# Patient Record
Sex: MALE | Race: WHITE | NOT HISPANIC OR LATINO | Employment: OTHER | URBAN - METROPOLITAN AREA
[De-identification: names, ages, dates, MRNs, and addresses within clinical notes are randomized per-mention and may not be internally consistent; named-entity substitution may affect disease eponyms.]

---

## 2017-01-10 ENCOUNTER — ALLSCRIPTS OFFICE VISIT (OUTPATIENT)
Dept: OTHER | Facility: OTHER | Age: 82
End: 2017-01-10

## 2017-01-17 ENCOUNTER — APPOINTMENT (INPATIENT)
Dept: CT IMAGING | Facility: HOSPITAL | Age: 82
DRG: 871 | End: 2017-01-17
Payer: MEDICARE

## 2017-01-17 ENCOUNTER — APPOINTMENT (EMERGENCY)
Dept: RADIOLOGY | Facility: HOSPITAL | Age: 82
DRG: 871 | End: 2017-01-17
Payer: MEDICARE

## 2017-01-17 ENCOUNTER — APPOINTMENT (INPATIENT)
Dept: RADIOLOGY | Facility: HOSPITAL | Age: 82
DRG: 871 | End: 2017-01-17
Payer: MEDICARE

## 2017-01-17 ENCOUNTER — APPOINTMENT (EMERGENCY)
Dept: CT IMAGING | Facility: HOSPITAL | Age: 82
DRG: 871 | End: 2017-01-17
Payer: MEDICARE

## 2017-01-17 ENCOUNTER — HOSPITAL ENCOUNTER (INPATIENT)
Facility: HOSPITAL | Age: 82
LOS: 17 days | Discharge: NURSING, REHAB OR LONG-TERM CARE | DRG: 871 | End: 2017-02-03
Attending: EMERGENCY MEDICINE | Admitting: INTERNAL MEDICINE
Payer: MEDICARE

## 2017-01-17 DIAGNOSIS — R41.89 COGNITIVE DECLINE: ICD-10-CM

## 2017-01-17 DIAGNOSIS — R65.20 SEVERE SEPSIS (HCC): Primary | ICD-10-CM

## 2017-01-17 DIAGNOSIS — I34.0 MITRAL REGURGITATION: ICD-10-CM

## 2017-01-17 DIAGNOSIS — N39.0 URINARY TRACT INFECTION WITHOUT HEMATURIA, SITE UNSPECIFIED: ICD-10-CM

## 2017-01-17 DIAGNOSIS — F03.91 DEMENTIA WITH BEHAVIORAL DISTURBANCE (HCC): ICD-10-CM

## 2017-01-17 DIAGNOSIS — R77.8 ELEVATED TROPONIN: ICD-10-CM

## 2017-01-17 DIAGNOSIS — R33.9 URINARY RETENTION: ICD-10-CM

## 2017-01-17 DIAGNOSIS — A41.9 SEVERE SEPSIS (HCC): Primary | ICD-10-CM

## 2017-01-17 DIAGNOSIS — I48.91 RAPID ATRIAL FIBRILLATION (HCC): ICD-10-CM
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
## 2017-01-17 PROBLEM — R74.01 TRANSAMINITIS: Status: ACTIVE | Noted: 2017-01-17

## 2017-01-17 PROBLEM — N17.9 AKI (ACUTE KIDNEY INJURY) (HCC): Status: ACTIVE | Noted: 2017-01-17

## 2017-01-17 PROBLEM — M62.82 RHABDOMYOLYSIS: Status: ACTIVE | Noted: 2017-01-17

## 2017-01-17 LAB
ALBUMIN SERPL BCP-MCNC: 3.4 G/DL (ref 3.5–5)
ALP SERPL-CCNC: 69 U/L (ref 46–116)
ALT SERPL W P-5'-P-CCNC: 142 U/L (ref 12–78)
AMMONIA PLAS-SCNC: <10 UMOL/L (ref 11–35)
AMPHETAMINES SERPL QL SCN: NEGATIVE
ANION GAP SERPL CALCULATED.3IONS-SCNC: 9 MMOL/L (ref 4–13)
APTT PPP: 28 SECONDS (ref 24–36)
ARTERIAL PATENCY WRIST A: ABNORMAL
AST SERPL W P-5'-P-CCNC: 274 U/L (ref 5–45)
ATRIAL RATE: 90 BPM
BACTERIA UR QL AUTO: ABNORMAL /HPF
BARBITURATES UR QL: NEGATIVE
BASE EXCESS BLDA CALC-SCNC: 1 MMOL/L (ref -2–3)
BASOPHILS # BLD AUTO: 0.01 THOUSANDS/ΜL (ref 0–0.1)
BASOPHILS NFR BLD AUTO: 0 % (ref 0–1)
BENZODIAZ UR QL: NEGATIVE
BILIRUB DIRECT SERPL-MCNC: 0.39 MG/DL (ref 0–0.2)
BILIRUB SERPL-MCNC: 1.3 MG/DL (ref 0.2–1)
BILIRUB UR QL STRIP: NEGATIVE
BUN SERPL-MCNC: 62 MG/DL (ref 5–25)
CA-I BLD-SCNC: 1.11 MMOL/L (ref 1.12–1.32)
CALCIUM SERPL-MCNC: 8.8 MG/DL (ref 8.3–10.1)
CHLORIDE SERPL-SCNC: 101 MMOL/L (ref 100–108)
CK MB SERPL-MCNC: 16.2 NG/ML (ref 0–5)
CK MB SERPL-MCNC: <1 % (ref 0–2.5)
CK SERPL-CCNC: 6738 U/L (ref 39–308)
CLARITY UR: ABNORMAL
CO2 SERPL-SCNC: 31 MMOL/L (ref 21–32)
COCAINE UR QL: NEGATIVE
COLOR UR: YELLOW
CREAT SERPL-MCNC: 1.37 MG/DL (ref 0.6–1.3)
EOSINOPHIL # BLD AUTO: 0.02 THOUSAND/ΜL (ref 0–0.61)
EOSINOPHIL NFR BLD AUTO: 0 % (ref 0–6)
ERYTHROCYTE [DISTWIDTH] IN BLOOD BY AUTOMATED COUNT: 13.3 % (ref 11.6–15.1)
FIO2 GAS DIL.REBREATH: 21 L
FOLATE SERPL-MCNC: 19 NG/ML (ref 3.1–17.5)
GFR SERPL CREATININE-BSD FRML MDRD: 49.7 ML/MIN/1.73SQ M
GLUCOSE SERPL-MCNC: 119 MG/DL (ref 65–140)
GLUCOSE SERPL-MCNC: 123 MG/DL (ref 65–140)
GLUCOSE UR STRIP-MCNC: NEGATIVE MG/DL
HCO3 BLDA-SCNC: 24.9 MMOL/L (ref 22–28)
HCT VFR BLD AUTO: 38.1 % (ref 36.5–49.3)
HCT VFR BLD CALC: 30 % (ref 36.5–49.3)
HGB BLD-MCNC: 13.3 G/DL (ref 12–17)
HGB BLDA-MCNC: 10.2 G/DL (ref 12–17)
HGB UR QL STRIP.AUTO: ABNORMAL
INR PPP: 1.27 (ref 0.86–1.16)
KETONES UR STRIP-MCNC: NEGATIVE MG/DL
LACTATE SERPL-SCNC: 1.9 MMOL/L (ref 0.5–2)
LACTATE SERPL-SCNC: 2.4 MMOL/L (ref 0.5–2)
LEUKOCYTE ESTERASE UR QL STRIP: ABNORMAL
LIPASE SERPL-CCNC: 161 U/L (ref 73–393)
LYMPHOCYTES # BLD AUTO: 0.75 THOUSANDS/ΜL (ref 0.6–4.47)
LYMPHOCYTES NFR BLD AUTO: 5 % (ref 14–44)
MCH RBC QN AUTO: 33.3 PG (ref 26.8–34.3)
MCHC RBC AUTO-ENTMCNC: 34.9 G/DL (ref 31.4–37.4)
MCV RBC AUTO: 95 FL (ref 82–98)
METHADONE UR QL: NEGATIVE
MONOCYTES # BLD AUTO: 1.14 THOUSAND/ΜL (ref 0.17–1.22)
MONOCYTES NFR BLD AUTO: 7 % (ref 4–12)
NEUTROPHILS # BLD AUTO: 13.75 THOUSANDS/ΜL (ref 1.85–7.62)
NEUTS SEG NFR BLD AUTO: 88 % (ref 43–75)
NITRITE UR QL STRIP: NEGATIVE
NON-SQ EPI CELLS URNS QL MICRO: ABNORMAL /HPF
OPIATES UR QL SCN: NEGATIVE
P AXIS: 92 DEGREES
PCO2 BLD: 26 MMOL/L (ref 21–32)
PCO2 BLD: 36.8 MM HG (ref 36–44)
PCP UR QL: NEGATIVE
PH BLD: 7.44 [PH] (ref 7.35–7.45)
PH UR STRIP.AUTO: 6 [PH] (ref 4.5–8)
PLATELET # BLD AUTO: 200 THOUSANDS/UL (ref 149–390)
PLATELET # BLD AUTO: 227 THOUSANDS/UL (ref 149–390)
PMV BLD AUTO: 9.7 FL (ref 8.9–12.7)
PMV BLD AUTO: 9.8 FL (ref 8.9–12.7)
PO2 BLD: 80 MM HG (ref 75–129)
POTASSIUM BLD-SCNC: 3.6 MMOL/L (ref 3.5–5.3)
POTASSIUM SERPL-SCNC: 3.9 MMOL/L (ref 3.5–5.3)
PR INTERVAL: 136 MS
PROT SERPL-MCNC: 7.4 G/DL (ref 6.4–8.2)
PROT UR STRIP-MCNC: ABNORMAL MG/DL
PROTHROMBIN TIME: 15.6 SECONDS (ref 12–14.3)
QRS AXIS: 72 DEGREES
QRSD INTERVAL: 92 MS
QT INTERVAL: 418 MS
QTC INTERVAL: 511 MS
RBC # BLD AUTO: 4 MILLION/UL (ref 3.88–5.62)
RBC #/AREA URNS AUTO: ABNORMAL /HPF
SAMPLE SITE: ABNORMAL
SAO2 % BLD FROM PO2: 96 % (ref 95–98)
SODIUM BLD-SCNC: 141 MMOL/L (ref 136–145)
SODIUM SERPL-SCNC: 141 MMOL/L (ref 136–145)
SP GR UR STRIP.AUTO: 1.02 (ref 1–1.03)
SPECIMEN SOURCE: ABNORMAL
T WAVE AXIS: 58 DEGREES
THC UR QL: NEGATIVE
TROPONIN I SERPL-MCNC: 1.25 NG/ML
TROPONIN I SERPL-MCNC: 1.56 NG/ML
TROPONIN I SERPL-MCNC: 1.64 NG/ML
TSH SERPL DL<=0.05 MIU/L-ACNC: 1.17 UIU/ML (ref 0.36–3.74)
UROBILINOGEN UR QL STRIP.AUTO: 0.2 E.U./DL
VENTRICULAR RATE: 90 BPM
VIT B12 SERPL-MCNC: 569 PG/ML (ref 100–900)
WBC # BLD AUTO: 15.67 THOUSAND/UL (ref 4.31–10.16)
WBC #/AREA URNS AUTO: ABNORMAL /HPF

## 2017-01-17 PROCEDURE — 84443 ASSAY THYROID STIM HORMONE: CPT | Performed by: PHYSICIAN ASSISTANT

## 2017-01-17 PROCEDURE — 84484 ASSAY OF TROPONIN QUANT: CPT | Performed by: EMERGENCY MEDICINE

## 2017-01-17 PROCEDURE — 87186 SC STD MICRODIL/AGAR DIL: CPT | Performed by: EMERGENCY MEDICINE

## 2017-01-17 PROCEDURE — 93005 ELECTROCARDIOGRAM TRACING: CPT

## 2017-01-17 PROCEDURE — 84484 ASSAY OF TROPONIN QUANT: CPT | Performed by: PHYSICIAN ASSISTANT

## 2017-01-17 PROCEDURE — 84132 ASSAY OF SERUM POTASSIUM: CPT

## 2017-01-17 PROCEDURE — 83605 ASSAY OF LACTIC ACID: CPT | Performed by: EMERGENCY MEDICINE

## 2017-01-17 PROCEDURE — 83690 ASSAY OF LIPASE: CPT | Performed by: EMERGENCY MEDICINE

## 2017-01-17 PROCEDURE — 85610 PROTHROMBIN TIME: CPT | Performed by: EMERGENCY MEDICINE

## 2017-01-17 PROCEDURE — 80076 HEPATIC FUNCTION PANEL: CPT | Performed by: EMERGENCY MEDICINE

## 2017-01-17 PROCEDURE — 70450 CT HEAD/BRAIN W/O DYE: CPT

## 2017-01-17 PROCEDURE — 74176 CT ABD & PELVIS W/O CONTRAST: CPT

## 2017-01-17 PROCEDURE — 85049 AUTOMATED PLATELET COUNT: CPT | Performed by: PHYSICIAN ASSISTANT

## 2017-01-17 PROCEDURE — 82140 ASSAY OF AMMONIA: CPT | Performed by: EMERGENCY MEDICINE

## 2017-01-17 PROCEDURE — 80048 BASIC METABOLIC PNL TOTAL CA: CPT | Performed by: EMERGENCY MEDICINE

## 2017-01-17 PROCEDURE — 82550 ASSAY OF CK (CPK): CPT | Performed by: PHYSICIAN ASSISTANT

## 2017-01-17 PROCEDURE — 72128 CT CHEST SPINE W/O DYE: CPT

## 2017-01-17 PROCEDURE — 85730 THROMBOPLASTIN TIME PARTIAL: CPT | Performed by: EMERGENCY MEDICINE

## 2017-01-17 PROCEDURE — 36600 WITHDRAWAL OF ARTERIAL BLOOD: CPT

## 2017-01-17 PROCEDURE — 96361 HYDRATE IV INFUSION ADD-ON: CPT

## 2017-01-17 PROCEDURE — 82947 ASSAY GLUCOSE BLOOD QUANT: CPT

## 2017-01-17 PROCEDURE — 82746 ASSAY OF FOLIC ACID SERUM: CPT | Performed by: PHYSICIAN ASSISTANT

## 2017-01-17 PROCEDURE — 73590 X-RAY EXAM OF LOWER LEG: CPT

## 2017-01-17 PROCEDURE — 80307 DRUG TEST PRSMV CHEM ANLYZR: CPT | Performed by: EMERGENCY MEDICINE

## 2017-01-17 PROCEDURE — 87040 BLOOD CULTURE FOR BACTERIA: CPT | Performed by: EMERGENCY MEDICINE

## 2017-01-17 PROCEDURE — 87077 CULTURE AEROBIC IDENTIFY: CPT | Performed by: EMERGENCY MEDICINE

## 2017-01-17 PROCEDURE — 82330 ASSAY OF CALCIUM: CPT

## 2017-01-17 PROCEDURE — 84295 ASSAY OF SERUM SODIUM: CPT

## 2017-01-17 PROCEDURE — 85014 HEMATOCRIT: CPT

## 2017-01-17 PROCEDURE — 85025 COMPLETE CBC W/AUTO DIFF WBC: CPT | Performed by: EMERGENCY MEDICINE

## 2017-01-17 PROCEDURE — 36415 COLL VENOUS BLD VENIPUNCTURE: CPT | Performed by: EMERGENCY MEDICINE

## 2017-01-17 PROCEDURE — 82553 CREATINE MB FRACTION: CPT | Performed by: PHYSICIAN ASSISTANT

## 2017-01-17 PROCEDURE — 84484 ASSAY OF TROPONIN QUANT: CPT | Performed by: INTERNAL MEDICINE

## 2017-01-17 PROCEDURE — 71020 HB CHEST X-RAY 2VW FRONTAL&LATL: CPT

## 2017-01-17 PROCEDURE — 83605 ASSAY OF LACTIC ACID: CPT | Performed by: PHYSICIAN ASSISTANT

## 2017-01-17 PROCEDURE — 81001 URINALYSIS AUTO W/SCOPE: CPT | Performed by: EMERGENCY MEDICINE

## 2017-01-17 PROCEDURE — 96365 THER/PROPH/DIAG IV INF INIT: CPT

## 2017-01-17 PROCEDURE — 87086 URINE CULTURE/COLONY COUNT: CPT | Performed by: EMERGENCY MEDICINE

## 2017-01-17 PROCEDURE — 93005 ELECTROCARDIOGRAM TRACING: CPT | Performed by: EMERGENCY MEDICINE

## 2017-01-17 PROCEDURE — 82607 VITAMIN B-12: CPT | Performed by: PHYSICIAN ASSISTANT

## 2017-01-17 PROCEDURE — 82803 BLOOD GASES ANY COMBINATION: CPT

## 2017-01-17 PROCEDURE — 99285 EMERGENCY DEPT VISIT HI MDM: CPT

## 2017-01-17 RX ORDER — SODIUM CHLORIDE 9 MG/ML
100 INJECTION, SOLUTION INTRAVENOUS CONTINUOUS
Status: DISCONTINUED | OUTPATIENT
Start: 2017-01-17 | End: 2017-01-17

## 2017-01-17 RX ORDER — ISOSORBIDE DINITRATE 10 MG/1
30 TABLET ORAL 4 TIMES DAILY
Status: DISCONTINUED | OUTPATIENT
Start: 2017-01-17 | End: 2017-02-03 | Stop reason: HOSPADM

## 2017-01-17 RX ORDER — LOSARTAN POTASSIUM AND HYDROCHLOROTHIAZIDE 12.5; 1 MG/1; MG/1
1 TABLET ORAL DAILY
COMMUNITY
End: 2017-02-03 | Stop reason: HOSPADM

## 2017-01-17 RX ORDER — METOPROLOL SUCCINATE 50 MG/1
50 TABLET, EXTENDED RELEASE ORAL DAILY
Status: DISCONTINUED | OUTPATIENT
Start: 2017-01-17 | End: 2017-01-23

## 2017-01-17 RX ORDER — SODIUM CHLORIDE 9 MG/ML
125 INJECTION, SOLUTION INTRAVENOUS CONTINUOUS
Status: DISCONTINUED | OUTPATIENT
Start: 2017-01-17 | End: 2017-01-19

## 2017-01-17 RX ORDER — LANOLIN ALCOHOL/MO/W.PET/CERES
3 CREAM (GRAM) TOPICAL ONCE
Status: DISCONTINUED | OUTPATIENT
Start: 2017-01-17 | End: 2017-02-03 | Stop reason: HOSPADM

## 2017-01-17 RX ORDER — CLOPIDOGREL BISULFATE 75 MG/1
75 TABLET ORAL EVERY OTHER DAY
Status: DISCONTINUED | OUTPATIENT
Start: 2017-01-17 | End: 2017-02-03 | Stop reason: HOSPADM

## 2017-01-17 RX ORDER — LANOLIN ALCOHOL/MO/W.PET/CERES
3 CREAM (GRAM) TOPICAL
Status: DISCONTINUED | OUTPATIENT
Start: 2017-01-17 | End: 2017-01-23

## 2017-01-17 RX ORDER — ACETAMINOPHEN 325 MG/1
650 TABLET ORAL EVERY 6 HOURS PRN
COMMUNITY

## 2017-01-17 RX ORDER — ESCITALOPRAM OXALATE 10 MG/1
10 TABLET ORAL DAILY
Status: DISCONTINUED | OUTPATIENT
Start: 2017-01-18 | End: 2017-02-03 | Stop reason: HOSPADM

## 2017-01-17 RX ORDER — NITROFURANTOIN MACROCRYSTALS 100 MG/1
100 CAPSULE ORAL 4 TIMES DAILY
COMMUNITY
End: 2017-02-03 | Stop reason: HOSPADM

## 2017-01-17 RX ORDER — TRAMADOL HYDROCHLORIDE 50 MG/1
50 TABLET ORAL
COMMUNITY
End: 2017-02-03 | Stop reason: HOSPADM

## 2017-01-17 RX ORDER — ONDANSETRON 2 MG/ML
4 INJECTION INTRAMUSCULAR; INTRAVENOUS EVERY 4 HOURS PRN
Status: DISCONTINUED | OUTPATIENT
Start: 2017-01-17 | End: 2017-02-03 | Stop reason: HOSPADM

## 2017-01-17 RX ORDER — PANTOPRAZOLE SODIUM 20 MG/1
20 TABLET, DELAYED RELEASE ORAL DAILY PRN
Status: DISCONTINUED | OUTPATIENT
Start: 2017-01-17 | End: 2017-02-03 | Stop reason: HOSPADM

## 2017-01-17 RX ORDER — TIZANIDINE HYDROCHLORIDE 4 MG/1
4 CAPSULE, GELATIN COATED ORAL 3 TIMES DAILY PRN
COMMUNITY
End: 2017-02-03 | Stop reason: HOSPADM

## 2017-01-17 RX ORDER — ROSUVASTATIN CALCIUM 10 MG/1
10 TABLET, COATED ORAL DAILY
COMMUNITY
End: 2018-05-23 | Stop reason: HOSPADM

## 2017-01-17 RX ORDER — NITROGLYCERIN 0.4 MG/1
0.4 TABLET SUBLINGUAL
Status: DISCONTINUED | OUTPATIENT
Start: 2017-01-17 | End: 2017-01-20

## 2017-01-17 RX ORDER — CLONAZEPAM 0.5 MG/1
0.5 TABLET ORAL
COMMUNITY
End: 2017-02-03 | Stop reason: HOSPADM

## 2017-01-17 RX ADMIN — SODIUM CHLORIDE 100 ML/HR: 0.9 INJECTION, SOLUTION INTRAVENOUS at 19:49

## 2017-01-17 RX ADMIN — SODIUM CHLORIDE 100 ML/HR: 0.9 INJECTION, SOLUTION INTRAVENOUS at 19:47

## 2017-01-17 RX ADMIN — SODIUM CHLORIDE 1000 ML: 0.9 INJECTION, SOLUTION INTRAVENOUS at 11:47

## 2017-01-17 RX ADMIN — CLOPIDOGREL 75 MG: 75 TABLET, FILM COATED ORAL at 19:48

## 2017-01-17 RX ADMIN — CEFTRIAXONE 1000 MG: 1 INJECTION, POWDER, FOR SOLUTION INTRAMUSCULAR; INTRAVENOUS at 12:58

## 2017-01-17 RX ADMIN — ISOSORBIDE DINITRATE 30 MG: 10 TABLET ORAL at 21:03

## 2017-01-17 RX ADMIN — MELATONIN TAB 3 MG 3 MG: 3 TAB at 21:03

## 2017-01-17 RX ADMIN — ISOSORBIDE DINITRATE 30 MG: 10 TABLET ORAL at 19:48

## 2017-01-17 RX ADMIN — METOPROLOL SUCCINATE 50 MG: 50 TABLET, FILM COATED, EXTENDED RELEASE ORAL at 15:46

## 2017-01-17 RX ADMIN — SODIUM CHLORIDE 1000 ML: 0.9 INJECTION, SOLUTION INTRAVENOUS at 13:13

## 2017-01-18 ENCOUNTER — APPOINTMENT (INPATIENT)
Dept: NON INVASIVE DIAGNOSTICS | Facility: HOSPITAL | Age: 82
DRG: 871 | End: 2017-01-18
Payer: MEDICARE

## 2017-01-18 ENCOUNTER — GENERIC CONVERSION - ENCOUNTER (OUTPATIENT)
Dept: OTHER | Facility: OTHER | Age: 82
End: 2017-01-18

## 2017-01-18 LAB
ALBUMIN SERPL BCP-MCNC: 2.8 G/DL (ref 3.5–5)
ALP SERPL-CCNC: 51 U/L (ref 46–116)
ALT SERPL W P-5'-P-CCNC: 119 U/L (ref 12–78)
ANION GAP SERPL CALCULATED.3IONS-SCNC: 8 MMOL/L (ref 4–13)
AST SERPL W P-5'-P-CCNC: 187 U/L (ref 5–45)
BASOPHILS # BLD AUTO: 0 THOUSANDS/ΜL (ref 0–0.1)
BASOPHILS NFR BLD AUTO: 0 % (ref 0–1)
BILIRUB DIRECT SERPL-MCNC: 0.25 MG/DL (ref 0–0.2)
BILIRUB SERPL-MCNC: 0.9 MG/DL (ref 0.2–1)
BUN SERPL-MCNC: 42 MG/DL (ref 5–25)
CALCIUM SERPL-MCNC: 7.6 MG/DL (ref 8.3–10.1)
CHLORIDE SERPL-SCNC: 108 MMOL/L (ref 100–108)
CK MB SERPL-MCNC: 7.7 NG/ML (ref 0–5)
CK MB SERPL-MCNC: <1 % (ref 0–2.5)
CK SERPL-CCNC: 5260 U/L (ref 39–308)
CO2 SERPL-SCNC: 26 MMOL/L (ref 21–32)
CREAT SERPL-MCNC: 1.05 MG/DL (ref 0.6–1.3)
EOSINOPHIL # BLD AUTO: 0.04 THOUSAND/ΜL (ref 0–0.61)
EOSINOPHIL NFR BLD AUTO: 0 % (ref 0–6)
ERYTHROCYTE [DISTWIDTH] IN BLOOD BY AUTOMATED COUNT: 13.2 % (ref 11.6–15.1)
GFR SERPL CREATININE-BSD FRML MDRD: >60 ML/MIN/1.73SQ M
GLUCOSE SERPL-MCNC: 108 MG/DL (ref 65–140)
HCT VFR BLD AUTO: 31.3 % (ref 36.5–49.3)
HGB BLD-MCNC: 10.7 G/DL (ref 12–17)
LYMPHOCYTES # BLD AUTO: 0.7 THOUSANDS/ΜL (ref 0.6–4.47)
LYMPHOCYTES NFR BLD AUTO: 6 % (ref 14–44)
MCH RBC QN AUTO: 33 PG (ref 26.8–34.3)
MCHC RBC AUTO-ENTMCNC: 34.2 G/DL (ref 31.4–37.4)
MCV RBC AUTO: 97 FL (ref 82–98)
MONOCYTES # BLD AUTO: 0.88 THOUSAND/ΜL (ref 0.17–1.22)
MONOCYTES NFR BLD AUTO: 8 % (ref 4–12)
NEUTROPHILS # BLD AUTO: 10.02 THOUSANDS/ΜL (ref 1.85–7.62)
NEUTS SEG NFR BLD AUTO: 86 % (ref 43–75)
PLATELET # BLD AUTO: 200 THOUSANDS/UL (ref 149–390)
PMV BLD AUTO: 10.1 FL (ref 8.9–12.7)
POTASSIUM SERPL-SCNC: 3.6 MMOL/L (ref 3.5–5.3)
PROT SERPL-MCNC: 6 G/DL (ref 6.4–8.2)
RBC # BLD AUTO: 3.24 MILLION/UL (ref 3.88–5.62)
SODIUM SERPL-SCNC: 142 MMOL/L (ref 136–145)
TROPONIN I SERPL-MCNC: 1.02 NG/ML
WBC # BLD AUTO: 11.64 THOUSAND/UL (ref 4.31–10.16)

## 2017-01-18 PROCEDURE — 97167 OT EVAL HIGH COMPLEX 60 MIN: CPT

## 2017-01-18 PROCEDURE — 82550 ASSAY OF CK (CPK): CPT | Performed by: PHYSICIAN ASSISTANT

## 2017-01-18 PROCEDURE — G8987 SELF CARE CURRENT STATUS: HCPCS

## 2017-01-18 PROCEDURE — 80048 BASIC METABOLIC PNL TOTAL CA: CPT | Performed by: PHYSICIAN ASSISTANT

## 2017-01-18 PROCEDURE — 93306 TTE W/DOPPLER COMPLETE: CPT

## 2017-01-18 PROCEDURE — 85025 COMPLETE CBC W/AUTO DIFF WBC: CPT | Performed by: PHYSICIAN ASSISTANT

## 2017-01-18 PROCEDURE — 84484 ASSAY OF TROPONIN QUANT: CPT | Performed by: INTERNAL MEDICINE

## 2017-01-18 PROCEDURE — 82553 CREATINE MB FRACTION: CPT | Performed by: PHYSICIAN ASSISTANT

## 2017-01-18 PROCEDURE — G8988 SELF CARE GOAL STATUS: HCPCS

## 2017-01-18 PROCEDURE — 80076 HEPATIC FUNCTION PANEL: CPT | Performed by: PHYSICIAN ASSISTANT

## 2017-01-18 RX ORDER — TAMSULOSIN HYDROCHLORIDE 0.4 MG/1
0.4 CAPSULE ORAL
Status: DISCONTINUED | OUTPATIENT
Start: 2017-01-18 | End: 2017-02-03 | Stop reason: HOSPADM

## 2017-01-18 RX ORDER — QUETIAPINE FUMARATE 25 MG/1
12.5 TABLET, FILM COATED ORAL 2 TIMES DAILY
Status: DISCONTINUED | OUTPATIENT
Start: 2017-01-18 | End: 2017-01-30

## 2017-01-18 RX ORDER — LIDOCAINE 50 MG/G
1 PATCH TOPICAL DAILY
Status: DISCONTINUED | OUTPATIENT
Start: 2017-01-18 | End: 2017-02-03 | Stop reason: HOSPADM

## 2017-01-18 RX ORDER — ACETAMINOPHEN 325 MG/1
650 TABLET ORAL EVERY 6 HOURS PRN
Status: DISCONTINUED | OUTPATIENT
Start: 2017-01-18 | End: 2017-02-03 | Stop reason: HOSPADM

## 2017-01-18 RX ADMIN — ISOSORBIDE DINITRATE 30 MG: 10 TABLET ORAL at 11:32

## 2017-01-18 RX ADMIN — SODIUM CHLORIDE 125 ML/HR: 0.9 INJECTION, SOLUTION INTRAVENOUS at 11:29

## 2017-01-18 RX ADMIN — ISOSORBIDE DINITRATE 30 MG: 10 TABLET ORAL at 18:30

## 2017-01-18 RX ADMIN — SODIUM CHLORIDE 125 ML/HR: 0.9 INJECTION, SOLUTION INTRAVENOUS at 03:51

## 2017-01-18 RX ADMIN — ISOSORBIDE DINITRATE 30 MG: 10 TABLET ORAL at 21:30

## 2017-01-18 RX ADMIN — MELATONIN TAB 3 MG 3 MG: 3 TAB at 21:31

## 2017-01-18 RX ADMIN — QUETIAPINE FUMARATE 12.5 MG: 25 TABLET, FILM COATED ORAL at 15:54

## 2017-01-18 RX ADMIN — ESCITALOPRAM OXALATE 10 MG: 10 TABLET, FILM COATED ORAL at 08:12

## 2017-01-18 RX ADMIN — METOPROLOL SUCCINATE 50 MG: 50 TABLET, FILM COATED, EXTENDED RELEASE ORAL at 08:11

## 2017-01-18 RX ADMIN — LIDOCAINE 1 PATCH: 50 PATCH CUTANEOUS at 10:35

## 2017-01-18 RX ADMIN — TAMSULOSIN HYDROCHLORIDE 0.4 MG: 0.4 CAPSULE ORAL at 18:29

## 2017-01-18 RX ADMIN — CEFTRIAXONE 1000 MG: 1 INJECTION, POWDER, FOR SOLUTION INTRAMUSCULAR; INTRAVENOUS at 12:40

## 2017-01-19 ENCOUNTER — GENERIC CONVERSION - ENCOUNTER (OUTPATIENT)
Dept: OTHER | Facility: OTHER | Age: 82
End: 2017-01-19

## 2017-01-19 ENCOUNTER — APPOINTMENT (INPATIENT)
Dept: RADIOLOGY | Facility: HOSPITAL | Age: 82
DRG: 871 | End: 2017-01-19
Payer: MEDICARE

## 2017-01-19 PROBLEM — F03.91 DEMENTIA WITH BEHAVIORAL DISTURBANCE (HCC): Status: ACTIVE | Noted: 2017-01-19

## 2017-01-19 PROBLEM — N17.9 AKI (ACUTE KIDNEY INJURY) (HCC): Status: RESOLVED | Noted: 2017-01-17 | Resolved: 2017-01-19

## 2017-01-19 PROBLEM — M62.82 RHABDOMYOLYSIS: Status: RESOLVED | Noted: 2017-01-17 | Resolved: 2017-01-19

## 2017-01-19 LAB
ALBUMIN SERPL BCP-MCNC: 2.8 G/DL (ref 3.5–5)
ALP SERPL-CCNC: 51 U/L (ref 46–116)
ALT SERPL W P-5'-P-CCNC: 109 U/L (ref 12–78)
ANION GAP SERPL CALCULATED.3IONS-SCNC: 9 MMOL/L (ref 4–13)
AST SERPL W P-5'-P-CCNC: 142 U/L (ref 5–45)
BACTERIA UR CULT: NORMAL
BASOPHILS # BLD AUTO: 0.02 THOUSANDS/ΜL (ref 0–0.1)
BASOPHILS NFR BLD AUTO: 0 % (ref 0–1)
BILIRUB SERPL-MCNC: 0.7 MG/DL (ref 0.2–1)
BUN SERPL-MCNC: 31 MG/DL (ref 5–25)
CALCIUM SERPL-MCNC: 7.7 MG/DL (ref 8.3–10.1)
CHLORIDE SERPL-SCNC: 110 MMOL/L (ref 100–108)
CK MB SERPL-MCNC: 3.7 NG/ML (ref 0–5)
CK MB SERPL-MCNC: <1 % (ref 0–2.5)
CK SERPL-CCNC: 3151 U/L (ref 39–308)
CO2 SERPL-SCNC: 24 MMOL/L (ref 21–32)
CREAT SERPL-MCNC: 0.98 MG/DL (ref 0.6–1.3)
EOSINOPHIL # BLD AUTO: 0.19 THOUSAND/ΜL (ref 0–0.61)
EOSINOPHIL NFR BLD AUTO: 2 % (ref 0–6)
ERYTHROCYTE [DISTWIDTH] IN BLOOD BY AUTOMATED COUNT: 13.4 % (ref 11.6–15.1)
GFR SERPL CREATININE-BSD FRML MDRD: >60 ML/MIN/1.73SQ M
GLUCOSE SERPL-MCNC: 104 MG/DL (ref 65–140)
HCT VFR BLD AUTO: 30.3 % (ref 36.5–49.3)
HGB BLD-MCNC: 10.1 G/DL (ref 12–17)
LYMPHOCYTES # BLD AUTO: 0.96 THOUSANDS/ΜL (ref 0.6–4.47)
LYMPHOCYTES NFR BLD AUTO: 11 % (ref 14–44)
MCH RBC QN AUTO: 32.8 PG (ref 26.8–34.3)
MCHC RBC AUTO-ENTMCNC: 33.3 G/DL (ref 31.4–37.4)
MCV RBC AUTO: 98 FL (ref 82–98)
MONOCYTES # BLD AUTO: 0.65 THOUSAND/ΜL (ref 0.17–1.22)
MONOCYTES NFR BLD AUTO: 7 % (ref 4–12)
NEUTROPHILS # BLD AUTO: 7.35 THOUSANDS/ΜL (ref 1.85–7.62)
NEUTS SEG NFR BLD AUTO: 80 % (ref 43–75)
PLATELET # BLD AUTO: 191 THOUSANDS/UL (ref 149–390)
PMV BLD AUTO: 10.4 FL (ref 8.9–12.7)
POTASSIUM SERPL-SCNC: 4 MMOL/L (ref 3.5–5.3)
PROT SERPL-MCNC: 6.3 G/DL (ref 6.4–8.2)
RBC # BLD AUTO: 3.08 MILLION/UL (ref 3.88–5.62)
SODIUM SERPL-SCNC: 143 MMOL/L (ref 136–145)
TROPONIN I SERPL-MCNC: 0.23 NG/ML
TROPONIN I SERPL-MCNC: 0.38 NG/ML
TROPONIN I SERPL-MCNC: 0.39 NG/ML
WBC # BLD AUTO: 9.17 THOUSAND/UL (ref 4.31–10.16)

## 2017-01-19 PROCEDURE — 97163 PT EVAL HIGH COMPLEX 45 MIN: CPT

## 2017-01-19 PROCEDURE — G8978 MOBILITY CURRENT STATUS: HCPCS

## 2017-01-19 PROCEDURE — 82550 ASSAY OF CK (CPK): CPT | Performed by: PHYSICIAN ASSISTANT

## 2017-01-19 PROCEDURE — 80053 COMPREHEN METABOLIC PANEL: CPT | Performed by: PHYSICIAN ASSISTANT

## 2017-01-19 PROCEDURE — 84484 ASSAY OF TROPONIN QUANT: CPT | Performed by: PHYSICIAN ASSISTANT

## 2017-01-19 PROCEDURE — 85025 COMPLETE CBC W/AUTO DIFF WBC: CPT | Performed by: PHYSICIAN ASSISTANT

## 2017-01-19 PROCEDURE — G8979 MOBILITY GOAL STATUS: HCPCS

## 2017-01-19 PROCEDURE — 97116 GAIT TRAINING THERAPY: CPT

## 2017-01-19 PROCEDURE — 71010 HB CHEST X-RAY 1 VIEW FRONTAL (PORTABLE): CPT

## 2017-01-19 PROCEDURE — 82553 CREATINE MB FRACTION: CPT | Performed by: PHYSICIAN ASSISTANT

## 2017-01-19 RX ORDER — SODIUM CHLORIDE 9 MG/ML
50 INJECTION, SOLUTION INTRAVENOUS ONCE
Status: DISCONTINUED | OUTPATIENT
Start: 2017-01-19 | End: 2017-02-03 | Stop reason: HOSPADM

## 2017-01-19 RX ADMIN — PANTOPRAZOLE SODIUM 20 MG: 20 TABLET, DELAYED RELEASE ORAL at 12:02

## 2017-01-19 RX ADMIN — MELATONIN TAB 3 MG 3 MG: 3 TAB at 21:25

## 2017-01-19 RX ADMIN — QUETIAPINE FUMARATE 12.5 MG: 25 TABLET, FILM COATED ORAL at 08:31

## 2017-01-19 RX ADMIN — ESCITALOPRAM OXALATE 10 MG: 10 TABLET, FILM COATED ORAL at 08:30

## 2017-01-19 RX ADMIN — TAMSULOSIN HYDROCHLORIDE 0.4 MG: 0.4 CAPSULE ORAL at 15:34

## 2017-01-19 RX ADMIN — LIDOCAINE 1 PATCH: 50 PATCH CUTANEOUS at 08:33

## 2017-01-19 RX ADMIN — ISOSORBIDE DINITRATE 30 MG: 10 TABLET ORAL at 08:31

## 2017-01-19 RX ADMIN — CEFTRIAXONE 1000 MG: 1 INJECTION, POWDER, FOR SOLUTION INTRAMUSCULAR; INTRAVENOUS at 12:02

## 2017-01-19 RX ADMIN — QUETIAPINE FUMARATE 12.5 MG: 25 TABLET, FILM COATED ORAL at 18:33

## 2017-01-19 RX ADMIN — METOPROLOL SUCCINATE 50 MG: 50 TABLET, FILM COATED, EXTENDED RELEASE ORAL at 08:30

## 2017-01-19 RX ADMIN — CLOPIDOGREL 75 MG: 75 TABLET, FILM COATED ORAL at 08:30

## 2017-01-19 RX ADMIN — ISOSORBIDE DINITRATE 30 MG: 10 TABLET ORAL at 18:32

## 2017-01-19 RX ADMIN — ISOSORBIDE DINITRATE 30 MG: 10 TABLET ORAL at 21:24

## 2017-01-19 RX ADMIN — ACETAMINOPHEN 650 MG: 325 TABLET ORAL at 15:34

## 2017-01-19 RX ADMIN — ISOSORBIDE DINITRATE 30 MG: 10 TABLET ORAL at 12:02

## 2017-01-20 LAB
ANION GAP SERPL CALCULATED.3IONS-SCNC: 7 MMOL/L (ref 4–13)
ATRIAL RATE: 96 BPM
BUN SERPL-MCNC: 24 MG/DL (ref 5–25)
CALCIUM SERPL-MCNC: 7.8 MG/DL (ref 8.3–10.1)
CHLORIDE SERPL-SCNC: 110 MMOL/L (ref 100–108)
CK MB SERPL-MCNC: 2.6 NG/ML (ref 0–5)
CK MB SERPL-MCNC: <1 % (ref 0–2.5)
CK SERPL-CCNC: 1459 U/L (ref 39–308)
CO2 SERPL-SCNC: 27 MMOL/L (ref 21–32)
CREAT SERPL-MCNC: 0.97 MG/DL (ref 0.6–1.3)
GFR SERPL CREATININE-BSD FRML MDRD: >60 ML/MIN/1.73SQ M
GLUCOSE SERPL-MCNC: 107 MG/DL (ref 65–140)
POTASSIUM SERPL-SCNC: 3.5 MMOL/L (ref 3.5–5.3)
QRS AXIS: 66 DEGREES
QRSD INTERVAL: 88 MS
QT INTERVAL: 400 MS
QTC INTERVAL: 505 MS
SODIUM SERPL-SCNC: 144 MMOL/L (ref 136–145)
T WAVE AXIS: -48 DEGREES
VENTRICULAR RATE: 96 BPM

## 2017-01-20 PROCEDURE — 82553 CREATINE MB FRACTION: CPT | Performed by: PHYSICIAN ASSISTANT

## 2017-01-20 PROCEDURE — 80048 BASIC METABOLIC PNL TOTAL CA: CPT | Performed by: PHYSICIAN ASSISTANT

## 2017-01-20 PROCEDURE — 82550 ASSAY OF CK (CPK): CPT | Performed by: PHYSICIAN ASSISTANT

## 2017-01-20 RX ORDER — POTASSIUM CHLORIDE 20 MEQ/1
20 TABLET, EXTENDED RELEASE ORAL ONCE
Status: COMPLETED | OUTPATIENT
Start: 2017-01-20 | End: 2017-01-20

## 2017-01-20 RX ORDER — GINSENG 100 MG
1 CAPSULE ORAL 2 TIMES DAILY
Status: DISCONTINUED | OUTPATIENT
Start: 2017-01-20 | End: 2017-01-31

## 2017-01-20 RX ORDER — DOCUSATE SODIUM 100 MG/1
100 CAPSULE, LIQUID FILLED ORAL 2 TIMES DAILY
Status: DISCONTINUED | OUTPATIENT
Start: 2017-01-20 | End: 2017-02-03 | Stop reason: HOSPADM

## 2017-01-20 RX ADMIN — BACITRACIN ZINC 1 SMALL APPLICATION: 500 OINTMENT TOPICAL at 11:08

## 2017-01-20 RX ADMIN — CEFTRIAXONE 1000 MG: 1 INJECTION, POWDER, FOR SOLUTION INTRAMUSCULAR; INTRAVENOUS at 13:21

## 2017-01-20 RX ADMIN — QUETIAPINE FUMARATE 12.5 MG: 25 TABLET, FILM COATED ORAL at 08:18

## 2017-01-20 RX ADMIN — ISOSORBIDE DINITRATE 30 MG: 10 TABLET ORAL at 17:18

## 2017-01-20 RX ADMIN — POTASSIUM CHLORIDE 20 MEQ: 1500 TABLET, EXTENDED RELEASE ORAL at 11:08

## 2017-01-20 RX ADMIN — ENOXAPARIN SODIUM 40 MG: 40 INJECTION SUBCUTANEOUS at 08:18

## 2017-01-20 RX ADMIN — ACETAMINOPHEN 650 MG: 325 TABLET ORAL at 11:12

## 2017-01-20 RX ADMIN — BACITRACIN ZINC 1 SMALL APPLICATION: 500 OINTMENT TOPICAL at 17:18

## 2017-01-20 RX ADMIN — MELATONIN TAB 3 MG 3 MG: 3 TAB at 21:35

## 2017-01-20 RX ADMIN — ESCITALOPRAM OXALATE 10 MG: 10 TABLET, FILM COATED ORAL at 08:19

## 2017-01-20 RX ADMIN — ISOSORBIDE DINITRATE 30 MG: 10 TABLET ORAL at 21:35

## 2017-01-20 RX ADMIN — QUETIAPINE FUMARATE 12.5 MG: 25 TABLET, FILM COATED ORAL at 17:18

## 2017-01-20 RX ADMIN — ISOSORBIDE DINITRATE 30 MG: 10 TABLET ORAL at 08:18

## 2017-01-20 RX ADMIN — ISOSORBIDE DINITRATE 30 MG: 10 TABLET ORAL at 11:10

## 2017-01-20 RX ADMIN — DOCUSATE SODIUM 100 MG: 100 CAPSULE, LIQUID FILLED ORAL at 17:18

## 2017-01-20 RX ADMIN — DOCUSATE SODIUM 100 MG: 100 CAPSULE, LIQUID FILLED ORAL at 11:08

## 2017-01-20 RX ADMIN — CLOPIDOGREL 75 MG: 75 TABLET, FILM COATED ORAL at 08:18

## 2017-01-20 RX ADMIN — LIDOCAINE 1 PATCH: 50 PATCH CUTANEOUS at 08:17

## 2017-01-20 RX ADMIN — TAMSULOSIN HYDROCHLORIDE 0.4 MG: 0.4 CAPSULE ORAL at 17:18

## 2017-01-20 RX ADMIN — METOPROLOL SUCCINATE 50 MG: 50 TABLET, FILM COATED, EXTENDED RELEASE ORAL at 08:19

## 2017-01-21 RX ADMIN — TAMSULOSIN HYDROCHLORIDE 0.4 MG: 0.4 CAPSULE ORAL at 17:16

## 2017-01-21 RX ADMIN — QUETIAPINE FUMARATE 12.5 MG: 25 TABLET, FILM COATED ORAL at 17:16

## 2017-01-21 RX ADMIN — CLOPIDOGREL 75 MG: 75 TABLET, FILM COATED ORAL at 09:21

## 2017-01-21 RX ADMIN — ISOSORBIDE DINITRATE 30 MG: 10 TABLET ORAL at 17:16

## 2017-01-21 RX ADMIN — LIDOCAINE 1 PATCH: 50 PATCH CUTANEOUS at 09:21

## 2017-01-21 RX ADMIN — MELATONIN TAB 3 MG 3 MG: 3 TAB at 21:09

## 2017-01-21 RX ADMIN — DOCUSATE SODIUM 100 MG: 100 CAPSULE, LIQUID FILLED ORAL at 17:16

## 2017-01-21 RX ADMIN — ISOSORBIDE DINITRATE 30 MG: 10 TABLET ORAL at 12:31

## 2017-01-21 RX ADMIN — ESCITALOPRAM OXALATE 10 MG: 10 TABLET, FILM COATED ORAL at 09:20

## 2017-01-21 RX ADMIN — BACITRACIN ZINC 1 SMALL APPLICATION: 500 OINTMENT TOPICAL at 09:20

## 2017-01-21 RX ADMIN — QUETIAPINE FUMARATE 12.5 MG: 25 TABLET, FILM COATED ORAL at 09:21

## 2017-01-21 RX ADMIN — BACITRACIN ZINC 1 SMALL APPLICATION: 500 OINTMENT TOPICAL at 17:31

## 2017-01-21 RX ADMIN — ISOSORBIDE DINITRATE 30 MG: 10 TABLET ORAL at 21:09

## 2017-01-21 RX ADMIN — CEFTRIAXONE 1000 MG: 1 INJECTION, POWDER, FOR SOLUTION INTRAMUSCULAR; INTRAVENOUS at 12:29

## 2017-01-21 RX ADMIN — ISOSORBIDE DINITRATE 30 MG: 10 TABLET ORAL at 09:21

## 2017-01-21 RX ADMIN — DOCUSATE SODIUM 100 MG: 100 CAPSULE, LIQUID FILLED ORAL at 09:21

## 2017-01-21 RX ADMIN — METOPROLOL SUCCINATE 50 MG: 50 TABLET, FILM COATED, EXTENDED RELEASE ORAL at 09:20

## 2017-01-22 PROBLEM — I47.1 PAT (PAROXYSMAL ATRIAL TACHYCARDIA) (HCC): Chronic | Status: ACTIVE | Noted: 2017-01-22
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
## 2017-01-22 PROBLEM — A41.9 SEPSIS (HCC): Status: RESOLVED | Noted: 2017-01-17 | Resolved: 2017-01-22

## 2017-01-22 PROBLEM — I25.10 CORONARY ARTERY DISEASE INVOLVING NATIVE CORONARY ARTERY OF NATIVE HEART: Status: ACTIVE | Noted: 2017-01-22

## 2017-01-22 PROBLEM — R77.8 ELEVATED TROPONIN: Status: RESOLVED | Noted: 2017-01-17 | Resolved: 2017-01-22

## 2017-01-22 LAB
ANION GAP SERPL CALCULATED.3IONS-SCNC: 9 MMOL/L (ref 4–13)
BACTERIA BLD CULT: NORMAL
BACTERIA BLD CULT: NORMAL
BASOPHILS # BLD AUTO: 0.03 THOUSANDS/ΜL (ref 0–0.1)
BASOPHILS NFR BLD AUTO: 0 % (ref 0–1)
BUN SERPL-MCNC: 23 MG/DL (ref 5–25)
CALCIUM SERPL-MCNC: 8.1 MG/DL (ref 8.3–10.1)
CHLORIDE SERPL-SCNC: 106 MMOL/L (ref 100–108)
CO2 SERPL-SCNC: 28 MMOL/L (ref 21–32)
CREAT SERPL-MCNC: 1 MG/DL (ref 0.6–1.3)
EOSINOPHIL # BLD AUTO: 0.3 THOUSAND/ΜL (ref 0–0.61)
EOSINOPHIL NFR BLD AUTO: 3 % (ref 0–6)
ERYTHROCYTE [DISTWIDTH] IN BLOOD BY AUTOMATED COUNT: 13.4 % (ref 11.6–15.1)
GFR SERPL CREATININE-BSD FRML MDRD: >60 ML/MIN/1.73SQ M
GLUCOSE SERPL-MCNC: 107 MG/DL (ref 65–140)
HCT VFR BLD AUTO: 31.4 % (ref 36.5–49.3)
HGB BLD-MCNC: 10.7 G/DL (ref 12–17)
LYMPHOCYTES # BLD AUTO: 1.32 THOUSANDS/ΜL (ref 0.6–4.47)
LYMPHOCYTES NFR BLD AUTO: 15 % (ref 14–44)
MCH RBC QN AUTO: 33.1 PG (ref 26.8–34.3)
MCHC RBC AUTO-ENTMCNC: 34.1 G/DL (ref 31.4–37.4)
MCV RBC AUTO: 97 FL (ref 82–98)
MONOCYTES # BLD AUTO: 0.6 THOUSAND/ΜL (ref 0.17–1.22)
MONOCYTES NFR BLD AUTO: 7 % (ref 4–12)
NEUTROPHILS # BLD AUTO: 6.79 THOUSANDS/ΜL (ref 1.85–7.62)
NEUTS SEG NFR BLD AUTO: 75 % (ref 43–75)
PLATELET # BLD AUTO: 254 THOUSANDS/UL (ref 149–390)
PMV BLD AUTO: 9.4 FL (ref 8.9–12.7)
POTASSIUM SERPL-SCNC: 3.7 MMOL/L (ref 3.5–5.3)
RBC # BLD AUTO: 3.23 MILLION/UL (ref 3.88–5.62)
SODIUM SERPL-SCNC: 143 MMOL/L (ref 136–145)
WBC # BLD AUTO: 9.04 THOUSAND/UL (ref 4.31–10.16)

## 2017-01-22 PROCEDURE — 80048 BASIC METABOLIC PNL TOTAL CA: CPT | Performed by: PHYSICIAN ASSISTANT

## 2017-01-22 PROCEDURE — 85025 COMPLETE CBC W/AUTO DIFF WBC: CPT | Performed by: PHYSICIAN ASSISTANT

## 2017-01-22 RX ADMIN — ISOSORBIDE DINITRATE 30 MG: 10 TABLET ORAL at 21:15

## 2017-01-22 RX ADMIN — QUETIAPINE FUMARATE 12.5 MG: 25 TABLET, FILM COATED ORAL at 17:44

## 2017-01-22 RX ADMIN — ISOSORBIDE DINITRATE 30 MG: 10 TABLET ORAL at 17:44

## 2017-01-22 RX ADMIN — ISOSORBIDE DINITRATE 30 MG: 10 TABLET ORAL at 13:52

## 2017-01-22 RX ADMIN — BACITRACIN ZINC 1 SMALL APPLICATION: 500 OINTMENT TOPICAL at 09:08

## 2017-01-22 RX ADMIN — CEFTRIAXONE 1000 MG: 1 INJECTION, POWDER, FOR SOLUTION INTRAMUSCULAR; INTRAVENOUS at 13:54

## 2017-01-22 RX ADMIN — METOPROLOL SUCCINATE 50 MG: 50 TABLET, FILM COATED, EXTENDED RELEASE ORAL at 09:14

## 2017-01-22 RX ADMIN — MELATONIN TAB 3 MG 3 MG: 3 TAB at 21:16

## 2017-01-22 RX ADMIN — ISOSORBIDE DINITRATE 30 MG: 10 TABLET ORAL at 09:11

## 2017-01-22 RX ADMIN — TAMSULOSIN HYDROCHLORIDE 0.4 MG: 0.4 CAPSULE ORAL at 17:44

## 2017-01-22 RX ADMIN — LIDOCAINE 1 PATCH: 50 PATCH CUTANEOUS at 09:10

## 2017-01-22 RX ADMIN — QUETIAPINE FUMARATE 12.5 MG: 25 TABLET, FILM COATED ORAL at 09:11

## 2017-01-22 RX ADMIN — ESCITALOPRAM OXALATE 10 MG: 10 TABLET, FILM COATED ORAL at 09:11

## 2017-01-23 PROCEDURE — 97116 GAIT TRAINING THERAPY: CPT

## 2017-01-23 PROCEDURE — 97110 THERAPEUTIC EXERCISES: CPT

## 2017-01-23 RX ORDER — IBUPROFEN 400 MG/1
400 TABLET ORAL EVERY 8 HOURS PRN
Status: DISCONTINUED | OUTPATIENT
Start: 2017-01-23 | End: 2017-02-03 | Stop reason: HOSPADM

## 2017-01-23 RX ORDER — ALPRAZOLAM 0.25 MG/1
0.25 TABLET ORAL
Status: DISCONTINUED | OUTPATIENT
Start: 2017-01-23 | End: 2017-02-03 | Stop reason: HOSPADM

## 2017-01-23 RX ORDER — LANOLIN ALCOHOL/MO/W.PET/CERES
6 CREAM (GRAM) TOPICAL
Status: DISCONTINUED | OUTPATIENT
Start: 2017-01-23 | End: 2017-02-03 | Stop reason: HOSPADM

## 2017-01-23 RX ADMIN — METOPROLOL SUCCINATE 50 MG: 50 TABLET, FILM COATED, EXTENDED RELEASE ORAL at 09:02

## 2017-01-23 RX ADMIN — QUETIAPINE FUMARATE 12.5 MG: 25 TABLET, FILM COATED ORAL at 17:00

## 2017-01-23 RX ADMIN — QUETIAPINE FUMARATE 12.5 MG: 25 TABLET, FILM COATED ORAL at 09:02

## 2017-01-23 RX ADMIN — DOCUSATE SODIUM 100 MG: 100 CAPSULE, LIQUID FILLED ORAL at 17:00

## 2017-01-23 RX ADMIN — BACITRACIN ZINC 1 SMALL APPLICATION: 500 OINTMENT TOPICAL at 09:02

## 2017-01-23 RX ADMIN — LIDOCAINE 1 PATCH: 50 PATCH CUTANEOUS at 09:02

## 2017-01-23 RX ADMIN — CLOPIDOGREL 75 MG: 75 TABLET, FILM COATED ORAL at 09:02

## 2017-01-23 RX ADMIN — ESCITALOPRAM OXALATE 10 MG: 10 TABLET, FILM COATED ORAL at 09:02

## 2017-01-23 RX ADMIN — ALPRAZOLAM 0.25 MG: 0.25 TABLET ORAL at 01:24

## 2017-01-23 RX ADMIN — ISOSORBIDE DINITRATE 30 MG: 10 TABLET ORAL at 12:12

## 2017-01-23 RX ADMIN — CEFTRIAXONE 1000 MG: 1 INJECTION, POWDER, FOR SOLUTION INTRAMUSCULAR; INTRAVENOUS at 12:12

## 2017-01-23 RX ADMIN — ISOSORBIDE DINITRATE 30 MG: 10 TABLET ORAL at 09:02

## 2017-01-23 RX ADMIN — ISOSORBIDE DINITRATE 30 MG: 10 TABLET ORAL at 21:07

## 2017-01-23 RX ADMIN — ISOSORBIDE DINITRATE 30 MG: 10 TABLET ORAL at 17:00

## 2017-01-23 RX ADMIN — ALPRAZOLAM 0.25 MG: 0.25 TABLET ORAL at 21:08

## 2017-01-23 RX ADMIN — TAMSULOSIN HYDROCHLORIDE 0.4 MG: 0.4 CAPSULE ORAL at 16:58

## 2017-01-23 RX ADMIN — MELATONIN TAB 3 MG 6 MG: 3 TAB at 21:08

## 2017-01-23 RX ADMIN — BACITRACIN ZINC 1 SMALL APPLICATION: 500 OINTMENT TOPICAL at 17:01

## 2017-01-24 PROBLEM — I10 HYPERTENSION: Status: ACTIVE | Noted: 2017-01-24

## 2017-01-24 LAB
ALBUMIN SERPL BCP-MCNC: 2.9 G/DL (ref 3.5–5)
ALP SERPL-CCNC: 56 U/L (ref 46–116)
ALT SERPL W P-5'-P-CCNC: 80 U/L (ref 12–78)
AST SERPL W P-5'-P-CCNC: 40 U/L (ref 5–45)
BILIRUB DIRECT SERPL-MCNC: 0.14 MG/DL (ref 0–0.2)
BILIRUB SERPL-MCNC: 0.5 MG/DL (ref 0.2–1)
CK SERPL-CCNC: 155 U/L (ref 39–308)
PROT SERPL-MCNC: 6.6 G/DL (ref 6.4–8.2)

## 2017-01-24 PROCEDURE — 80076 HEPATIC FUNCTION PANEL: CPT | Performed by: PHYSICIAN ASSISTANT

## 2017-01-24 PROCEDURE — 82550 ASSAY OF CK (CPK): CPT | Performed by: PHYSICIAN ASSISTANT

## 2017-01-24 RX ORDER — PRAVASTATIN SODIUM 80 MG/1
80 TABLET ORAL
Status: DISCONTINUED | OUTPATIENT
Start: 2017-01-24 | End: 2017-02-03 | Stop reason: HOSPADM

## 2017-01-24 RX ORDER — CALCIUM CARBONATE 200(500)MG
500 TABLET,CHEWABLE ORAL DAILY PRN
Status: DISCONTINUED | OUTPATIENT
Start: 2017-01-24 | End: 2017-02-03 | Stop reason: HOSPADM

## 2017-01-24 RX ADMIN — ISOSORBIDE DINITRATE 30 MG: 10 TABLET ORAL at 21:15

## 2017-01-24 RX ADMIN — DOCUSATE SODIUM 100 MG: 100 CAPSULE, LIQUID FILLED ORAL at 18:10

## 2017-01-24 RX ADMIN — PANTOPRAZOLE SODIUM 20 MG: 20 TABLET, DELAYED RELEASE ORAL at 15:47

## 2017-01-24 RX ADMIN — CEFTRIAXONE 1000 MG: 1 INJECTION, POWDER, FOR SOLUTION INTRAMUSCULAR; INTRAVENOUS at 12:35

## 2017-01-24 RX ADMIN — LIDOCAINE 1 PATCH: 50 PATCH CUTANEOUS at 09:53

## 2017-01-24 RX ADMIN — MELATONIN TAB 3 MG 6 MG: 3 TAB at 21:15

## 2017-01-24 RX ADMIN — ISOSORBIDE DINITRATE 30 MG: 10 TABLET ORAL at 18:10

## 2017-01-24 RX ADMIN — QUETIAPINE FUMARATE 12.5 MG: 25 TABLET, FILM COATED ORAL at 09:52

## 2017-01-24 RX ADMIN — BACITRACIN ZINC 1 SMALL APPLICATION: 500 OINTMENT TOPICAL at 18:10

## 2017-01-24 RX ADMIN — ISOSORBIDE DINITRATE 30 MG: 10 TABLET ORAL at 12:35

## 2017-01-24 RX ADMIN — ISOSORBIDE DINITRATE 30 MG: 10 TABLET ORAL at 09:51

## 2017-01-24 RX ADMIN — TAMSULOSIN HYDROCHLORIDE 0.4 MG: 0.4 CAPSULE ORAL at 15:47

## 2017-01-24 RX ADMIN — QUETIAPINE FUMARATE 12.5 MG: 25 TABLET, FILM COATED ORAL at 18:10

## 2017-01-24 RX ADMIN — ANTACID TABLETS 500 MG: 500 TABLET, CHEWABLE ORAL at 18:10

## 2017-01-24 RX ADMIN — METOPROLOL SUCCINATE 75 MG: 50 TABLET, FILM COATED, EXTENDED RELEASE ORAL at 09:51

## 2017-01-24 RX ADMIN — ENOXAPARIN SODIUM 40 MG: 40 INJECTION SUBCUTANEOUS at 09:51

## 2017-01-24 RX ADMIN — PRAVASTATIN SODIUM 80 MG: 80 TABLET ORAL at 15:47

## 2017-01-24 RX ADMIN — DOCUSATE SODIUM 100 MG: 100 CAPSULE, LIQUID FILLED ORAL at 09:51

## 2017-01-24 RX ADMIN — BACITRACIN ZINC 1 SMALL APPLICATION: 500 OINTMENT TOPICAL at 09:51

## 2017-01-24 RX ADMIN — ESCITALOPRAM OXALATE 10 MG: 10 TABLET, FILM COATED ORAL at 09:51

## 2017-01-25 PROBLEM — R74.01 TRANSAMINITIS: Status: RESOLVED | Noted: 2017-01-17 | Resolved: 2017-01-25

## 2017-01-25 PROCEDURE — 97116 GAIT TRAINING THERAPY: CPT

## 2017-01-25 PROCEDURE — 97535 SELF CARE MNGMENT TRAINING: CPT

## 2017-01-25 PROCEDURE — 97532 HB COGNITIVE SKILLS DEVELOPMENT: CPT

## 2017-01-25 RX ADMIN — LIDOCAINE 1 PATCH: 50 PATCH CUTANEOUS at 08:47

## 2017-01-25 RX ADMIN — ISOSORBIDE DINITRATE 30 MG: 10 TABLET ORAL at 14:19

## 2017-01-25 RX ADMIN — BACITRACIN ZINC 1 SMALL APPLICATION: 500 OINTMENT TOPICAL at 17:10

## 2017-01-25 RX ADMIN — ISOSORBIDE DINITRATE 30 MG: 10 TABLET ORAL at 17:10

## 2017-01-25 RX ADMIN — DOCUSATE SODIUM 100 MG: 100 CAPSULE, LIQUID FILLED ORAL at 08:47

## 2017-01-25 RX ADMIN — ALPRAZOLAM 0.25 MG: 0.25 TABLET ORAL at 02:03

## 2017-01-25 RX ADMIN — CLOPIDOGREL 75 MG: 75 TABLET, FILM COATED ORAL at 08:46

## 2017-01-25 RX ADMIN — QUETIAPINE FUMARATE 12.5 MG: 25 TABLET, FILM COATED ORAL at 08:46

## 2017-01-25 RX ADMIN — CEFTRIAXONE 1000 MG: 1 INJECTION, POWDER, FOR SOLUTION INTRAMUSCULAR; INTRAVENOUS at 14:30

## 2017-01-25 RX ADMIN — ENOXAPARIN SODIUM 40 MG: 40 INJECTION SUBCUTANEOUS at 08:45

## 2017-01-25 RX ADMIN — ALPRAZOLAM 0.25 MG: 0.25 TABLET ORAL at 23:33

## 2017-01-25 RX ADMIN — BACITRACIN ZINC 1 SMALL APPLICATION: 500 OINTMENT TOPICAL at 08:46

## 2017-01-25 RX ADMIN — PRAVASTATIN SODIUM 80 MG: 80 TABLET ORAL at 17:08

## 2017-01-25 RX ADMIN — ESCITALOPRAM OXALATE 10 MG: 10 TABLET, FILM COATED ORAL at 08:46

## 2017-01-25 RX ADMIN — MELATONIN TAB 3 MG 6 MG: 3 TAB at 21:15

## 2017-01-25 RX ADMIN — QUETIAPINE FUMARATE 12.5 MG: 25 TABLET, FILM COATED ORAL at 17:08

## 2017-01-25 RX ADMIN — ISOSORBIDE DINITRATE 30 MG: 10 TABLET ORAL at 21:15

## 2017-01-25 RX ADMIN — TAMSULOSIN HYDROCHLORIDE 0.4 MG: 0.4 CAPSULE ORAL at 17:09

## 2017-01-25 RX ADMIN — METOPROLOL SUCCINATE 75 MG: 50 TABLET, FILM COATED, EXTENDED RELEASE ORAL at 08:49

## 2017-01-25 RX ADMIN — DOCUSATE SODIUM 100 MG: 100 CAPSULE, LIQUID FILLED ORAL at 17:09

## 2017-01-25 RX ADMIN — ISOSORBIDE DINITRATE 30 MG: 10 TABLET ORAL at 08:49

## 2017-01-26 RX ADMIN — DOCUSATE SODIUM 100 MG: 100 CAPSULE, LIQUID FILLED ORAL at 17:41

## 2017-01-26 RX ADMIN — MELATONIN TAB 3 MG 6 MG: 3 TAB at 21:21

## 2017-01-26 RX ADMIN — METOPROLOL SUCCINATE 75 MG: 50 TABLET, FILM COATED, EXTENDED RELEASE ORAL at 09:43

## 2017-01-26 RX ADMIN — TAMSULOSIN HYDROCHLORIDE 0.4 MG: 0.4 CAPSULE ORAL at 16:19

## 2017-01-26 RX ADMIN — ENOXAPARIN SODIUM 40 MG: 40 INJECTION SUBCUTANEOUS at 09:42

## 2017-01-26 RX ADMIN — ESCITALOPRAM OXALATE 10 MG: 10 TABLET, FILM COATED ORAL at 09:44

## 2017-01-26 RX ADMIN — ISOSORBIDE DINITRATE 30 MG: 10 TABLET ORAL at 17:42

## 2017-01-26 RX ADMIN — PRAVASTATIN SODIUM 80 MG: 80 TABLET ORAL at 16:19

## 2017-01-26 RX ADMIN — ISOSORBIDE DINITRATE 30 MG: 10 TABLET ORAL at 12:23

## 2017-01-26 RX ADMIN — BACITRACIN ZINC 1 SMALL APPLICATION: 500 OINTMENT TOPICAL at 17:43

## 2017-01-26 RX ADMIN — ISOSORBIDE DINITRATE 30 MG: 10 TABLET ORAL at 09:44

## 2017-01-26 RX ADMIN — ISOSORBIDE DINITRATE 30 MG: 10 TABLET ORAL at 21:20

## 2017-01-26 RX ADMIN — DOCUSATE SODIUM 100 MG: 100 CAPSULE, LIQUID FILLED ORAL at 09:43

## 2017-01-26 RX ADMIN — QUETIAPINE FUMARATE 12.5 MG: 25 TABLET, FILM COATED ORAL at 09:43

## 2017-01-26 RX ADMIN — QUETIAPINE FUMARATE 12.5 MG: 25 TABLET, FILM COATED ORAL at 17:41

## 2017-01-26 RX ADMIN — CEFTRIAXONE 1000 MG: 1 INJECTION, POWDER, FOR SOLUTION INTRAMUSCULAR; INTRAVENOUS at 12:23

## 2017-01-27 RX ADMIN — DOCUSATE SODIUM 100 MG: 100 CAPSULE, LIQUID FILLED ORAL at 10:19

## 2017-01-27 RX ADMIN — QUETIAPINE FUMARATE 12.5 MG: 25 TABLET, FILM COATED ORAL at 10:19

## 2017-01-27 RX ADMIN — QUETIAPINE FUMARATE 12.5 MG: 25 TABLET, FILM COATED ORAL at 18:10

## 2017-01-27 RX ADMIN — CLOPIDOGREL 75 MG: 75 TABLET, FILM COATED ORAL at 10:21

## 2017-01-27 RX ADMIN — DOCUSATE SODIUM 100 MG: 100 CAPSULE, LIQUID FILLED ORAL at 18:10

## 2017-01-27 RX ADMIN — ISOSORBIDE DINITRATE 30 MG: 10 TABLET ORAL at 14:07

## 2017-01-27 RX ADMIN — MELATONIN TAB 3 MG 6 MG: 3 TAB at 21:58

## 2017-01-27 RX ADMIN — METOPROLOL SUCCINATE 75 MG: 50 TABLET, FILM COATED, EXTENDED RELEASE ORAL at 10:21

## 2017-01-27 RX ADMIN — ISOSORBIDE DINITRATE 30 MG: 10 TABLET ORAL at 10:19

## 2017-01-27 RX ADMIN — ISOSORBIDE DINITRATE 30 MG: 10 TABLET ORAL at 18:10

## 2017-01-27 RX ADMIN — TAMSULOSIN HYDROCHLORIDE 0.4 MG: 0.4 CAPSULE ORAL at 16:15

## 2017-01-27 RX ADMIN — ISOSORBIDE DINITRATE 30 MG: 10 TABLET ORAL at 22:00

## 2017-01-27 RX ADMIN — ENOXAPARIN SODIUM 40 MG: 40 INJECTION SUBCUTANEOUS at 10:23

## 2017-01-27 RX ADMIN — BACITRACIN ZINC 1 SMALL APPLICATION: 500 OINTMENT TOPICAL at 10:22

## 2017-01-27 RX ADMIN — PRAVASTATIN SODIUM 80 MG: 80 TABLET ORAL at 16:15

## 2017-01-27 RX ADMIN — ESCITALOPRAM OXALATE 10 MG: 10 TABLET, FILM COATED ORAL at 10:22

## 2017-01-27 RX ADMIN — LIDOCAINE 1 PATCH: 50 PATCH CUTANEOUS at 10:22

## 2017-01-28 RX ORDER — HYDRALAZINE HYDROCHLORIDE 20 MG/ML
5 INJECTION INTRAMUSCULAR; INTRAVENOUS EVERY 6 HOURS PRN
Status: DISCONTINUED | OUTPATIENT
Start: 2017-01-28 | End: 2017-01-28

## 2017-01-28 RX ADMIN — BACITRACIN ZINC 1 SMALL APPLICATION: 500 OINTMENT TOPICAL at 08:08

## 2017-01-28 RX ADMIN — ESCITALOPRAM OXALATE 10 MG: 10 TABLET, FILM COATED ORAL at 08:09

## 2017-01-28 RX ADMIN — BACITRACIN ZINC 1 SMALL APPLICATION: 500 OINTMENT TOPICAL at 17:29

## 2017-01-28 RX ADMIN — ISOSORBIDE DINITRATE 30 MG: 10 TABLET ORAL at 14:22

## 2017-01-28 RX ADMIN — ISOSORBIDE DINITRATE 30 MG: 10 TABLET ORAL at 08:16

## 2017-01-28 RX ADMIN — PRAVASTATIN SODIUM 80 MG: 80 TABLET ORAL at 17:29

## 2017-01-28 RX ADMIN — CLOPIDOGREL 75 MG: 75 TABLET, FILM COATED ORAL at 08:08

## 2017-01-28 RX ADMIN — MELATONIN TAB 3 MG 6 MG: 3 TAB at 21:29

## 2017-01-28 RX ADMIN — QUETIAPINE FUMARATE 12.5 MG: 25 TABLET, FILM COATED ORAL at 17:29

## 2017-01-28 RX ADMIN — ISOSORBIDE DINITRATE 30 MG: 10 TABLET ORAL at 17:29

## 2017-01-28 RX ADMIN — METOPROLOL SUCCINATE 75 MG: 50 TABLET, FILM COATED, EXTENDED RELEASE ORAL at 08:08

## 2017-01-28 RX ADMIN — ISOSORBIDE DINITRATE 30 MG: 10 TABLET ORAL at 21:29

## 2017-01-28 RX ADMIN — ALPRAZOLAM 0.25 MG: 0.25 TABLET ORAL at 21:29

## 2017-01-28 RX ADMIN — QUETIAPINE FUMARATE 12.5 MG: 25 TABLET, FILM COATED ORAL at 08:08

## 2017-01-28 RX ADMIN — TAMSULOSIN HYDROCHLORIDE 0.4 MG: 0.4 CAPSULE ORAL at 17:28

## 2017-01-28 RX ADMIN — DOCUSATE SODIUM 100 MG: 100 CAPSULE, LIQUID FILLED ORAL at 17:28

## 2017-01-29 LAB
ANION GAP SERPL CALCULATED.3IONS-SCNC: 8 MMOL/L (ref 4–13)
BASOPHILS # BLD AUTO: 0.05 THOUSANDS/ΜL (ref 0–0.1)
BASOPHILS NFR BLD AUTO: 1 % (ref 0–1)
BUN SERPL-MCNC: 22 MG/DL (ref 5–25)
CALCIUM SERPL-MCNC: 8.2 MG/DL (ref 8.3–10.1)
CHLORIDE SERPL-SCNC: 105 MMOL/L (ref 100–108)
CO2 SERPL-SCNC: 27 MMOL/L (ref 21–32)
CREAT SERPL-MCNC: 1.09 MG/DL (ref 0.6–1.3)
EOSINOPHIL # BLD AUTO: 0.2 THOUSAND/ΜL (ref 0–0.61)
EOSINOPHIL NFR BLD AUTO: 3 % (ref 0–6)
ERYTHROCYTE [DISTWIDTH] IN BLOOD BY AUTOMATED COUNT: 14 % (ref 11.6–15.1)
GFR SERPL CREATININE-BSD FRML MDRD: >60 ML/MIN/1.73SQ M
GLUCOSE SERPL-MCNC: 101 MG/DL (ref 65–140)
HCT VFR BLD AUTO: 32.2 % (ref 36.5–49.3)
HGB BLD-MCNC: 10.7 G/DL (ref 12–17)
LYMPHOCYTES # BLD AUTO: 1.4 THOUSANDS/ΜL (ref 0.6–4.47)
LYMPHOCYTES NFR BLD AUTO: 18 % (ref 14–44)
MCH RBC QN AUTO: 33 PG (ref 26.8–34.3)
MCHC RBC AUTO-ENTMCNC: 33.2 G/DL (ref 31.4–37.4)
MCV RBC AUTO: 99 FL (ref 82–98)
MONOCYTES # BLD AUTO: 0.51 THOUSAND/ΜL (ref 0.17–1.22)
MONOCYTES NFR BLD AUTO: 7 % (ref 4–12)
NEUTROPHILS # BLD AUTO: 5.63 THOUSANDS/ΜL (ref 1.85–7.62)
NEUTS SEG NFR BLD AUTO: 71 % (ref 43–75)
PLATELET # BLD AUTO: 291 THOUSANDS/UL (ref 149–390)
PMV BLD AUTO: 9.8 FL (ref 8.9–12.7)
POTASSIUM SERPL-SCNC: 3.8 MMOL/L (ref 3.5–5.3)
RBC # BLD AUTO: 3.24 MILLION/UL (ref 3.88–5.62)
SODIUM SERPL-SCNC: 140 MMOL/L (ref 136–145)
WBC # BLD AUTO: 7.79 THOUSAND/UL (ref 4.31–10.16)

## 2017-01-29 PROCEDURE — 80048 BASIC METABOLIC PNL TOTAL CA: CPT | Performed by: PHYSICIAN ASSISTANT

## 2017-01-29 PROCEDURE — 85025 COMPLETE CBC W/AUTO DIFF WBC: CPT | Performed by: PHYSICIAN ASSISTANT

## 2017-01-29 RX ORDER — LOSARTAN POTASSIUM 50 MG/1
100 TABLET ORAL DAILY
Status: DISCONTINUED | OUTPATIENT
Start: 2017-01-29 | End: 2017-02-03 | Stop reason: HOSPADM

## 2017-01-29 RX ADMIN — ISOSORBIDE DINITRATE 30 MG: 10 TABLET ORAL at 08:36

## 2017-01-29 RX ADMIN — CLOPIDOGREL 75 MG: 75 TABLET, FILM COATED ORAL at 08:36

## 2017-01-29 RX ADMIN — ISOSORBIDE DINITRATE 30 MG: 10 TABLET ORAL at 21:32

## 2017-01-29 RX ADMIN — METOPROLOL SUCCINATE 75 MG: 50 TABLET, FILM COATED, EXTENDED RELEASE ORAL at 08:36

## 2017-01-29 RX ADMIN — ALPRAZOLAM 0.25 MG: 0.25 TABLET ORAL at 21:32

## 2017-01-29 RX ADMIN — DOCUSATE SODIUM 100 MG: 100 CAPSULE, LIQUID FILLED ORAL at 17:05

## 2017-01-29 RX ADMIN — PRAVASTATIN SODIUM 80 MG: 80 TABLET ORAL at 17:05

## 2017-01-29 RX ADMIN — ISOSORBIDE DINITRATE 30 MG: 10 TABLET ORAL at 11:13

## 2017-01-29 RX ADMIN — QUETIAPINE FUMARATE 12.5 MG: 25 TABLET, FILM COATED ORAL at 17:03

## 2017-01-29 RX ADMIN — DOCUSATE SODIUM 100 MG: 100 CAPSULE, LIQUID FILLED ORAL at 08:36

## 2017-01-29 RX ADMIN — TAMSULOSIN HYDROCHLORIDE 0.4 MG: 0.4 CAPSULE ORAL at 17:03

## 2017-01-29 RX ADMIN — ISOSORBIDE DINITRATE 30 MG: 10 TABLET ORAL at 17:05

## 2017-01-29 RX ADMIN — BACITRACIN ZINC 1 SMALL APPLICATION: 500 OINTMENT TOPICAL at 08:35

## 2017-01-29 RX ADMIN — QUETIAPINE FUMARATE 12.5 MG: 25 TABLET, FILM COATED ORAL at 08:36

## 2017-01-29 RX ADMIN — MELATONIN TAB 3 MG 6 MG: 3 TAB at 21:32

## 2017-01-29 RX ADMIN — ESCITALOPRAM OXALATE 10 MG: 10 TABLET, FILM COATED ORAL at 08:36

## 2017-01-29 RX ADMIN — LOSARTAN POTASSIUM 100 MG: 50 TABLET, FILM COATED ORAL at 14:43

## 2017-01-30 PROBLEM — N39.0 UTI (URINARY TRACT INFECTION): Status: RESOLVED | Noted: 2017-01-17 | Resolved: 2017-01-30

## 2017-01-30 PROCEDURE — 97116 GAIT TRAINING THERAPY: CPT | Performed by: PHYSICAL THERAPIST

## 2017-01-30 RX ORDER — QUETIAPINE FUMARATE 25 MG/1
12.5 TABLET, FILM COATED ORAL EVERY 6 HOURS PRN
Status: DISCONTINUED | OUTPATIENT
Start: 2017-01-30 | End: 2017-01-30

## 2017-01-30 RX ORDER — QUETIAPINE FUMARATE 25 MG/1
12.5 TABLET, FILM COATED ORAL 2 TIMES DAILY PRN
Status: DISCONTINUED | OUTPATIENT
Start: 2017-01-30 | End: 2017-02-03 | Stop reason: HOSPADM

## 2017-01-30 RX ORDER — QUETIAPINE FUMARATE 25 MG/1
25 TABLET, FILM COATED ORAL 2 TIMES DAILY
Status: DISCONTINUED | OUTPATIENT
Start: 2017-01-30 | End: 2017-02-03 | Stop reason: HOSPADM

## 2017-01-30 RX ADMIN — QUETIAPINE 25 MG: 25 TABLET, FILM COATED ORAL at 17:38

## 2017-01-30 RX ADMIN — ISOSORBIDE DINITRATE 30 MG: 10 TABLET ORAL at 12:59

## 2017-01-30 RX ADMIN — MELATONIN TAB 3 MG 6 MG: 3 TAB at 21:59

## 2017-01-30 RX ADMIN — ALPRAZOLAM 0.25 MG: 0.25 TABLET ORAL at 21:59

## 2017-01-30 RX ADMIN — METOPROLOL SUCCINATE 75 MG: 50 TABLET, FILM COATED, EXTENDED RELEASE ORAL at 08:05

## 2017-01-30 RX ADMIN — ESCITALOPRAM OXALATE 10 MG: 10 TABLET, FILM COATED ORAL at 08:04

## 2017-01-30 RX ADMIN — ISOSORBIDE DINITRATE 30 MG: 10 TABLET ORAL at 22:01

## 2017-01-30 RX ADMIN — QUETIAPINE FUMARATE 12.5 MG: 25 TABLET, FILM COATED ORAL at 08:06

## 2017-01-30 RX ADMIN — ISOSORBIDE DINITRATE 30 MG: 10 TABLET ORAL at 17:34

## 2017-01-30 RX ADMIN — BACITRACIN ZINC 1 SMALL APPLICATION: 500 OINTMENT TOPICAL at 08:04

## 2017-01-30 RX ADMIN — TAMSULOSIN HYDROCHLORIDE 0.4 MG: 0.4 CAPSULE ORAL at 17:34

## 2017-01-30 RX ADMIN — ENOXAPARIN SODIUM 40 MG: 40 INJECTION SUBCUTANEOUS at 08:05

## 2017-01-30 RX ADMIN — QUETIAPINE 12.5 MG: 25 TABLET, FILM COATED ORAL at 21:58

## 2017-01-30 RX ADMIN — ISOSORBIDE DINITRATE 30 MG: 10 TABLET ORAL at 08:11

## 2017-01-30 RX ADMIN — BACITRACIN ZINC 1 SMALL APPLICATION: 500 OINTMENT TOPICAL at 17:34

## 2017-01-30 RX ADMIN — LOSARTAN POTASSIUM 100 MG: 50 TABLET, FILM COATED ORAL at 08:05

## 2017-01-30 RX ADMIN — PRAVASTATIN SODIUM 80 MG: 80 TABLET ORAL at 17:34

## 2017-01-31 RX ADMIN — LOSARTAN POTASSIUM 100 MG: 50 TABLET, FILM COATED ORAL at 08:55

## 2017-01-31 RX ADMIN — ESCITALOPRAM OXALATE 10 MG: 10 TABLET, FILM COATED ORAL at 08:56

## 2017-01-31 RX ADMIN — ISOSORBIDE DINITRATE 30 MG: 10 TABLET ORAL at 12:10

## 2017-01-31 RX ADMIN — ISOSORBIDE DINITRATE 30 MG: 10 TABLET ORAL at 08:55

## 2017-01-31 RX ADMIN — QUETIAPINE 25 MG: 25 TABLET, FILM COATED ORAL at 08:55

## 2017-01-31 RX ADMIN — DOCUSATE SODIUM 100 MG: 100 CAPSULE, LIQUID FILLED ORAL at 17:21

## 2017-01-31 RX ADMIN — METOPROLOL SUCCINATE 75 MG: 50 TABLET, FILM COATED, EXTENDED RELEASE ORAL at 08:55

## 2017-01-31 RX ADMIN — ISOSORBIDE DINITRATE 30 MG: 10 TABLET ORAL at 17:21

## 2017-01-31 RX ADMIN — TAMSULOSIN HYDROCHLORIDE 0.4 MG: 0.4 CAPSULE ORAL at 17:22

## 2017-01-31 RX ADMIN — ISOSORBIDE DINITRATE 30 MG: 10 TABLET ORAL at 21:37

## 2017-01-31 RX ADMIN — QUETIAPINE 25 MG: 25 TABLET, FILM COATED ORAL at 17:22

## 2017-01-31 RX ADMIN — PRAVASTATIN SODIUM 80 MG: 80 TABLET ORAL at 17:21

## 2017-01-31 RX ADMIN — CLOPIDOGREL 75 MG: 75 TABLET, FILM COATED ORAL at 08:54

## 2017-01-31 RX ADMIN — MELATONIN TAB 3 MG 6 MG: 3 TAB at 21:37

## 2017-02-01 RX ADMIN — ANTACID TABLETS 500 MG: 500 TABLET, CHEWABLE ORAL at 17:24

## 2017-02-01 RX ADMIN — ESCITALOPRAM OXALATE 10 MG: 10 TABLET, FILM COATED ORAL at 09:50

## 2017-02-01 RX ADMIN — ISOSORBIDE DINITRATE 30 MG: 10 TABLET ORAL at 17:15

## 2017-02-01 RX ADMIN — ISOSORBIDE DINITRATE 30 MG: 10 TABLET ORAL at 23:17

## 2017-02-01 RX ADMIN — ISOSORBIDE DINITRATE 30 MG: 10 TABLET ORAL at 09:51

## 2017-02-01 RX ADMIN — LOSARTAN POTASSIUM 100 MG: 50 TABLET, FILM COATED ORAL at 09:50

## 2017-02-01 RX ADMIN — PRAVASTATIN SODIUM 80 MG: 80 TABLET ORAL at 17:15

## 2017-02-01 RX ADMIN — METOPROLOL SUCCINATE 75 MG: 50 TABLET, FILM COATED, EXTENDED RELEASE ORAL at 09:50

## 2017-02-01 RX ADMIN — QUETIAPINE 25 MG: 25 TABLET, FILM COATED ORAL at 09:49

## 2017-02-01 RX ADMIN — TAMSULOSIN HYDROCHLORIDE 0.4 MG: 0.4 CAPSULE ORAL at 17:20

## 2017-02-01 RX ADMIN — QUETIAPINE 25 MG: 25 TABLET, FILM COATED ORAL at 17:15

## 2017-02-02 RX ADMIN — ESCITALOPRAM OXALATE 10 MG: 10 TABLET, FILM COATED ORAL at 08:19

## 2017-02-02 RX ADMIN — PRAVASTATIN SODIUM 80 MG: 80 TABLET ORAL at 17:02

## 2017-02-02 RX ADMIN — CLOPIDOGREL 75 MG: 75 TABLET, FILM COATED ORAL at 08:19

## 2017-02-02 RX ADMIN — LOSARTAN POTASSIUM 100 MG: 50 TABLET, FILM COATED ORAL at 08:19

## 2017-02-02 RX ADMIN — ISOSORBIDE DINITRATE 30 MG: 10 TABLET ORAL at 12:02

## 2017-02-02 RX ADMIN — ISOSORBIDE DINITRATE 30 MG: 10 TABLET ORAL at 08:19

## 2017-02-02 RX ADMIN — QUETIAPINE 25 MG: 25 TABLET, FILM COATED ORAL at 08:18

## 2017-02-02 RX ADMIN — QUETIAPINE 25 MG: 25 TABLET, FILM COATED ORAL at 17:02

## 2017-02-02 RX ADMIN — ISOSORBIDE DINITRATE 30 MG: 10 TABLET ORAL at 22:48

## 2017-02-02 RX ADMIN — ISOSORBIDE DINITRATE 30 MG: 10 TABLET ORAL at 17:02

## 2017-02-02 RX ADMIN — MELATONIN TAB 3 MG 6 MG: 3 TAB at 22:48

## 2017-02-02 RX ADMIN — TAMSULOSIN HYDROCHLORIDE 0.4 MG: 0.4 CAPSULE ORAL at 17:02

## 2017-02-02 RX ADMIN — METOPROLOL SUCCINATE 75 MG: 50 TABLET, FILM COATED, EXTENDED RELEASE ORAL at 08:18

## 2017-02-03 ENCOUNTER — GENERIC CONVERSION - ENCOUNTER (OUTPATIENT)
Dept: OTHER | Facility: OTHER | Age: 82
End: 2017-02-03

## 2017-02-03 VITALS
WEIGHT: 138.67 LBS | HEART RATE: 60 BPM | TEMPERATURE: 98 F | BODY MASS INDEX: 23.1 KG/M2 | OXYGEN SATURATION: 97 % | HEIGHT: 65 IN | RESPIRATION RATE: 18 BRPM | SYSTOLIC BLOOD PRESSURE: 169 MMHG | DIASTOLIC BLOOD PRESSURE: 79 MMHG

## 2017-02-03 RX ORDER — METOPROLOL SUCCINATE 25 MG/1
75 TABLET, EXTENDED RELEASE ORAL DAILY
Qty: 90 TABLET | Refills: 0 | Status: SHIPPED | OUTPATIENT
Start: 2017-02-03 | End: 2017-05-08

## 2017-02-03 RX ORDER — LIDOCAINE 50 MG/G
1 PATCH TOPICAL DAILY
Qty: 30 PATCH | Refills: 0 | Status: SHIPPED | OUTPATIENT
Start: 2017-02-03 | End: 2017-03-05

## 2017-02-03 RX ORDER — TAMSULOSIN HYDROCHLORIDE 0.4 MG/1
0.4 CAPSULE ORAL
Qty: 30 CAPSULE | Refills: 0 | Status: SHIPPED | OUTPATIENT
Start: 2017-02-03 | End: 2018-06-12

## 2017-02-03 RX ORDER — ALPRAZOLAM 0.25 MG/1
0.25 TABLET ORAL
Qty: 30 TABLET | Refills: 0 | Status: SHIPPED | OUTPATIENT
Start: 2017-02-03 | End: 2017-05-08

## 2017-02-03 RX ORDER — LOSARTAN POTASSIUM 100 MG/1
100 TABLET ORAL DAILY
Qty: 30 TABLET | Refills: 0 | Status: SHIPPED | OUTPATIENT
Start: 2017-02-03 | End: 2017-05-08

## 2017-02-03 RX ORDER — DOCUSATE SODIUM 100 MG/1
100 CAPSULE, LIQUID FILLED ORAL 2 TIMES DAILY
Qty: 60 CAPSULE | Refills: 0 | Status: SHIPPED | OUTPATIENT
Start: 2017-02-03 | End: 2017-05-08

## 2017-02-03 RX ORDER — LANOLIN ALCOHOL/MO/W.PET/CERES
6 CREAM (GRAM) TOPICAL
Qty: 60 TABLET | Refills: 0 | Status: SHIPPED | OUTPATIENT
Start: 2017-02-03 | End: 2017-03-05

## 2017-02-03 RX ORDER — QUETIAPINE FUMARATE 25 MG/1
25 TABLET, FILM COATED ORAL 2 TIMES DAILY
Qty: 60 TABLET | Refills: 0 | Status: SHIPPED | OUTPATIENT
Start: 2017-02-03 | End: 2018-06-12

## 2017-02-03 RX ORDER — QUETIAPINE FUMARATE 25 MG/1
12.5 TABLET, FILM COATED ORAL 2 TIMES DAILY PRN
Qty: 30 TABLET | Refills: 0 | Status: SHIPPED | OUTPATIENT
Start: 2017-02-03 | End: 2017-03-05

## 2017-02-03 RX ADMIN — ISOSORBIDE DINITRATE 30 MG: 10 TABLET ORAL at 07:56

## 2017-02-03 RX ADMIN — QUETIAPINE 25 MG: 25 TABLET, FILM COATED ORAL at 07:56

## 2017-02-03 RX ADMIN — ESCITALOPRAM OXALATE 10 MG: 10 TABLET, FILM COATED ORAL at 07:56

## 2017-02-03 RX ADMIN — METOPROLOL SUCCINATE 75 MG: 50 TABLET, FILM COATED, EXTENDED RELEASE ORAL at 07:55

## 2017-02-03 RX ADMIN — ISOSORBIDE DINITRATE 30 MG: 10 TABLET ORAL at 11:02

## 2017-02-03 RX ADMIN — LOSARTAN POTASSIUM 100 MG: 50 TABLET, FILM COATED ORAL at 07:55

## 2017-04-13 ENCOUNTER — ALLSCRIPTS OFFICE VISIT (OUTPATIENT)
Dept: OTHER | Facility: OTHER | Age: 82
End: 2017-04-13

## 2017-05-08 ENCOUNTER — HOSPITAL ENCOUNTER (EMERGENCY)
Facility: HOSPITAL | Age: 82
Discharge: NURSING, REHAB OR LONG-TERM CARE | End: 2017-05-09
Attending: EMERGENCY MEDICINE
Payer: MEDICARE

## 2017-05-08 ENCOUNTER — GENERIC CONVERSION - ENCOUNTER (OUTPATIENT)
Dept: OTHER | Facility: OTHER | Age: 82
End: 2017-05-08

## 2017-05-08 DIAGNOSIS — N39.0 UTI (URINARY TRACT INFECTION): Primary | ICD-10-CM

## 2017-05-08 DIAGNOSIS — R46.89 AGGRESSIVE BEHAVIOR OF ADULT: ICD-10-CM

## 2017-05-08 LAB
ALBUMIN SERPL BCP-MCNC: 3.3 G/DL (ref 3.5–5)
ALP SERPL-CCNC: 80 U/L (ref 46–116)
ALT SERPL W P-5'-P-CCNC: 35 U/L (ref 12–78)
AMPHETAMINES SERPL QL SCN: NEGATIVE
ANION GAP SERPL CALCULATED.3IONS-SCNC: 7 MMOL/L (ref 4–13)
AST SERPL W P-5'-P-CCNC: 16 U/L (ref 5–45)
BACTERIA UR QL AUTO: ABNORMAL /HPF
BARBITURATES UR QL: NEGATIVE
BASOPHILS # BLD AUTO: 0.05 THOUSANDS/ΜL (ref 0–0.1)
BASOPHILS NFR BLD AUTO: 1 % (ref 0–1)
BENZODIAZ UR QL: NEGATIVE
BILIRUB SERPL-MCNC: 0.35 MG/DL (ref 0.2–1)
BILIRUB UR QL STRIP: NEGATIVE
BUN SERPL-MCNC: 26 MG/DL (ref 5–25)
CALCIUM SERPL-MCNC: 8.5 MG/DL (ref 8.3–10.1)
CHLORIDE SERPL-SCNC: 106 MMOL/L (ref 100–108)
CLARITY UR: CLEAR
CO2 SERPL-SCNC: 30 MMOL/L (ref 21–32)
COCAINE UR QL: NEGATIVE
COLOR UR: YELLOW
COLOR, POC: YELLOW
CREAT SERPL-MCNC: 1.09 MG/DL (ref 0.6–1.3)
EOSINOPHIL # BLD AUTO: 0.35 THOUSAND/ΜL (ref 0–0.61)
EOSINOPHIL NFR BLD AUTO: 4 % (ref 0–6)
ERYTHROCYTE [DISTWIDTH] IN BLOOD BY AUTOMATED COUNT: 13.1 % (ref 11.6–15.1)
GFR SERPL CREATININE-BSD FRML MDRD: >60 ML/MIN/1.73SQ M
GLUCOSE SERPL-MCNC: 91 MG/DL (ref 65–140)
GLUCOSE UR STRIP-MCNC: NEGATIVE MG/DL
HCT VFR BLD AUTO: 33.1 % (ref 36.5–49.3)
HGB BLD-MCNC: 11.5 G/DL (ref 12–17)
HGB UR QL STRIP.AUTO: NEGATIVE
HYALINE CASTS #/AREA URNS LPF: ABNORMAL /LPF
KETONES UR STRIP-MCNC: NEGATIVE MG/DL
LEUKOCYTE ESTERASE UR QL STRIP: ABNORMAL
LYMPHOCYTES # BLD AUTO: 1.25 THOUSANDS/ΜL (ref 0.6–4.47)
LYMPHOCYTES NFR BLD AUTO: 15 % (ref 14–44)
MCH RBC QN AUTO: 34 PG (ref 26.8–34.3)
MCHC RBC AUTO-ENTMCNC: 34.7 G/DL (ref 31.4–37.4)
MCV RBC AUTO: 98 FL (ref 82–98)
METHADONE UR QL: NEGATIVE
MONOCYTES # BLD AUTO: 0.89 THOUSAND/ΜL (ref 0.17–1.22)
MONOCYTES NFR BLD AUTO: 11 % (ref 4–12)
NEUTROPHILS # BLD AUTO: 5.94 THOUSANDS/ΜL (ref 1.85–7.62)
NEUTS SEG NFR BLD AUTO: 69 % (ref 43–75)
NITRITE UR QL STRIP: POSITIVE
NON-SQ EPI CELLS URNS QL MICRO: ABNORMAL /HPF
NRBC BLD AUTO-RTO: 0 /100 WBCS
OPIATES UR QL SCN: NEGATIVE
PCP UR QL: NEGATIVE
PH UR STRIP.AUTO: 5.5 [PH] (ref 4.5–8)
PLATELET # BLD AUTO: 181 THOUSANDS/UL (ref 149–390)
PMV BLD AUTO: 9.2 FL (ref 8.9–12.7)
POTASSIUM SERPL-SCNC: 4 MMOL/L (ref 3.5–5.3)
PROT SERPL-MCNC: 6.8 G/DL (ref 6.4–8.2)
PROT UR STRIP-MCNC: NEGATIVE MG/DL
RBC # BLD AUTO: 3.38 MILLION/UL (ref 3.88–5.62)
RBC #/AREA URNS AUTO: ABNORMAL /HPF
SODIUM SERPL-SCNC: 143 MMOL/L (ref 136–145)
SP GR UR STRIP.AUTO: 1.02 (ref 1–1.03)
T4 FREE SERPL-MCNC: 0.86 NG/DL (ref 0.76–1.46)
THC UR QL: NEGATIVE
TSH SERPL DL<=0.05 MIU/L-ACNC: 4.78 UIU/ML (ref 0.36–3.74)
UROBILINOGEN UR QL STRIP.AUTO: 0.2 E.U./DL
WBC # BLD AUTO: 8.51 THOUSAND/UL (ref 4.31–10.16)
WBC #/AREA URNS AUTO: ABNORMAL /HPF

## 2017-05-08 PROCEDURE — 85025 COMPLETE CBC W/AUTO DIFF WBC: CPT | Performed by: EMERGENCY MEDICINE

## 2017-05-08 PROCEDURE — 81002 URINALYSIS NONAUTO W/O SCOPE: CPT | Performed by: EMERGENCY MEDICINE

## 2017-05-08 PROCEDURE — 81001 URINALYSIS AUTO W/SCOPE: CPT

## 2017-05-08 PROCEDURE — 84443 ASSAY THYROID STIM HORMONE: CPT | Performed by: EMERGENCY MEDICINE

## 2017-05-08 PROCEDURE — 87077 CULTURE AEROBIC IDENTIFY: CPT

## 2017-05-08 PROCEDURE — 36415 COLL VENOUS BLD VENIPUNCTURE: CPT | Performed by: EMERGENCY MEDICINE

## 2017-05-08 PROCEDURE — 80307 DRUG TEST PRSMV CHEM ANLYZR: CPT | Performed by: EMERGENCY MEDICINE

## 2017-05-08 PROCEDURE — 87186 SC STD MICRODIL/AGAR DIL: CPT

## 2017-05-08 PROCEDURE — 80053 COMPREHEN METABOLIC PANEL: CPT | Performed by: EMERGENCY MEDICINE

## 2017-05-08 PROCEDURE — 93005 ELECTROCARDIOGRAM TRACING: CPT | Performed by: EMERGENCY MEDICINE

## 2017-05-08 PROCEDURE — 87086 URINE CULTURE/COLONY COUNT: CPT

## 2017-05-08 PROCEDURE — 84439 ASSAY OF FREE THYROXINE: CPT | Performed by: EMERGENCY MEDICINE

## 2017-05-08 RX ORDER — SULFAMETHOXAZOLE AND TRIMETHOPRIM 800; 160 MG/1; MG/1
1 TABLET ORAL ONCE
Status: COMPLETED | OUTPATIENT
Start: 2017-05-08 | End: 2017-05-09

## 2017-05-08 RX ORDER — HYDRALAZINE HYDROCHLORIDE 25 MG/1
25 TABLET, FILM COATED ORAL DAILY
COMMUNITY

## 2017-05-08 RX ORDER — ATORVASTATIN CALCIUM 20 MG/1
20 TABLET, FILM COATED ORAL DAILY
COMMUNITY

## 2017-05-08 RX ORDER — SULFAMETHOXAZOLE AND TRIMETHOPRIM 800; 160 MG/1; MG/1
1 TABLET ORAL 2 TIMES DAILY
Qty: 14 TABLET | Refills: 0 | Status: SHIPPED | OUTPATIENT
Start: 2017-05-08 | End: 2017-05-15

## 2017-05-09 VITALS
SYSTOLIC BLOOD PRESSURE: 171 MMHG | TEMPERATURE: 98.5 F | OXYGEN SATURATION: 95 % | HEART RATE: 74 BPM | RESPIRATION RATE: 18 BRPM | DIASTOLIC BLOOD PRESSURE: 89 MMHG

## 2017-05-09 LAB
ATRIAL RATE: 70 BPM
P AXIS: 117 DEGREES
PR INTERVAL: 190 MS
QRS AXIS: 71 DEGREES
QRSD INTERVAL: 94 MS
QT INTERVAL: 458 MS
QTC INTERVAL: 494 MS
T WAVE AXIS: 51 DEGREES
VENTRICULAR RATE: 70 BPM

## 2017-05-09 PROCEDURE — 99285 EMERGENCY DEPT VISIT HI MDM: CPT

## 2017-05-09 RX ADMIN — SULFAMETHOXAZOLE AND TRIMETHOPRIM 1 TABLET: 800; 160 TABLET ORAL at 00:01

## 2017-05-11 LAB — BACTERIA UR CULT: NORMAL

## 2017-09-22 ENCOUNTER — ALLSCRIPTS OFFICE VISIT (OUTPATIENT)
Dept: OTHER | Facility: OTHER | Age: 82
End: 2017-09-22

## 2017-10-18 ENCOUNTER — ALLSCRIPTS OFFICE VISIT (OUTPATIENT)
Dept: OTHER | Facility: OTHER | Age: 82
End: 2017-10-18
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
## 2018-01-13 VITALS
BODY MASS INDEX: 26.08 KG/M2 | HEART RATE: 65 BPM | HEIGHT: 65 IN | DIASTOLIC BLOOD PRESSURE: 76 MMHG | SYSTOLIC BLOOD PRESSURE: 118 MMHG | WEIGHT: 156.5 LBS
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
## 2018-01-18 ENCOUNTER — GENERIC CONVERSION - ENCOUNTER (OUTPATIENT)
Dept: OTHER | Facility: OTHER | Age: 83
End: 2018-01-18

## 2018-01-18 ENCOUNTER — ALLSCRIPTS OFFICE VISIT (OUTPATIENT)
Dept: OTHER | Facility: OTHER | Age: 83
End: 2018-01-18

## 2018-01-24 VITALS
SYSTOLIC BLOOD PRESSURE: 114 MMHG | HEART RATE: 70 BPM | DIASTOLIC BLOOD PRESSURE: 70 MMHG | OXYGEN SATURATION: 93 % | HEIGHT: 65 IN

## 2018-03-07 NOTE — PROGRESS NOTES
"  Discussion/Summary  Normal device function     AT  PACs    change of axis noted (can be LAFB with SVT)  has CAD, hence VT also possible     needs to be on beta blocker - unsure if taking it   confirm beta blocker  Results/Data  Cardiac Device In Clinic [id number] 06:31PM Shana Corcoran     Test Name Result Flag Reference   MISCELLANEOUS COMMENT (Report)     DEVICE INTERROGATED IN THE [facility] OFFICE: BATTERY VOLTAGE ADEQUATE (6 5 YRS)  AP 22%  27 2%  ALL LEAD PARAMETERS WITHIN NORMAL LIMITS  2 AHR EPISODES - ATACH FOR 9 6 MIN @ 131 BPM AND MULTIPLE CONDUCTED APBs FOR 16 4 MIN  HX OF SAME  5 CONSECUTIVE VHR EPISODES (13 MIN TOTAL) WITH EGRAMS SHOWING PROBABLE ATACH @ 181 BPM (NARROW COMPLEX BUT WITH AXIS CHANGE)  UNABLE TO PROG FAR FIELD EGRAM STORAGE IN THIS DEVICE  PT TAKES CLOPIDOGREL AND METOPROLOL SUCC  PT IS NOT SURE IF HE HAS BEEN TAKING METOPROLOL  HE WILL CHECK WHEN HE GETS HOME AND CALL FOR REFILL IF NEEDED  EF 50% (ECHO MAY 2015)  AXIS CHANGES CAPTURED ON FROZEN STRIPS  NOTE RHYTHM CHANGING FREQUENTLY BETWEEN AP/VS AND ATACH ON FROZEN STRIPS AT END OF REPORT  ONSET/TERMINATION CAPTURED  DECREASE MADE TO RA AMPLITUDE TO PROMOTE DEVICE LONGEVITY WHILE MAINTAINING AN APPROPRIATE SAFETY MARGIN  PACEMAKER FUNCTIONING APPROPRIATELY  CP   Cardiac Electrophysiology Report      knrxmhkthlxocyastloqgwmaih1ucjs2r65op1e[id number] TAMARA_[id number]_Session Report_01_10_17_1  pdf   DEVICE TYPE Pacemaker       Cardiac Electrophysiology Report [id number] 06:31PM Shana Corcoran     Test Name Result Flag Reference   Cardiac Electrophysiology Report      fmpuhlkvlkstaahzxuuugbnxji1kfdz6b94ov5v[id number]  pdf     Signatures   Electronically signed by : Ethan Holland, ; Chaka 10 2017  3:08PM EST                       (Author)    Electronically signed by : ALEXYS Hummel ; Chaka 15 2017  9:01PM EST                       (Author)    "

## 2018-03-07 NOTE — PROGRESS NOTES
"  Discussion/Summary  Normal device function     Need egms  only markers present  summed egms are narrow and may represent rate related BBB  program with far field sensing on to compare morphology     Results/Data  Cardiac Device Remote [id number] 07:31PM Maegan Smaller     Test Name Result Flag Reference   MISCELLANEOUS COMMENT      CARELINK TRANSMISSION: BATTERY VOLTAGE ADEQUATE (7 YRS)  AP-21%, -27%  4 NEW AHR EPISODES OF A TACH LONGEST 12 MINS  HX OF SAME  PT ON CLOPIDOGREL & METOPROLOL  ALL AVAILABLE LEAD PARAMETERS WITHIN NORMAL LIMITS  NORMAL DEVICE FUNCTION  GV   Cardiac Electrophysiology Report      azhqvbsceamnktsoiivgaxlukc0yfam5s21gt6y37s4t32xl[id number]  pdf   DEVICE TYPE Pacemaker       Cardiac Electrophysiology Report [id number] 07:31PM Maegan Smaller     Test Name Result Flag Reference   Cardiac Electrophysiology Report      ecgihzttovyjgsiqgowwbrynva8uthb5j25tu2q91o5v42lz5oxg59tjy74762ciiv24803n6oxo4t076u32g8aq1  pdf     Signatures   Electronically signed by : Geri Kinsey RN; Dec 28 2016  1:46PM EST                       (Author)    Electronically signed by : ALEXYS Rutherford ; Dec 28 2016  9:46PM EST                       (Author)    "

## 2018-03-07 NOTE — PROGRESS NOTES
"  Discussion/Summary  Normal device function      Results/Data  Cardiac Device Remote [id number] 05:05PM Laly Dickinson     Test Name Result Flag Reference   MISCELLANEOUS COMMENT (Report)     CARELINK TRANSMISSION: BATTERY VOLTAGE ADEQUATE (5 5 YRS)  AP-50%, -10%  ALL AVAILABLE LEAD PARAMETERS WITHIN NORMAL LIMITS  1 VHR EPISODE PREVIOUSLY ADDRESSED  [address] [facility] PT HAS DEMENTIA W/ HX OF MECHANICAL FALL  HX: PAF PER ER NOTE  PT ON CLOPIDOGREL & METOPROLOL  AF BURDEN-4 1%  SCHEDULED APPT W/ ARCHANA NOONAN PER DR Gena Baird  [address]  NORMAL DEVICE FUNCTION  GV   Cardiac Electrophysiology Report      AS[id number] Health Centeregel_Luther_[id number]  pdf   DEVICE TYPE Pacemaker       Cardiac Electrophysiology Report [id number] 05:05PM Laly Dickinson     Test Name Result Flag Reference   Cardiac Electrophysiology Report      [id number]  pdf     Signatures   Electronically signed by : Brigitte Vinson RN; Sep 25 2017  4:36PM EST                       (Author)    Electronically signed by : ALEXYS Black ; Sep 25 2017  5:16PM EST                       (Author)    "

## 2018-03-07 NOTE — PROGRESS NOTES
"  Discussion/Summary  Normal device function      Results/Data  Cardiac Device Remote [id number] 10:38AM Toro Marlow     Test Name Result Flag Reference   MISCELLANEOUS COMMENT (Report)     CARELINK TRANSMISSION: BATTERY VOLTAGE ADEQUATE  (6 5 YRS)  AP 37%  10%  ALL AVAILABLE LEAD PARAMETERS WITHIN NORMAL LIMITS  [address], [address] BEATS @ 320ms  ATACH NOTED  HISTORY OF THE SAME  PATIENT IS ON METOPROLOL SUCC AND CLOPIDOGREL  NORMAL DEVICE FUNCTION  ---OLIVAS   Cardiac Electrophysiology Report      sl[id number] Health Services_Luther_[id number]_[id number]  pdf   DEVICE TYPE Pacemaker       Cardiac Electrophysiology Report 13Apr2017 10:38AM Toro Marlow     Test Name Result Flag Reference   Cardiac Electrophysiology Report      bnetqnnjunhfgkyuxmuecqmptw4yfie9w01ie9t53f1a85wt9mof[id number]  pdf     Signatures   Electronically signed by : Krystin Loyd, ; Apr 19 2017  9:26AM EST                       (Author)    Electronically signed by : Nathaly Martines DO;  Apr 28 2017  7:46PM EST                       (Author)    "

## 2018-03-07 NOTE — PROGRESS NOTES
"  Discussion/Summary  Normal device function     Long episodes of VT    need to set up urgent visit   - discuss anti- arrhythmics   - device upgrade to ICD   - venogram  -repeat device transmission now and bi-weekly     Results/Data  Cardiac Device Remote 06Jun2016 03:15PM Donnia Osgood     Test Name Result Flag Reference   MISCELLANEOUS COMMENT (Report)     CARELINK TRANSMISSION: U4MPGJYJOZ VOLTAGE ADEQUATE  (7 5 YRS)  AP 19%  23%  ALL AVAILABLE LEAD PARAMETERS WITHIN NORMAL LIMITS  MULTIPLE AHR EPISODES DETECTED ALL EGMS ARE AT  HISTORY OF THE SAME PATIENT IS ON METOPROLOL AND CLIOPIDOGREL  NORMAL DEVICE FUNCTION  ---OLIVAS   Cardiac Electrophysiology Report      ftsnknnbzwmnongkbgkbjgbybk0bzqx5v76kg7s10c5w[id number]}  pdf   DEVICE TYPE Pacemaker       Cardiac Electrophysiology Report [id number] 03:15PM Donnia Osgood     Test Name Result Flag Reference   Cardiac Electrophysiology Report      jeaeeiyhfixlbpzbvlihpqkquf6zxir1j37kp2j85v1o35sl9sj[id number]  pdf     Signatures   Electronically signed by : Dain Contreras, ; Jun 8 2016  2:57PM EST                       (Author)    Electronically signed by : ALEXYS Rees ; Jul 24 2016 10:25PM EST                       (Author)    "

## 2018-03-07 NOTE — PROGRESS NOTES
"  Discussion/Summary  Normal device function     AHR  may be SVT vs A flutter  follow closely to decide anticoagulation needs  Results/Data  Cardiac Device Remote [id number] 09:31AM Roma Rodriguez     Test Name Result Flag Reference   MISCELLANEOUS COMMENT (Report)     NONBILLABLE- CARELINK TRANSMISSION TO CHECK EPISODES PER DR NICHOLSON: 13 NEW AHR EPISODES OF AT LONGEST 53 3 MINS  HX OF SAME  PT ON ASA 81MG, CLOPIDOGREL & METOPROLOL  NO NEW VHR EPISODES  WILL RECHECK FOR EPISODES IN 1 MONTH  BATTERY VOLTAGE ADEQUATE (7 YRS)  AP-19%, -26%  ALL AVAILABLE LEAD PARAMETERS WITHIN NORMAL LIMITS  NORMAL DEVICE FUNCTION  GV   Cardiac Electrophysiology Report      pfevdyoclwegxmrowpoqzljlfe6fwom5s04qj0[id number]}  pdf   DEVICE TYPE Pacemaker       Cardiac Electrophysiology Report [id number] 09:31AM Roma Rodriguez     Test Name Result Flag Reference   Cardiac Electrophysiology Report      ucuasamdikaqlmnbfcyhtkgqut4witk1e11wb2t88e6d51[id number]  pdf     Signatures   Electronically signed by : Paulette Pitts RN; Aug  1 2016 12:27PM EST                       (Author)    Electronically signed by : ALEXYS Kenyon ; Sep 18 2016  2:44PM EST                       (Author)    "

## 2018-03-07 NOTE — PROGRESS NOTES
"  Discussion/Summary  Normal device function      Results/Data  Cardiac Device In Clinic [id number] 08:07PM Effie Nelson     Test Name Result Flag Reference   MISCELLANEOUS COMMENT (Report)     CARELINK TRANSMISSION: BATTERY VOLTAGE ADEQUATE  (5 YRS)  AP 44%  11%  ALL LEAD PARAMETERS WITHIN NORMAL LIMITS  MULTIPLE AHR EPISODES DETECTED ( IN AF CURRENTLY)  PATIENT HAS DEMENTIA W/ HX OF FALLS  HISTORY OF PAF PER NOTE  PATIENT ON CLOPIDOGREL & METOPROLOL  HE SAW DR Mukesh Sorensen TODAY  NO PROGRAMMING CHANGES MADE TO DEVICE PARAMETERS  NORMAL DEVICE FUNCTION  ---OLIVAS   Cardiac Electrophysiology Report      [id number] [id number] [id number]_1  pdf   DEVICE TYPE Pacemaker       Cardiac Electrophysiology Report [id number] 08:07PM Effie Nelson     Test Name Result Flag Reference   Cardiac Electrophysiology Report      [id number]  pdf     Signatures   Electronically signed by : Armin Parra, ; Jan 19 2018 11:56AM EST                       (Author)    Electronically signed by : ALEXYS Bess ; Jan 19 2018  1:33PM EST                       (Author)    "

## 2018-03-07 NOTE — PROGRESS NOTES
"  Discussion/Summary  Normal device function     Multiple episodes noted    elevated V rate:  V>A  change in axis noted - NSVT or SVT developing LAHB  with known CAD have to suspect VT    Recommend:  Increase beta blockers - metoprolol succinate to 50 mg BID  Also record egms - necessary to decide if defibrillator needed  all episodes on one day - history for anything different that day      SVT also noted  Need to increase beta blocker as in first instance     Results/Data  Results   Cardiac Device Remote [id number] 07:20PM Cesar Barron     Test Name Result Flag Reference   MISCELLANEOUS COMMENT (Report)     CARELINK TRANSMISSION: BATTERY VOLTAGE ADEQUATE (8 YRS)  AP-15%, -21%  4 CONSECUTIVE VHR EPISODES ON 1/17/16 LONGEST 4 6 MINS @ 188 BPM  EPISODES APPEAR TO BE SVT ON EGM'S BUT CANNOT EXCLUDE NSVT (MORPHOLOGY/AXIS CHANGE ON AVAILABLE EGM'S)  EF-50% (ECHO 5/6/15)  336 AHR EPISODES LONGEST 5 5 HRS (NO EGM FOR THIS EPISODE)  AT/SVT ON AVAILABLE EGM'S  PT ON CLOPIDOGREL & METOPROLOL  TASKED DR Radha Arenas  ALL AVAILABLE LEAD PARAMETERS WITHIN NORMAL LIMITS  NORMAL DEVICE FUNCTION  GV   Cardiac Electrophysiology Report      [id number]}  pdf   DEVICE TYPE Pacemaker       Cardiac Electrophysiology Report [id number] 07:20PM Cesar Barron     Test Name Result Flag Reference   Cardiac Electrophysiology Report      zjvxvvcxlbnqof[id number]  pdf     Signatures   Electronically signed by : Hilda Berumen RN; Mar  4 2016 10:23AM EST                       (Author)    Electronically signed by : ALEXYS Long ; Mar  7 2016  6:40AM EST                       (Author)    "

## 2018-03-07 NOTE — PROGRESS NOTES
"  Discussion/Summary  Normal device function     Brief AT  continue beta blockers  Results/Data  Cardiac Device Remote 08Sep2016 01:41PM Washington Faes     Test Name Result Flag Reference   MISCELLANEOUS COMMENT      CARELINK TRANSMISSION: BATTERY VOLTAGE ADEQUATE (7 YRS)  AP-19%, -27%  1 NEW AHR EPISODE OF A TACH LASTING 2 6 MINS  HX OF SAME  PT ON CLOPIDOGREL & METOPROLOL  ALL AVAILABLE LEAD PARAMETERS WITHIN NORMAL LIMITS  NORMAL DEVICE FUNCTION  GV   Cardiac Electrophysiology Report      sl[id number]_[id number]_[id number]  pdf   DEVICE TYPE Pacemaker       Cardiac Electrophysiology Report 08Sep2016 01:41PM Washington Faes     Test Name Result Flag Reference   Cardiac Electrophysiology Report      vxwipoqvnyuqs[id number] pdf     Signatures   Electronically signed by : Lenka Vegas RN; Sep  9 2016  1:53PM EST                       (Author)    Electronically signed by : ALEXYS Feldman ; Sep 17 2016 10:46PM EST                       (Author)    "

## 2018-04-19 ENCOUNTER — IN-CLINIC DEVICE VISIT (OUTPATIENT)
Dept: CARDIOLOGY CLINIC | Facility: CLINIC | Age: 83
End: 2018-04-19
Payer: MEDICARE

## 2018-04-19 DIAGNOSIS — I48.0 PAROXYSMAL ATRIAL FIBRILLATION (HCC): ICD-10-CM

## 2018-04-19 DIAGNOSIS — I49.5 SICK SINUS SYNDROME (HCC): Primary | ICD-10-CM

## 2018-04-19 DIAGNOSIS — Z95.0 CARDIAC PACEMAKER IN SITU: ICD-10-CM

## 2018-04-19 PROCEDURE — 93296 REM INTERROG EVL PM/IDS: CPT

## 2018-04-19 PROCEDURE — 93294 REM INTERROG EVL PM/LDLS PM: CPT

## 2018-04-20 NOTE — PROGRESS NOTES
Results for orders placed or performed in visit on 04/19/18   Cardiac EP device report    Narrative    MDT DUAL PM  DUAL PM CARELINK TRANSMISSION: BATTERY VOLTAGE ADEQUATE (4 5 YRS)  AP-1%, -12%  ALL AVAILABLE LEAD PARAMETERS WITHIN NORMAL LIMITS  [place]  HX: PAF & ON ASA 81MG, CLOPIDOGREL & METOPROLOL; NO ANTICOAGULATION DUE TO DEMENTIA W/ HX OF MECHANICAL FALL  AF BURDEN-99 7%  NORMAL DEVICE FUNCTION   GV

## 2018-05-10 LAB
ABSOL LYMPHOCYTES (HISTORICAL): 0.6 K/UL (ref 0.5–4)
ALBUMIN SERPL BCP-MCNC: 3.4 G/DL (ref 3–5.2)
ALP SERPL-CCNC: 75 U/L (ref 43–122)
ALT SERPL W P-5'-P-CCNC: 25 U/L (ref 9–52)
ANION GAP SERPL CALCULATED.3IONS-SCNC: 12 MMOL/L (ref 5–14)
AST SERPL W P-5'-P-CCNC: 22 U/L (ref 17–59)
B-NP NT PRO (HISTORICAL): 6940 PG/ML
BANDS (HISTORICAL): 14 % (ref 3–11)
BILIRUB SERPL-MCNC: 0.6 MG/DL
BUN SERPL-MCNC: 75 MG/DL (ref 5–25)
CALCIUM SERPL-MCNC: 8.8 MG/DL (ref 8.4–10.2)
CHLORIDE SERPL-SCNC: 99 MEQ/L (ref 97–108)
CO2 SERPL-SCNC: 28 MMOL/L (ref 22–30)
COMMENT (HISTORICAL): ABNORMAL
CREATINE, SERUM (HISTORICAL): 2.55 MG/DL (ref 0.7–1.5)
DEPRECATED RDW RBC AUTO: 16.6 %
EGFR (HISTORICAL): 24 ML/MIN/1.73 M2
GLUCOSE SERPL-MCNC: 127 MG/DL (ref 70–99)
HCT VFR BLD AUTO: 24.1 % (ref 41–53)
HGB BLD-MCNC: 8.1 G/DL (ref 13.5–17.5)
IRON SERPL-MCNC: 38 UG/DL (ref 49–181)
LYMPHOCYTES NFR BLD AUTO: 4 % (ref 25–45)
MCH RBC QN AUTO: 34.8 PG (ref 26–34)
MCHC RBC AUTO-ENTMCNC: 33.6 % (ref 31–36)
MCV RBC AUTO: 104 FL (ref 80–100)
MONOCYTES # BLD AUTO: 1.5 K/UL (ref 0.2–0.9)
MONOCYTES NFR BLD AUTO: 10 % (ref 1–10)
NEUTROPHILS ABS COUNT (HISTORICAL): 12.9 K/UL (ref 1.8–7.8)
NEUTS SEG NFR BLD AUTO: 72 % (ref 45–65)
PERCENT SATURATION (HISTORICAL): 12 % (ref 11–46)
PLATELET # BLD AUTO: 230 K/MCL (ref 150–450)
POTASSIUM SERPL-SCNC: 5.3 MEQ/L (ref 3.6–5)
RBC # BLD AUTO: 2.32 M/MCL (ref 4.5–5.9)
RBC MORPHOLOGY (HISTORICAL): ABNORMAL
SODIUM SERPL-SCNC: 139 MEQ/L (ref 137–147)
TIBC SERPL-MCNC: 319 UG/DL (ref 261–462)
TOTAL PROTEIN (HISTORICAL): 7.3 G/DL (ref 5.9–8.4)
WBC # BLD AUTO: 15 K/MCL (ref 4.5–11)

## 2018-05-11 ENCOUNTER — HOSPITAL ENCOUNTER (INPATIENT)
Facility: HOSPITAL | Age: 83
LOS: 12 days | Discharge: NURSING, REHAB OR LONG-TERM CARE | DRG: 871 | End: 2018-05-23
Attending: INTERNAL MEDICINE | Admitting: INTERNAL MEDICINE
Payer: MEDICARE

## 2018-05-11 ENCOUNTER — APPOINTMENT (INPATIENT)
Dept: RADIOLOGY | Facility: HOSPITAL | Age: 83
DRG: 871 | End: 2018-05-11
Payer: MEDICARE

## 2018-05-11 ENCOUNTER — TELEPHONE (OUTPATIENT)
Dept: OTHER | Facility: HOSPITAL | Age: 83
End: 2018-05-11

## 2018-05-11 DIAGNOSIS — E03.9 HYPOTHYROIDISM: ICD-10-CM

## 2018-05-11 DIAGNOSIS — J44.1 CHRONIC OBSTRUCTIVE PULMONARY DISEASE WITH ACUTE EXACERBATION (HCC): ICD-10-CM

## 2018-05-11 DIAGNOSIS — N17.9 AKI (ACUTE KIDNEY INJURY) (HCC): ICD-10-CM

## 2018-05-11 DIAGNOSIS — E87.6 HYPOKALEMIA: ICD-10-CM

## 2018-05-11 DIAGNOSIS — K92.2 GASTROINTESTINAL HEMORRHAGE, UNSPECIFIED GASTROINTESTINAL HEMORRHAGE TYPE: ICD-10-CM

## 2018-05-11 DIAGNOSIS — R13.19 OTHER DYSPHAGIA: ICD-10-CM

## 2018-05-11 DIAGNOSIS — R33.9 URINARY RETENTION: Primary | ICD-10-CM

## 2018-05-11 DIAGNOSIS — E87.0 ACUTE HYPERNATREMIA: ICD-10-CM

## 2018-05-11 PROBLEM — A41.9 SEPSIS (HCC): Status: ACTIVE | Noted: 2018-05-11

## 2018-05-11 LAB
ABSOL LYMPHOCYTES (HISTORICAL): 0.4 K/UL (ref 0.5–4)
ALBUMIN SERPL BCP-MCNC: 2.2 G/DL (ref 3.5–5)
ALBUMIN SERPL BCP-MCNC: 2.9 G/DL (ref 3–5.2)
ALP SERPL-CCNC: 69 U/L (ref 46–116)
ALP SERPL-CCNC: 71 U/L (ref 43–122)
ALT SERPL W P-5'-P-CCNC: 19 U/L (ref 12–78)
ALT SERPL W P-5'-P-CCNC: 29 U/L (ref 9–52)
ANION GAP SERPL CALCULATED.3IONS-SCNC: 13 MMOL/L (ref 4–13)
ANION GAP SERPL CALCULATED.3IONS-SCNC: 9 MMOL/L (ref 5–14)
AST SERPL W P-5'-P-CCNC: 30 U/L (ref 17–59)
AST SERPL W P-5'-P-CCNC: 34 U/L (ref 5–45)
BACTERIA UR QL AUTO: ABNORMAL /HPF
BANDS (HISTORICAL): 31 % (ref 3–11)
BASOPHILS # BLD AUTO: 0.02 THOUSANDS/ΜL (ref 0–0.1)
BASOPHILS NFR BLD AUTO: 0 % (ref 0–1)
BILIRUB SERPL-MCNC: 0.49 MG/DL (ref 0.2–1)
BILIRUB SERPL-MCNC: 0.5 MG/DL
BILIRUB UR QL STRIP: NEGATIVE
BUN SERPL-MCNC: 75 MG/DL (ref 5–25)
BUN SERPL-MCNC: 76 MG/DL (ref 5–25)
CALCIUM SERPL-MCNC: 8.1 MG/DL (ref 8.3–10.1)
CALCIUM SERPL-MCNC: 8.6 MG/DL (ref 8.4–10.2)
CALLED AND READ BACK BY. (HISTORICAL): NORMAL
CHLORIDE SERPL-SCNC: 101 MEQ/L (ref 97–108)
CHLORIDE SERPL-SCNC: 105 MMOL/L (ref 100–108)
CHOLEST SERPL-MCNC: 120 MG/DL
CHOLEST/HDLC SERPL: 3.8 {RATIO}
CLARITY UR: ABNORMAL
CO2 SERPL-SCNC: 25 MMOL/L (ref 21–32)
CO2 SERPL-SCNC: 27 MMOL/L (ref 22–30)
COLOR UR: YELLOW
COMMENT (HISTORICAL): ABNORMAL
CREAT SERPL-MCNC: 2.82 MG/DL (ref 0.6–1.3)
CREATINE, SERUM (HISTORICAL): 3.02 MG/DL (ref 0.7–1.5)
DEPRECATED RDW RBC AUTO: 16.6 %
EGFR (HISTORICAL): 20 ML/MIN/1.73 M2
EOSINOPHIL # BLD AUTO: 0.01 THOUSAND/ΜL (ref 0–0.61)
EOSINOPHIL NFR BLD AUTO: 0 % (ref 0–6)
ERYTHROCYTE [DISTWIDTH] IN BLOOD BY AUTOMATED COUNT: 16 % (ref 11.6–15.1)
EST. AVERAGE GLUCOSE BLD GHB EST-MCNC: 123 MG/DL
FERRITIN SERPL-MCNC: 178 NG/ML (ref 18–464)
FOLATE SERPL-MCNC: 11.2 NG/ML
GFR SERPL CREATININE-BSD FRML MDRD: 20 ML/MIN/1.73SQ M
GLUCOSE SERPL-MCNC: 106 MG/DL (ref 70–99)
GLUCOSE SERPL-MCNC: 113 MG/DL (ref 65–140)
GLUCOSE SERPL-MCNC: 156 MG/DL (ref 70–99)
GLUCOSE UR STRIP-MCNC: NEGATIVE MG/DL
HBA1C MFR BLD HPLC: 5.9 %
HCT VFR BLD AUTO: 25.9 % (ref 41–53)
HCT VFR BLD AUTO: 26.6 % (ref 36.5–49.3)
HDLC SERPL-MCNC: 32 MG/DL
HGB BLD-MCNC: 8.4 G/DL (ref 13.5–17.5)
HGB BLD-MCNC: 8.7 G/DL (ref 12–17)
HGB UR QL STRIP.AUTO: ABNORMAL
KETONES UR STRIP-MCNC: NEGATIVE MG/DL
LACTATE SERPL-SCNC: 1.3 MMOL/L (ref 0.5–2)
LACTATE SERPL-SCNC: 1.4 MMOL/L (ref 0.7–2.1)
LACTATE SERPL-SCNC: 1.5 MMOL/L (ref 0.7–2.1)
LACTATE SERPL-SCNC: 2.5 MMOL/L (ref 0.5–2)
LDL/HDL RATIO (HISTORICAL): 2.4
LDLC SERPL CALC-MCNC: 76 MG/DL
LEUKOCYTE ESTERASE UR QL STRIP: ABNORMAL
LYMPHOCYTES # BLD AUTO: 1.09 THOUSANDS/ΜL (ref 0.6–4.47)
LYMPHOCYTES NFR BLD AUTO: 2 % (ref 25–45)
LYMPHOCYTES NFR BLD AUTO: 5 % (ref 14–44)
MCH RBC QN AUTO: 34.2 PG (ref 26–34)
MCH RBC QN AUTO: 35.4 PG (ref 26.8–34.3)
MCHC RBC AUTO-ENTMCNC: 32.6 % (ref 31–36)
MCHC RBC AUTO-ENTMCNC: 32.7 G/DL (ref 31.4–37.4)
MCV RBC AUTO: 105 FL (ref 80–100)
MCV RBC AUTO: 108 FL (ref 82–98)
MONOCYTES # BLD AUTO: 1.77 THOUSAND/ΜL (ref 0.17–1.22)
MONOCYTES # BLD AUTO: 2.1 K/UL (ref 0.2–0.9)
MONOCYTES NFR BLD AUTO: 10 % (ref 1–10)
MONOCYTES NFR BLD AUTO: 7 % (ref 4–12)
MYELOCYTES (HISTORICAL): 1 %
NEUTROPHILS # BLD AUTO: 21.49 THOUSANDS/ΜL (ref 1.85–7.62)
NEUTROPHILS ABS COUNT (HISTORICAL): 18.8 K/UL (ref 1.8–7.8)
NEUTS SEG NFR BLD AUTO: 56 % (ref 45–65)
NEUTS SEG NFR BLD AUTO: 88 % (ref 43–75)
NITRITE UR QL STRIP: POSITIVE
NON-SQ EPI CELLS URNS QL MICRO: ABNORMAL /HPF
NRBC BLD AUTO-RTO: 0 /100 WBCS
PH UR STRIP.AUTO: 6 [PH] (ref 4.5–8)
PLATELET # BLD AUTO: 241 THOUSANDS/UL (ref 149–390)
PLATELET # BLD AUTO: 258 K/MCL (ref 150–450)
PMV BLD AUTO: 9.3 FL (ref 8.9–12.7)
POTASSIUM SERPL-SCNC: 4.6 MEQ/L (ref 3.6–5)
POTASSIUM SERPL-SCNC: 4.7 MMOL/L (ref 3.5–5.3)
PROT SERPL-MCNC: 7.3 G/DL (ref 6.4–8.2)
PROT UR STRIP-MCNC: ABNORMAL MG/DL
RBC # BLD AUTO: 2.46 MILLION/UL (ref 3.88–5.62)
RBC # BLD AUTO: 2.47 M/MCL (ref 4.5–5.9)
RBC #/AREA URNS AUTO: ABNORMAL /HPF
RBC MORPHOLOGY (HISTORICAL): ABNORMAL
SODIUM SERPL-SCNC: 137 MEQ/L (ref 137–147)
SODIUM SERPL-SCNC: 143 MMOL/L (ref 136–145)
SP GR UR STRIP.AUTO: 1.01 (ref 1–1.03)
TOTAL PROTEIN (HISTORICAL): 6.4 G/DL (ref 5.9–8.4)
TRIGL SERPL-MCNC: 62 MG/DL
TROPONIN I SERPL-MCNC: 0.07 NG/ML (ref 0–0.03)
TSH SERPL DL<=0.05 MIU/L-ACNC: 4.65 UIU/ML (ref 0.47–4.68)
UROBILINOGEN UR QL STRIP.AUTO: 1 E.U./DL
VIT B12 SERPL-MCNC: 832 PG/ML (ref 239–931)
VLDLC SERPL CALC-MCNC: 12 MG/DL (ref 0–40)
WBC # BLD AUTO: 21.3 K/MCL (ref 4.5–11)
WBC # BLD AUTO: 24.38 THOUSAND/UL (ref 4.31–10.16)
WBC #/AREA URNS AUTO: ABNORMAL /HPF

## 2018-05-11 PROCEDURE — 71045 X-RAY EXAM CHEST 1 VIEW: CPT

## 2018-05-11 PROCEDURE — 36556 INSERT NON-TUNNEL CV CATH: CPT | Performed by: NURSE PRACTITIONER

## 2018-05-11 PROCEDURE — 85025 COMPLETE CBC W/AUTO DIFF WBC: CPT | Performed by: NURSE PRACTITIONER

## 2018-05-11 PROCEDURE — 02HV33Z INSERTION OF INFUSION DEVICE INTO SUPERIOR VENA CAVA, PERCUTANEOUS APPROACH: ICD-10-PCS | Performed by: INTERNAL MEDICINE

## 2018-05-11 PROCEDURE — 92610 EVALUATE SWALLOWING FUNCTION: CPT

## 2018-05-11 PROCEDURE — 83605 ASSAY OF LACTIC ACID: CPT | Performed by: NURSE PRACTITIONER

## 2018-05-11 PROCEDURE — 87040 BLOOD CULTURE FOR BACTERIA: CPT | Performed by: NURSE PRACTITIONER

## 2018-05-11 PROCEDURE — 81001 URINALYSIS AUTO W/SCOPE: CPT | Performed by: NURSE PRACTITIONER

## 2018-05-11 PROCEDURE — 99291 CRITICAL CARE FIRST HOUR: CPT | Performed by: INTERNAL MEDICINE

## 2018-05-11 PROCEDURE — 80053 COMPREHEN METABOLIC PANEL: CPT | Performed by: NURSE PRACTITIONER

## 2018-05-11 PROCEDURE — 99222 1ST HOSP IP/OBS MODERATE 55: CPT | Performed by: PHYSICIAN ASSISTANT

## 2018-05-11 PROCEDURE — 87086 URINE CULTURE/COLONY COUNT: CPT | Performed by: NURSE PRACTITIONER

## 2018-05-11 PROCEDURE — 0T9B70Z DRAINAGE OF BLADDER WITH DRAINAGE DEVICE, VIA NATURAL OR ARTIFICIAL OPENING: ICD-10-PCS | Performed by: UROLOGY

## 2018-05-11 RX ORDER — CLOPIDOGREL BISULFATE 75 MG/1
75 TABLET ORAL EVERY OTHER DAY
Status: DISCONTINUED | OUTPATIENT
Start: 2018-05-11 | End: 2018-05-13

## 2018-05-11 RX ORDER — ACETAMINOPHEN 325 MG/1
650 TABLET ORAL EVERY 6 HOURS PRN
Status: DISCONTINUED | OUTPATIENT
Start: 2018-05-11 | End: 2018-05-23 | Stop reason: HOSPADM

## 2018-05-11 RX ORDER — LEVOTHYROXINE SODIUM 0.05 MG/1
50 TABLET ORAL
Status: DISCONTINUED | OUTPATIENT
Start: 2018-05-11 | End: 2018-05-12

## 2018-05-11 RX ORDER — DOCUSATE SODIUM 100 MG/1
100 CAPSULE, LIQUID FILLED ORAL 2 TIMES DAILY
Status: DISCONTINUED | OUTPATIENT
Start: 2018-05-11 | End: 2018-05-23 | Stop reason: HOSPADM

## 2018-05-11 RX ORDER — SODIUM CHLORIDE 9 MG/ML
75 INJECTION, SOLUTION INTRAVENOUS CONTINUOUS
Status: DISCONTINUED | OUTPATIENT
Start: 2018-05-11 | End: 2018-05-13

## 2018-05-11 RX ORDER — SODIUM CHLORIDE 9 MG/ML
1800 INJECTION, SOLUTION INTRAVENOUS ONCE
Status: COMPLETED | OUTPATIENT
Start: 2018-05-11 | End: 2018-05-11

## 2018-05-11 RX ORDER — ATORVASTATIN CALCIUM 20 MG/1
20 TABLET, FILM COATED ORAL
Status: DISCONTINUED | OUTPATIENT
Start: 2018-05-11 | End: 2018-05-23 | Stop reason: HOSPADM

## 2018-05-11 RX ORDER — LIDOCAINE 50 MG/G
1 PATCH TOPICAL DAILY
Status: DISCONTINUED | OUTPATIENT
Start: 2018-05-11 | End: 2018-05-23 | Stop reason: HOSPADM

## 2018-05-11 RX ORDER — QUETIAPINE FUMARATE 25 MG/1
25 TABLET, FILM COATED ORAL 2 TIMES DAILY
Status: DISCONTINUED | OUTPATIENT
Start: 2018-05-11 | End: 2018-05-23 | Stop reason: HOSPADM

## 2018-05-11 RX ORDER — MIDODRINE HYDROCHLORIDE 5 MG/1
10 TABLET ORAL ONCE
Status: COMPLETED | OUTPATIENT
Start: 2018-05-11 | End: 2018-05-11

## 2018-05-11 RX ORDER — LEVALBUTEROL 1.25 MG/.5ML
1.25 SOLUTION, CONCENTRATE RESPIRATORY (INHALATION) EVERY 8 HOURS PRN
Status: DISCONTINUED | OUTPATIENT
Start: 2018-05-11 | End: 2018-05-18

## 2018-05-11 RX ORDER — HEPARIN SODIUM 5000 [USP'U]/ML
5000 INJECTION, SOLUTION INTRAVENOUS; SUBCUTANEOUS EVERY 8 HOURS SCHEDULED
Status: DISCONTINUED | OUTPATIENT
Start: 2018-05-11 | End: 2018-05-12

## 2018-05-11 RX ORDER — ESCITALOPRAM OXALATE 10 MG/1
10 TABLET ORAL DAILY
Status: DISCONTINUED | OUTPATIENT
Start: 2018-05-11 | End: 2018-05-23 | Stop reason: HOSPADM

## 2018-05-11 RX ORDER — PANTOPRAZOLE SODIUM 40 MG/1
40 TABLET, DELAYED RELEASE ORAL DAILY PRN
Status: DISCONTINUED | OUTPATIENT
Start: 2018-05-11 | End: 2018-05-23 | Stop reason: HOSPADM

## 2018-05-11 RX ADMIN — LIDOCAINE 1 PATCH: 50 PATCH CUTANEOUS at 13:53

## 2018-05-11 RX ADMIN — HEPARIN SODIUM 5000 UNITS: 5000 INJECTION, SOLUTION INTRAVENOUS; SUBCUTANEOUS at 21:52

## 2018-05-11 RX ADMIN — LEVOTHYROXINE SODIUM 50 MCG: 50 TABLET ORAL at 14:01

## 2018-05-11 RX ADMIN — ESCITALOPRAM OXALATE 10 MG: 10 TABLET ORAL at 13:52

## 2018-05-11 RX ADMIN — ATORVASTATIN CALCIUM 20 MG: 20 TABLET, FILM COATED ORAL at 19:45

## 2018-05-11 RX ADMIN — DOCUSATE SODIUM 100 MG: 100 CAPSULE, LIQUID FILLED ORAL at 13:51

## 2018-05-11 RX ADMIN — QUETIAPINE FUMARATE 25 MG: 25 TABLET ORAL at 13:52

## 2018-05-11 RX ADMIN — QUETIAPINE FUMARATE 25 MG: 25 TABLET ORAL at 19:45

## 2018-05-11 RX ADMIN — SODIUM CHLORIDE 1800 ML/HR: 0.9 INJECTION, SOLUTION INTRAVENOUS at 12:51

## 2018-05-11 RX ADMIN — NOREPINEPHRINE BITARTRATE 8 MCG/MIN: 1 INJECTION INTRAVENOUS at 16:58

## 2018-05-11 RX ADMIN — MIDODRINE HYDROCHLORIDE 10 MG: 5 TABLET ORAL at 14:48

## 2018-05-11 RX ADMIN — HEPARIN SODIUM 5000 UNITS: 5000 INJECTION, SOLUTION INTRAVENOUS; SUBCUTANEOUS at 13:53

## 2018-05-11 RX ADMIN — CLOPIDOGREL 75 MG: 75 TABLET, FILM COATED ORAL at 13:52

## 2018-05-11 RX ADMIN — CEFEPIME HYDROCHLORIDE 1000 MG: 1 INJECTION, SOLUTION INTRAVENOUS at 13:43

## 2018-05-11 RX ADMIN — CEFEPIME HYDROCHLORIDE 1000 MG: 1 INJECTION, SOLUTION INTRAVENOUS at 22:47

## 2018-05-11 RX ADMIN — NOREPINEPHRINE BITARTRATE 10 MCG/MIN: 1 INJECTION INTRAVENOUS at 09:35

## 2018-05-11 RX ADMIN — SODIUM CHLORIDE 75 ML/HR: 0.9 INJECTION, SOLUTION INTRAVENOUS at 20:37

## 2018-05-11 RX ADMIN — DOCUSATE SODIUM 100 MG: 100 CAPSULE, LIQUID FILLED ORAL at 19:45

## 2018-05-11 NOTE — SPEECH THERAPY NOTE
Speech Language/Pathology  Speech-Language Pathology Bedside Swallow Evaluation        Patient Name: Alix Knight    Today's Date: 5/11/2018     Problem List  Patient Active Problem List   Diagnosis    Rapid atrial fibrillation ([facility] )    Mitral regurgitation    Cognitive decline    MARQUISE (acute kidney injury) ([facility] )    Dementia with behavioral disturbance    PAT (paroxysmal atrial tachycardia) ([facility] )    Coronary artery disease involving native coronary artery of native heart    Hypertension    Sepsis ([facility] )    Urinary retention       Past Medical History  Past Medical History:   Diagnosis Date    Cardiac disease     COPD (chronic obstructive pulmonary disease) ([facility] )     Coronary artery disease     Hyperlipidemia     Hypertension        Past Surgical History  No past surgical history on file  Summary    Pt presents with mild oral dysphagia, characterized by mildly prolonged mastication, bolus formation and transfer, with limited consistencies presented due to premorbid conservative diet  Pharyngeal swallow appears WFL  There were no s/s of aspiration  Given hx of chronic dysphagia, pt would benefit from 1-2 ST sessions to ensure diet toleration  Recommendations:   Diet: mechanically altered/level 2 diet and thin liquids   Meds: whole with liquid   Aspiration precautions and compensatory swallowing strategies: upright posture, only feed when fully alert, slow rate of feeding and small bites/sips       Current Medical Status  Copied from ED notes:  Alix Knight is a 80 y o  male who is a resident of [facility]  He has a known history of dementia  Therefore, information is obtained from prior records  Patient was admitted to [facility] after being treated for a [facility] infection [facility] with Macrobid with no improvement  While at [facility] the patient became hypotensive and less responsive  He was started on pressors to maintain blood pressure    He was therefore transferred to [facility] intensive care unit for evaluation and treatment  Upon arrival patient does not answer questions  He is easily annoyed and yells to be left alone, that he just wants to get warm  He does not follow commands  He will require greater than 2 midnight stay    *PA notes suggest pt has chronic dysphagia, and was on a mech soft diet at the [facility]  Order received for swallow eval  Called [facility], pt[facility], to verify diet, however, no one answered the phone  Past medical history:   Please see H&P for details    Special Studies:  WBC-elevated  CXR-NAD    Social/Education/Vocational Hx:  Pt lives in SNF/ECF    Swallow Information   Current Risks for Dysphagia & Aspiration: known history of dysphagia and AMS/dementia  Current Symptoms/Concerns: Pt has been NPO since admission, but RN reports no difficulty taking pills with water  Current Diet: NPO   Baseline Diet: mechanically altered/level 2 diet and thin liquids    Baseline Assessment   Behavior/Cognition: alert and confused, calling out loudly  Speech/Language Status: able to follow commands inconsistently, limited verbal output and voice is rough/hoarse  Patient Positioning: upright in bed and pt kept leaning to the R, despite multiple attempts to reposition     Swallow Mechanism Exam   Unable to complete oral mech exam, due to severe dementia/inability to follow commands  Oral mech appears grossly intact, some missing teeth, pt is on room air; sats remained in mid 90s for eval      Consistencies Assessed and Performance   Consistencies Administered: thin liquids, puree and mechanical soft solids    Oral Stage: Adequate bolus retrieval and draw from straw, mildly prolonged mastication, bolus formation and transfer; no pocketing or significant oral residue  Pharyngeal Stage: Swallows appear prompt, with no s/s of aspiration  Hyolaryngeal elevation judged WFL         Esophageal Concerns: none reported      Results Reviewed with: patient and RN   Dysphagia Goals: Pt will tolerate dysphagia 2 diet and thin liquids without s/s of aspiration across all meals and snacks    Discharge recommendation: likely no follow up needed for swallowing    Speech Therapy Prognosis   Prognosis: fair    Prognosis Considerations: medical status, prior medical history, cognitive status, progressive disease process and therapeutic potential

## 2018-05-11 NOTE — ASSESSMENT & PLAN NOTE
750 cc +of urine retention, likely secondary to BPH  [de-identified] Cam[place]onian Baird catheter placed without event  Plan:  Continue Baird catheter to gravity drainage  Monitor I/O  Await cultures and tailor antibiotics appropriately  Recommend outpatient trial of void, given age, comorbidities, current status of illness, and large volume retention

## 2018-05-11 NOTE — DISCHARGE INSTRUCTIONS
PER UROLOGY  Cath Care instructions---Maintain catheter to straight drainage  May use leg bag and shower  May flush daily prn using Yasmeen syringe and 120 ml NSS  May use more saline ad nara to prevent/treat cath obstruction  Urinary Catheter can remain in place for up to 4 weeks at a time  Change thereafter using same catheter size and type   Catheter placed at [facility] on  May 11, 2018

## 2018-05-11 NOTE — TELEPHONE ENCOUNTER
Mr Epi Roman is an 55-year-old man seen in consultation at [facility] regarding urinary retention  Patient reach out to patient caregiver with hospital follow-up  appointment date and time in 4 weeks

## 2018-05-11 NOTE — PROGRESS NOTES
RN attempted to place a cantrell catheter due to patient's continued urinary retention  16 Fr  Coude attempted as well as a 12 Fr cantrell catheter  RN was unable to pass either catheter, possible stricture in the urethra  Urology has already been consulted  Urology cart made available for when the Dr  Is into see the patient

## 2018-05-11 NOTE — H&P
History & Physical Exam - Critical Care   Burgess Ferguson 80 y o  male MRN: [id number]  Unit/Bed#: ICU 10 Encounter: [id number]      Assessment/Plan:  1  Sepsis likely source is urinary tract infection  · Continue IV cefepime  Baird catheter  Fluid resuscitation  Check lactic acid  Send blood and urine cultures  2  Urinary retention, likely secondary to BPH  · [employer] personnel were unable to place a Baird catheter secondary to obstruction  Consult Urology for Baird placement  Hold Flomax for now secondary to hypotension related to 1   3  Leukocytosis secondary to 1   · Monitor CBC, continue antibiotics  4  Acute kidney injury  · Monitor renal indices, continue IV fluids  5  Sick sinus syndrome status post pacemaker in remote past   6  Coronary artery disease continue Plavix and aspirin hold beta blockade secondary to hypotension  7  Hypothyroid  Continue levothyroxine  8  COPD without exacerbation  Updraft nebulizer treatments  9  Dysphagia  Patient was on a mechanical soft diet will obtain swallow eval   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      Critical Care Time:   Documented critical care time excludes any procedures documented elsewhere  It also excludes any family updates    _____________________________________________________________________      HPI:    Burgess Ferugson is a 80 y o  male who is a resident of [facility]  He has a known history of dementia  Therefore, information is obtained from prior records  Patient was admitted to [facility] after being [facility] for a [facility] with [facility]bid with no improvement  While at [facility] the patient became hypotensive and less responsive  He was started on pressors to maintain blood pressure  He was therefore transferred [facility]  intensive care [facility] for evaluation and treatment  Upon arrival patient does not answer questions    He is easily annoyed and yells to be left alone, that he just wants to get warm  He does not follow commands  He will require greater than 2 midnight stay    Review of Systems:  Unable to obtain    Historical Information   Past Medical History:   Diagnosis Date    Cardiac disease     [facility]     Coronary artery disease     Hyperlipidemia     Hypertension      No past surgical history on file  Social History   History   Alcohol Use No     History   Drug Use No     History   Smoking Status    Unknown If Ever Smoked   Smokeless Tobacco    Not on file       Family History:   No family history on file  Medications:  Pertinent medications were reviewed    Current Facility-Administered Medications:  acetaminophen 650 mg Oral Q6H PRN Pattricia Stall, CRNP   atorvastatin 20 mg Oral Daily With Brewton Kim, CRNP   cefepime 1,000 mg Intravenous Q12H Pattricia Stall, CRNP   clopidogrel 75 mg Oral Every Other Day Pattricia Stall, CRNP   docusate sodium 100 mg Oral BID Pattricia Stall, CRNP   enoxaparin 40 mg Subcutaneous Daily Pattricia Stall, CRNP   escitalopram 10 mg Oral Daily Pattricia Stall, CRNP   lidocaine 1 patch Transdermal Daily Pattricia Stall, CRNP   pantoprazole 40 mg Oral Daily PRN Pattricia Stall, CRNP   QUEtiapine 25 mg Oral BID Pattricia Stall, CRNP         Allergies   Allergen Reactions    Penicillins          Vitals:   BP (!) 87/54   Pulse 76   Temp 97 5 °F (36 4 °C) (Temporal)   Resp 13   Ht 5' 4" (1 626 m)   Wt 60 8 kg (134 lb 0 6 oz)   SpO2 96%   BMI 23 01 kg/m²   Body mass index is 23 01 kg/m²    SpO2: 96 %,   SpO2 Activity: At Rest,   O2 Device: None (Room air)    No intake or output data in the 24 hours ending 05/11/18 1157  Invasive Devices     Peripheral Intravenous Line            Peripheral IV 05/11/18 Left Wrist less than 1 day    Peripheral IV 05/11/18 Right Wrist less than 1 day                Physical Exam:  Gen:  Dementia  HEENT:  Atraumatic normocephalic pupils equal round reactive to light extraocular muscles intact sclerae anicteric oral mucosa is pink and moist  Neck:  Supple no JVD no lymphadenopathy  Chest:  Clear to auscultation respirations even and nonlabored  Cor:  Regular rate and rhythm no murmurs rubs or gallops appreciated  Abd:  Soft nontender with hypoactive bowel sounds  Ext:  No clubbing cyanosis or edema  Neuro:  Oriented x1 moves all extremities  Skin:  Warm dry and intact      Diagnostic Data:  Lab: I have personally reviewed pertinent lab results  ,   CBC:       CMP: No results found for: NA, K, CL, CO2, ANIONGAP, BUN, CREATININE, GLUCOSE, CALCIUM, AST, ALT, ALKPHOS, PROT, ALBUMIN, BILITOT, EGFR,   PT/INR: No results found for: PT, INR,   Magnesium: No results found for: MAG,  Phosphorous: No results found for: PHOS    ABG: No results found for: PHART, RDC2KXE, PO2ART, XHV8NLZ, M4ZMVRZU, BEART, SOURCE,     Microbiology:  Urine and blood cultures pending    Imaging: I have personally reviewed the pertinent imaging studies on the PACS system  Chest x-ray demonstrates no active pulmonary disease  There are sternal wires noted and a permanent pacemaker in place    Cardiac/EKG/telemetry/Echo:     Normal sinus rhythm      VTE Prophylaxis: Sequential compression device (Venodyne)  and Heparin    Code Status: Level 3 - DNAR and DNI    MICHAEL Fry    Portions of the record may have been created with voice recognition software  Occasional wrong word or "sound a like" substitutions may have occurred due to the inherent limitations of voice recognition software  Read the chart carefully and recognize, using context, where substitutions have occurred

## 2018-05-11 NOTE — ASSESSMENT & PLAN NOTE
He flank creatinine appears to be around 1  Creatinine on admission 2 82  Likely post renal secondary to BPH in urinary retention with outlet obstruction  Plan:  Continue decompression with Baird catheter  Trend creatinine

## 2018-05-11 NOTE — PROCEDURES
Central Line Insertion  Date/Time: 5/11/2018 7:02 PM  Performed by: Aishwarya Hodges by: Luis Alfredo Lawrence     Patient location:  Bedside  Other Assisting Provider: No    Consent:     Consent obtained:  Verbal    Consent given by:  Guardian    Risks discussed:  Arterial puncture, incorrect placement, nerve damage, pneumothorax, infection and bleeding    Alternatives discussed:  No treatment  Universal protocol:     Procedure explained and questions answered to patient or proxy's satisfaction: yes      Relevant documents present and verified: yes      Test results available and properly labeled: yes      Imaging studies available: yes      Required blood products, implants, devices, and special equipment available: yes      Site/side marked: yes      Immediately prior to procedure, a time out was called: yes      Patient identity confirmed:  Arm band and hospital-assigned identification number  Pre-procedure details:     Hand hygiene: Hand hygiene performed prior to insertion      Sterile barrier technique: All elements of maximal sterile technique followed      Skin preparation:  ChloraPrep    Skin preparation agent: Skin preparation agent completely dried prior to procedure    Indications:     Central line indications: medications requiring central line and no peripheral vascular access    Anesthesia (see MAR for exact dosages):      Anesthesia method:  Local infiltration    Local anesthetic:  Lidocaine 1% w/o epi  Procedure details:     Location:  Right internal jugular    Vessel type: vein      Approach: percutaneous technique used      Patient position:  Flat    Catheter type:  Triple lumen 16cm    Catheter size:  7 Fr    Landmarks identified: yes      Ultrasound guidance: yes      Sterile ultrasound techniques: Sterile gel and sterile probe covers were used      Number of attempts:  1    Successful placement: yes      Vessel of catheter tip end:  Cavo atrial junction  Post-procedure details: Post-procedure:  Line sutured and dressing applied    Assessment:  Blood return through all ports, no pneumothorax on x-ray and placement verified by x-ray    Patient tolerance of procedure:   Tolerated well, no immediate complications

## 2018-05-11 NOTE — CONSULTS
Consult - Urology   Albino Bolus 1934, 80 y o  male MRN: [id number]    Unit/Bed#: ICU 10 Encounter: [id number]    Urinary retention   Assessment & Plan    750 cc +of urine retention, likely secondary to BPH  [de-identified] German Baird catheter placed without event  Plan:  Continue Baird catheter to gravity drainage  Monitor I/O  Await cultures and tailor antibiotics appropriately  Recommend outpatient trial of void, given age, comorbidities, current status of illness, and large volume retention  MARQUISE (acute kidney injury) ([facility] )   Assessment & Plan    He flank creatinine appears to be around 1  Creatinine on admission 2 82  Likely post renal secondary to BPH in urinary retention with outlet obstruction  Plan:  Continue decompression with Baird catheter  Trend creatinine  Subjective/Objective     Subjective:   Patient is 66-year-old male, currently admitted to the critical care unit for severe sepsis suspected secondary to UTI  He has a history of BPH  He was transferred to the [address] after original care at [facility]   Baird catheterization was attempted at [facility] without success  Patient has a history of advanced dementia  History is obtained largely from the chart  Currently he is receiving IV cefepime for is UTI  He also has acute kidney injury  His baseline creatinine appears to be around 1 with admitting creatinine of 2 82  It appears patient resides at an assisted living facility at which he was getting Macrobid for UTI  He was subsequently admitted to [facility] at which point he had a change in mental status and episode of hypotension  He was started on pressor support for his blood pressure and was subsequently transferred to the intensive care unit here  Per the nursing staff in the ICU here, several attempts were made at [facility] for placement of Baird catheter    Attempt was subsequently made here with a 16 [place] coude catheter  Reportedly there was some stricture felt approximately 2- 3 cm into the urethra  We will have access to prior records and minimal historical information is available on the patient  Upon arrival bladder scan was over 400 cc  Patient has been voiding small amounts incontinence since then  No significant void on his own  Review of Systems   Unable to perform ROS: Dementia       Objective:  Vitals: Blood pressure 120/53, pulse 94, temperature 97 5 °F (36 4 °C), temperature source Temporal, resp  rate (!) 31, height 5' 4" (1 626 m), weight 60 8 kg (134 lb 0 6 oz), SpO2 94 %  ,Body mass index is 23 01 kg/m²  Intake/Output Summary (Last 24 hours) at 05/11/18 1516  Last data filed at 05/11/18 1435   Gross per 24 hour   Intake           230 67 ml   Output                0 ml   Net           230 67 ml       Invasive Devices     Peripheral Intravenous Line            Peripheral IV 05/11/18 Left Wrist less than 1 day    Peripheral IV 05/11/18 Right Wrist less than 1 day                Physical Exam   Constitutional:   On exam patient is a elderly 60-year-old male who appears stated age, he slouched in bed, occasionally shouting in yelling  He is a questions  He is oriented only to self  He appears comfortable  He denies any pain and answers yes/no to questions  He complains of some discomfort in his abdomen and his saying "oh my belly, my belly"  Slightly disheveled  Smells slightly of urine  Cardiovascular: Normal rate, regular rhythm and normal heart sounds  Pulmonary/Chest:   tachypnea noted with good air flow bilaterally  No appreciated wheezes or crackles  Abdominal:   Abdomen soft without significant tenderness  Some suprapubic fullness noted  A little bit of discomfort on palpation of that area  Normoactive bowel sounds appreciated  No palpable hernias or visible surgical scars  Slightly rotund  Not significant tympany  Genitourinary: Penis normal  No penile tenderness  Genitourinary Comments: No external penile lesions are noted  No significant scrotal swelling or edema  No redness  No tenderness on exam    Musculoskeletal: He exhibits no edema  Skin: Skin is warm and dry  Psychiatric: He has a normal mood and affect  His behavior is normal  Judgment and thought content normal    Nursing note and vitals reviewed  Procedure  Patient is placed in the supine position  Areas prepped and draped in the normal sterile fashion  Lubricant introduced into the urethra  [de-identified] Y[place] catheter inserted into the urethra  Approximately 3 cm in resistance with a slight stricture is noted  With steady gentle pressure, stricture is passed  Baird catheter is advanced until clear yellow urine is returned  Approximately 750 cc of cloudy yellow urine without hematuria or clots drains into the Baird catheter bag  Balloon inflated with 10 cc of water  Baird catheter is secured  Patient tolerated the procedure well, despite dementia and some outbursts during placement  History:    Past Medical History:   Diagnosis Date    Cardiac disease     [facility]) [facility]     Coronary artery disease     Hyperlipidemia     Hypertension      No past surgical history on file  No family history on file    Social History     Social History    Marital status:      Spouse name: N/A    Number of children: N/A    Years of education: N/A     Social History Main Topics    Smoking status: Unknown If Ever Smoked    Smokeless tobacco: Not on file    Alcohol use No    Drug use: No    Sexual activity: Not on file     Other Topics Concern    Not on file     Social History Narrative    No narrative on file       Labs:  Recent Labs      05/11/18   1323   WBC  24 38*     Recent Labs      05/11/18   1323   HGB  8 7*       Recent Labs      05/11/18   1323   CREATININE  2 82*     Lori Clarke PA-C  Date: 5/11/2018 Time: 3:16 PM

## 2018-05-11 NOTE — DISCHARGE INSTR - DIET
Puree w/ honey thick liquids s/p VBS 5/21/18  Swallow x 2  Cue to cough periodocally  Keep head at neutral/avoid neck extension  VBS summary:  Summary:  Mild/mod oral dysphagia, mod pharyngeal dysphagia  Prolonged mastication, mildly weak transfer, mild posterior lingual residue  Delayed transfer of solid food posteriorly  Moderate pharyngeal stage  Reduced laryngeal excursion and pharyngeal constriction, Pharyngeal retention (valleculae >pyriform), mild ppm coating, aspiration w/ nectar by cup sip (inconsistent  Coughed only on larger aspiration episode

## 2018-05-12 LAB
ABO GROUP BLD: NORMAL
ANION GAP SERPL CALCULATED.3IONS-SCNC: 10 MMOL/L (ref 4–13)
BACTERIA UR CULT: NORMAL
BASOPHILS # BLD MANUAL: 0 THOUSAND/UL (ref 0–0.1)
BASOPHILS NFR MAR MANUAL: 0 % (ref 0–1)
BLD GP AB SCN SERPL QL: NEGATIVE
BUN SERPL-MCNC: 60 MG/DL (ref 5–25)
CALCIUM SERPL-MCNC: 8.2 MG/DL (ref 8.3–10.1)
CHLORIDE SERPL-SCNC: 109 MMOL/L (ref 100–108)
CO2 SERPL-SCNC: 24 MMOL/L (ref 21–32)
CREAT SERPL-MCNC: 1.88 MG/DL (ref 0.6–1.3)
EOSINOPHIL # BLD MANUAL: 0 THOUSAND/UL (ref 0–0.4)
EOSINOPHIL NFR BLD MANUAL: 0 % (ref 0–6)
ERYTHROCYTE [DISTWIDTH] IN BLOOD BY AUTOMATED COUNT: 16.1 % (ref 11.6–15.1)
FOLATE SERPL-MCNC: 10.4 NG/ML (ref 3.1–17.5)
GFR SERPL CREATININE-BSD FRML MDRD: 32 ML/MIN/1.73SQ M
GLUCOSE SERPL-MCNC: 122 MG/DL (ref 65–140)
HCT VFR BLD AUTO: 20.1 % (ref 36.5–49.3)
HCT VFR BLD AUTO: 21.1 % (ref 36.5–49.3)
HEMOCCULT STL QL: POSITIVE
HGB BLD-MCNC: 6.5 G/DL (ref 12–17)
HGB BLD-MCNC: 7 G/DL (ref 12–17)
LG PLATELETS BLD QL SMEAR: PRESENT
LYMPHOCYTES # BLD AUTO: 0 % (ref 14–44)
LYMPHOCYTES # BLD AUTO: 0 THOUSAND/UL (ref 0.6–4.47)
MACROCYTES BLD QL AUTO: PRESENT
MCH RBC QN AUTO: 35.2 PG (ref 26.8–34.3)
MCHC RBC AUTO-ENTMCNC: 33.2 G/DL (ref 31.4–37.4)
MCV RBC AUTO: 106 FL (ref 82–98)
MONOCYTES # BLD AUTO: 0.91 THOUSAND/UL (ref 0–1.22)
MONOCYTES NFR BLD: 7 % (ref 4–12)
NEUTROPHILS # BLD MANUAL: 12.07 THOUSAND/UL (ref 1.85–7.62)
NEUTS BAND NFR BLD MANUAL: 13 % (ref 0–8)
NEUTS SEG NFR BLD AUTO: 80 % (ref 43–75)
NRBC BLD AUTO-RTO: 0 /100 WBCS
OVALOCYTES BLD QL SMEAR: PRESENT
PLATELET # BLD AUTO: 173 THOUSANDS/UL (ref 149–390)
PLATELET BLD QL SMEAR: ADEQUATE
PMV BLD AUTO: 9.1 FL (ref 8.9–12.7)
POTASSIUM SERPL-SCNC: 3.6 MMOL/L (ref 3.5–5.3)
RBC # BLD AUTO: 1.99 MILLION/UL (ref 3.88–5.62)
RH BLD: NEGATIVE
SODIUM SERPL-SCNC: 143 MMOL/L (ref 136–145)
SPECIMEN EXPIRATION DATE: NORMAL
TOTAL CELLS COUNTED SPEC: 100
VIT B12 SERPL-MCNC: 709 PG/ML (ref 100–900)
WBC # BLD AUTO: 12.98 THOUSAND/UL (ref 4.31–10.16)

## 2018-05-12 PROCEDURE — 82607 VITAMIN B-12: CPT | Performed by: INTERNAL MEDICINE

## 2018-05-12 PROCEDURE — P9016 RBC LEUKOCYTES REDUCED: HCPCS

## 2018-05-12 PROCEDURE — 86900 BLOOD TYPING SEROLOGIC ABO: CPT | Performed by: NURSE PRACTITIONER

## 2018-05-12 PROCEDURE — 86901 BLOOD TYPING SEROLOGIC RH(D): CPT | Performed by: NURSE PRACTITIONER

## 2018-05-12 PROCEDURE — 83540 ASSAY OF IRON: CPT | Performed by: FAMILY MEDICINE

## 2018-05-12 PROCEDURE — 80048 BASIC METABOLIC PNL TOTAL CA: CPT | Performed by: NURSE PRACTITIONER

## 2018-05-12 PROCEDURE — 85027 COMPLETE CBC AUTOMATED: CPT | Performed by: NURSE PRACTITIONER

## 2018-05-12 PROCEDURE — C9113 INJ PANTOPRAZOLE SODIUM, VIA: HCPCS | Performed by: NURSE PRACTITIONER

## 2018-05-12 PROCEDURE — 82272 OCCULT BLD FECES 1-3 TESTS: CPT | Performed by: NURSE PRACTITIONER

## 2018-05-12 PROCEDURE — 82728 ASSAY OF FERRITIN: CPT | Performed by: FAMILY MEDICINE

## 2018-05-12 PROCEDURE — P9021 RED BLOOD CELLS UNIT: HCPCS

## 2018-05-12 PROCEDURE — 83918 ORGANIC ACIDS TOTAL QUANT: CPT | Performed by: INTERNAL MEDICINE

## 2018-05-12 PROCEDURE — 83550 IRON BINDING TEST: CPT | Performed by: FAMILY MEDICINE

## 2018-05-12 PROCEDURE — 86850 RBC ANTIBODY SCREEN: CPT | Performed by: NURSE PRACTITIONER

## 2018-05-12 PROCEDURE — 99232 SBSQ HOSP IP/OBS MODERATE 35: CPT | Performed by: UROLOGY

## 2018-05-12 PROCEDURE — 99233 SBSQ HOSP IP/OBS HIGH 50: CPT | Performed by: NURSE PRACTITIONER

## 2018-05-12 PROCEDURE — 86920 COMPATIBILITY TEST SPIN: CPT

## 2018-05-12 PROCEDURE — 85018 HEMOGLOBIN: CPT | Performed by: INTERNAL MEDICINE

## 2018-05-12 PROCEDURE — 82746 ASSAY OF FOLIC ACID SERUM: CPT | Performed by: INTERNAL MEDICINE

## 2018-05-12 PROCEDURE — 30233N1 TRANSFUSION OF NONAUTOLOGOUS RED BLOOD CELLS INTO PERIPHERAL VEIN, PERCUTANEOUS APPROACH: ICD-10-PCS | Performed by: INTERNAL MEDICINE

## 2018-05-12 PROCEDURE — 85014 HEMATOCRIT: CPT | Performed by: INTERNAL MEDICINE

## 2018-05-12 PROCEDURE — 85007 BL SMEAR W/DIFF WBC COUNT: CPT | Performed by: NURSE PRACTITIONER

## 2018-05-12 RX ORDER — LEVOTHYROXINE SODIUM 0.07 MG/1
75 TABLET ORAL
Status: DISCONTINUED | OUTPATIENT
Start: 2018-05-13 | End: 2018-05-23 | Stop reason: HOSPADM

## 2018-05-12 RX ORDER — PANTOPRAZOLE SODIUM 40 MG/1
40 INJECTION, POWDER, FOR SOLUTION INTRAVENOUS EVERY 12 HOURS SCHEDULED
Status: DISCONTINUED | OUTPATIENT
Start: 2018-05-12 | End: 2018-05-23 | Stop reason: HOSPADM

## 2018-05-12 RX ADMIN — CEFEPIME HYDROCHLORIDE 1000 MG: 1 INJECTION, SOLUTION INTRAVENOUS at 12:26

## 2018-05-12 RX ADMIN — QUETIAPINE FUMARATE 25 MG: 25 TABLET ORAL at 17:59

## 2018-05-12 RX ADMIN — QUETIAPINE FUMARATE 25 MG: 25 TABLET ORAL at 09:40

## 2018-05-12 RX ADMIN — HEPARIN SODIUM 5000 UNITS: 5000 INJECTION, SOLUTION INTRAVENOUS; SUBCUTANEOUS at 05:27

## 2018-05-12 RX ADMIN — ESCITALOPRAM OXALATE 10 MG: 10 TABLET ORAL at 09:40

## 2018-05-12 RX ADMIN — HEPARIN SODIUM 5000 UNITS: 5000 INJECTION, SOLUTION INTRAVENOUS; SUBCUTANEOUS at 14:29

## 2018-05-12 RX ADMIN — LEVOTHYROXINE SODIUM 50 MCG: 50 TABLET ORAL at 05:26

## 2018-05-12 RX ADMIN — ATORVASTATIN CALCIUM 20 MG: 20 TABLET, FILM COATED ORAL at 16:41

## 2018-05-12 RX ADMIN — PANTOPRAZOLE SODIUM 40 MG: 40 INJECTION, POWDER, FOR SOLUTION INTRAVENOUS at 20:29

## 2018-05-12 RX ADMIN — DOCUSATE SODIUM 100 MG: 100 CAPSULE, LIQUID FILLED ORAL at 17:59

## 2018-05-12 RX ADMIN — DOCUSATE SODIUM 100 MG: 100 CAPSULE, LIQUID FILLED ORAL at 09:40

## 2018-05-12 RX ADMIN — LIDOCAINE 1 PATCH: 50 PATCH CUTANEOUS at 09:39

## 2018-05-12 NOTE — MALNUTRITION/BMI
This medical record reflects one or more clinical indicators suggestive of malnutrition and/or morbid obesity  Malnutrition Findings:   Malnutrition type: Chronic illness  Degree of Malnutrition: Malnutrition of mild degree  Malnutrition Characteristics: Fat loss, Muscle loss, Weight loss (Mild depletion of body fat mass, orbitals are somewhat hollow  Slight depression of temple indicating loss of muscle mass     19% wt change x 4 months) Treating with supplements  BMI Findings: Body mass index is 22 82 kg/m²  See Nutrition note dated 05/12/2018 for additional details  Completed nutrition assessment is viewable in the nutrition documentation

## 2018-05-12 NOTE — PROGRESS NOTES
Progress Note - Urology Progress  Huey Whittaker 80 y o  male MRN: [id number]  Unit/Bed#: ICU 10 Encounter: [id number]    Assessment:  Urosepsis-improving  He is now off pressors  Urinary retention-stable with Baird catheter  Plan:  Continue present therapy  Urology will sign off  Would maintain Baird catheter and plan outpatient voiding trial     Subjective/Objective       Subjective:  No complaints  He reports he is feeling better  Objective:    Blood pressure 138/79, pulse (!) 114, temperature 97 5 °F (36 4 °C), temperature source Temporal, resp  rate 21, height 5' 4" (1 626 m), weight 60 3 kg (132 lb 15 oz), SpO2 93 %  ,Body mass index is 22 82 kg/m²  Intake/Output Summary (Last 24 hours) at 05/12/18 1202  Last data filed at 05/12/18 1000   Gross per 24 hour   Intake          3538 19 ml   Output             2690 ml   Net           848 19 ml       Invasive Devices     Central Venous Catheter Line            CVC Central Lines 05/11/18 Triple 16cm less than 1 day          Drain            Urethral Catheter Latex 16 Fr  less than 1 day                Physical Exam: General appearance: alert and oriented, in no acute distress  Head: Normocephalic, without obvious abnormality, atraumatic  Lungs: Normal respiratory effort  Abdomen: soft, non-tender; bowel sounds normal; no masses,  no organomegaly  Male genitalia: normal  Baird in place  Urine is clear  Baird is draining well  Lab, Imaging and other studies:  I have personally reviewed pertinent lab results    , CBC:   Lab Results   Component Value Date    WBC 12 98 (H) 05/12/2018    HGB 7 0 (L) 05/12/2018    HCT 21 1 (L) 05/12/2018     (H) 05/12/2018     05/12/2018    MCH 35 2 (H) 05/12/2018    MCHC 33 2 05/12/2018    RDW 16 1 (H) 05/12/2018    MPV 9 1 05/12/2018    NRBC 0 05/12/2018   , CMP:   Lab Results   Component Value Date     05/12/2018    K 3 6 05/12/2018     (H) 05/12/2018    CO2 24 05/12/2018    ANIONGAP 10 05/12/2018    BUN 60 (H) 05/12/2018    CREATININE 1 88 (H) 05/12/2018    GLUCOSE 122 05/12/2018    CALCIUM 8 2 (L) 05/12/2018    AST 34 05/11/2018    ALT 19 05/11/2018    ALKPHOS 69 05/11/2018    PROT 7 3 05/11/2018    BILITOT 0 49 05/11/2018    EGFR 32 05/12/2018

## 2018-05-12 NOTE — PROGRESS NOTES
Progress Note - Critical Care   Fadumo Olvera 80 y o  male MRN: [id number]  Unit/Bed#: ICU 10 Encounter: [id number]    Assessment/Plan:  1  Severe sepsis with shock secondary to urinary tract infection  · Shock component has resolved  Patient has been weaned off of Levophed  Blood pressure remains stable  Continue close hemodynamic monitoring  · Remains afebrile  Blood and urine cultures are pending  Review of record shows prior urine culture was positive for E coli  Continue cefepime until culture data is available  · Monitor temps and WBC count  2  Acute kidney injury, multifactorial secondary to sepsis and obstructive uropathy  · Creatinine improved 2 82>>1  88  Urine output improved  Continue to trend renal indices and monitor intake and output  3  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  · Baird was placed by Urology  Keep in place for bladder decompression  4  Sick sinus syndrome status post previous pacemaker placement  5  CAD status post CABG  · Continue dual antiplatelet therapy per outpatient regimen  6  COPD without acute exacerbation  · Nebulized bronchodilators p r n   7  Hypothyroid  · Continue Synthroid per home dosing  8  Organic dementia  · Continue Lexapro and Seroquel per home regimen  9  Dysphagia  · Patient evaluated by speech and cleared for mechanical soft level 2 diet with thin liquids  Will advance diet today  · Aspiration precautions  10  Anemia suspect related to critical illness  · Trend Hgb and transfuse for <7  No obvious signs of bleeding  Check stool for occult blood  _____________________________________________________________________    HPI/24hr events:   Afebrile  Blood pressure improved  No acute events overnight      Medications:    Current Facility-Administered Medications:  acetaminophen 650 mg Oral Q6H PRN MICHAEL Hawley    atorvastatin 20 mg Oral Daily With MICHAEL Moore    cefepime 1,000 mg Intravenous Q12H [place] , CRNP Last Rate: Stopped (05/11/18 2302)   clopidogrel 75 mg Oral Every Other Day [place] , CRNP    docusate sodium 100 mg Oral BID [place] , CRNP    escitalopram 10 mg Oral Daily [place] , CRNP    heparin (porcine) 5,000 Units Subcutaneous [facility] [place] , CRNP    ipratropium 0 5 mg Nebulization Q8H PRN Pantera Hardy MD    levalbuterol 1 25 mg Nebulization Q8H PRN [place] , CRNP    levothyroxine 50 mcg Oral Early Morning [place] , CRNP    lidocaine 1 patch Transdermal Daily [place] , CRNP    norepinephrine 1-30 mcg/min Intravenous Titrated [place] , CRNP Last Rate: Stopped (05/12/18 0217)   pantoprazole 40 mg Oral Daily PRN [place] , CRNP    QUEtiapine 25 mg Oral BID [place] , CRNP    sodium chloride 75 mL/hr Intravenous Continuous Angelo Epp Sparyan, MICHAEL Last Rate: 75 mL/hr (05/11/18 2037)         norepinephrine 1-30 mcg/min Last Rate: Stopped (05/12/18 0217)   sodium chloride 75 mL/hr Last Rate: 75 mL/hr (05/11/18 2037)         Physical exam:  Vitals: Body mass index is 22 82 kg/m²  Blood pressure 108/62, pulse 90, temperature 98 3 °F (36 8 °C), temperature source Temporal, resp   rate (!) 33, height 5' 4" (1 626 m), weight 60 3 kg (132 lb 15 oz), SpO2 91 % ,  Temp  Min: 97 4 °F (36 3 °C)  Max: 98 3 °F (36 8 °C)  IBW: 59 2 kg    SpO2: 91 %  SpO2 Activity: At Rest  O2 Device: None (Room air)      Intake/Output Summary (Last 24 hours) at 05/12/18 0521  Last data filed at 05/12/18 0400   Gross per 24 hour   Intake          3088 19 ml   Output             2240 ml   Net           848 19 ml       Invasive/non-invasive ventilation settings:   Respiratory    Lab Data (Last 4 hours)    None         O2/Vent Data (Last 4 hours)    None              Invasive Devices     Central Venous Catheter Line            CVC Central Lines 05/11/18 Triple 16cm less than 1 day          Peripheral Intravenous Line            Peripheral IV 05/11/18 Left Wrist 1 day    Peripheral IV 05/11/18 Right Wrist 1 day          Drain            Urethral Catheter Latex 16 Fr  less than 1 day                  Physical Exam:  Gen:  Awake, alert, no acute distress  HEENT:  Atraumatic, normocephalic, extraocular movements intact, pupils 3 mm equal and reactive, oropharynx clear  Neck:  Supple, trachea midline, no JVD, no lymphadenopathy  Chest:  Clear to auscultation bilaterally, slightly diminished in bilateral bases  Cor:  Single S1/S2, no murmurs, rubs, gallops, regular rate and rhythm  Abd:  Soft, nontender, nondistended, bowel sounds normoactive  Ext:  No edema, no clubbing or cyanosis  Neuro:  Oriented to person, confused as to place and time, follows commands, moves all extremities, no focal deficits  Skin:  Warm, dry      Diagnostic Data:  Lab: I have personally reviewed pertinent lab results  CBC:     Results from last 7 days  Lab Units 05/12/18  0349 05/11/18  1323   WBC Thousand/uL 12 98* 24 38*   HEMOGLOBIN g/dL 7 0* 8 7*   HEMATOCRIT % 21 1* 26 6*   PLATELETS Thousands/uL 173 241       CMP:     Results from last 7 days  Lab Units 05/12/18  0349 05/11/18  1323   SODIUM mmol/L 143 143   POTASSIUM mmol/L 3 6 4 7   CHLORIDE mmol/L 109* 105   CO2 mmol/L 24 25   BUN mg/dL 60* 75*   CREATININE mg/dL 1 88* 2 82*   CALCIUM mg/dL 8 2* 8 1*   TOTAL PROTEIN g/dL  --  7 3   BILIRUBIN TOTAL mg/dL  --  0 49   ALK PHOS U/L  --  69   ALT U/L  --  19   AST U/L  --  34   GLUCOSE RANDOM mg/dL 122 113     PT/INR:   No results found for: PT, INR,   Magnesium:     Phosphorous:       Microbiology:  Blood cultures x2 pending  Urine culture pending    Imaging:  No new imaging    Cardiac lab/EKG/telemetry/ECHO:   Sinus rhythm on telemetry    VTE Prophylaxis:  Heparin, SCDs    Code Status: Level 3 - DNAR and DNI    Janith Ferguson Spatzer, CRNP    Portions of the record may have been created with voice recognition software   Occasional wrong word or "sound a like" substitutions may have occurred due to the inherent limitations of voice recognition software  Read the chart carefully and recognize, using context, where substitutions have occurred

## 2018-05-13 PROBLEM — E44.1 MILD PROTEIN-CALORIE MALNUTRITION (HCC): Status: ACTIVE | Noted: 2018-05-13

## 2018-05-13 LAB
ABO GROUP BLD BPU: NORMAL
ABO GROUP BLD BPU: NORMAL
ANION GAP SERPL CALCULATED.3IONS-SCNC: 10 MMOL/L (ref 4–13)
ANISOCYTOSIS BLD QL SMEAR: PRESENT
BASOPHILS # BLD MANUAL: 0 THOUSAND/UL (ref 0–0.1)
BASOPHILS NFR MAR MANUAL: 0 % (ref 0–1)
BPU ID: NORMAL
BPU ID: NORMAL
BUN SERPL-MCNC: 41 MG/DL (ref 5–25)
BURR CELLS BLD QL SMEAR: PRESENT
CALCIUM SERPL-MCNC: 8.4 MG/DL (ref 8.3–10.1)
CHLORIDE SERPL-SCNC: 113 MMOL/L (ref 100–108)
CO2 SERPL-SCNC: 25 MMOL/L (ref 21–32)
CREAT SERPL-MCNC: 1.33 MG/DL (ref 0.6–1.3)
CROSSMATCH: NORMAL
CROSSMATCH: NORMAL
EOSINOPHIL # BLD MANUAL: 0 THOUSAND/UL (ref 0–0.4)
EOSINOPHIL NFR BLD MANUAL: 0 % (ref 0–6)
ERYTHROCYTE [DISTWIDTH] IN BLOOD BY AUTOMATED COUNT: 19.1 % (ref 11.6–15.1)
GFR SERPL CREATININE-BSD FRML MDRD: 49 ML/MIN/1.73SQ M
GLUCOSE SERPL-MCNC: 133 MG/DL (ref 65–140)
HCT VFR BLD AUTO: 29.5 % (ref 36.5–49.3)
HCT VFR BLD AUTO: 29.9 % (ref 36.5–49.3)
HGB BLD-MCNC: 10 G/DL (ref 12–17)
HGB BLD-MCNC: 10.1 G/DL (ref 12–17)
LYMPHOCYTES # BLD AUTO: 0.3 THOUSAND/UL (ref 0.6–4.47)
LYMPHOCYTES # BLD AUTO: 2 % (ref 14–44)
MCH RBC QN AUTO: 33.8 PG (ref 26.8–34.3)
MCHC RBC AUTO-ENTMCNC: 33.8 G/DL (ref 31.4–37.4)
MCV RBC AUTO: 100 FL (ref 82–98)
MONOCYTES # BLD AUTO: 1.03 THOUSAND/UL (ref 0–1.22)
MONOCYTES NFR BLD: 7 % (ref 4–12)
NEUTROPHILS # BLD MANUAL: 13.43 THOUSAND/UL (ref 1.85–7.62)
NEUTS BAND NFR BLD MANUAL: 11 % (ref 0–8)
NEUTS SEG NFR BLD AUTO: 80 % (ref 43–75)
NRBC BLD AUTO-RTO: 0 /100 WBCS
PLATELET # BLD AUTO: 181 THOUSANDS/UL (ref 149–390)
PLATELET BLD QL SMEAR: ADEQUATE
PMV BLD AUTO: 9.8 FL (ref 8.9–12.7)
POTASSIUM SERPL-SCNC: 3.1 MMOL/L (ref 3.5–5.3)
RBC # BLD AUTO: 2.99 MILLION/UL (ref 3.88–5.62)
SODIUM SERPL-SCNC: 148 MMOL/L (ref 136–145)
TOTAL CELLS COUNTED SPEC: 100
UNIT DISPENSE STATUS: NORMAL
UNIT DISPENSE STATUS: NORMAL
UNIT PRODUCT CODE: NORMAL
UNIT PRODUCT CODE: NORMAL
UNIT RH: NORMAL
UNIT RH: NORMAL
WBC # BLD AUTO: 14.76 THOUSAND/UL (ref 4.31–10.16)

## 2018-05-13 PROCEDURE — 94760 N-INVAS EAR/PLS OXIMETRY 1: CPT

## 2018-05-13 PROCEDURE — 85007 BL SMEAR W/DIFF WBC COUNT: CPT | Performed by: INTERNAL MEDICINE

## 2018-05-13 PROCEDURE — 85014 HEMATOCRIT: CPT | Performed by: NURSE PRACTITIONER

## 2018-05-13 PROCEDURE — C9113 INJ PANTOPRAZOLE SODIUM, VIA: HCPCS | Performed by: NURSE PRACTITIONER

## 2018-05-13 PROCEDURE — 94640 AIRWAY INHALATION TREATMENT: CPT

## 2018-05-13 PROCEDURE — 85027 COMPLETE CBC AUTOMATED: CPT | Performed by: INTERNAL MEDICINE

## 2018-05-13 PROCEDURE — 99232 SBSQ HOSP IP/OBS MODERATE 35: CPT | Performed by: INTERNAL MEDICINE

## 2018-05-13 PROCEDURE — 80048 BASIC METABOLIC PNL TOTAL CA: CPT | Performed by: INTERNAL MEDICINE

## 2018-05-13 PROCEDURE — 85018 HEMOGLOBIN: CPT | Performed by: NURSE PRACTITIONER

## 2018-05-13 RX ORDER — TAMSULOSIN HYDROCHLORIDE 0.4 MG/1
0.4 CAPSULE ORAL
Status: DISCONTINUED | OUTPATIENT
Start: 2018-05-13 | End: 2018-05-23 | Stop reason: HOSPADM

## 2018-05-13 RX ORDER — CEFUROXIME AXETIL 250 MG/1
500 TABLET ORAL EVERY 12 HOURS SCHEDULED
Status: DISCONTINUED | OUTPATIENT
Start: 2018-05-13 | End: 2018-05-17

## 2018-05-13 RX ORDER — POTASSIUM CHLORIDE 20MEQ/15ML
40 LIQUID (ML) ORAL ONCE
Status: COMPLETED | OUTPATIENT
Start: 2018-05-13 | End: 2018-05-13

## 2018-05-13 RX ADMIN — LEVALBUTEROL HYDROCHLORIDE 1.25 MG: 1.25 SOLUTION, CONCENTRATE RESPIRATORY (INHALATION) at 07:33

## 2018-05-13 RX ADMIN — QUETIAPINE FUMARATE 25 MG: 25 TABLET ORAL at 17:32

## 2018-05-13 RX ADMIN — DOCUSATE SODIUM 100 MG: 100 CAPSULE, LIQUID FILLED ORAL at 17:32

## 2018-05-13 RX ADMIN — LEVOTHYROXINE SODIUM 75 MCG: 75 TABLET ORAL at 05:15

## 2018-05-13 RX ADMIN — SODIUM CHLORIDE 75 ML/HR: 0.9 INJECTION, SOLUTION INTRAVENOUS at 06:00

## 2018-05-13 RX ADMIN — CEFEPIME HYDROCHLORIDE 1000 MG: 1 INJECTION, SOLUTION INTRAVENOUS at 10:50

## 2018-05-13 RX ADMIN — IPRATROPIUM BROMIDE 0.5 MG: 0.5 SOLUTION RESPIRATORY (INHALATION) at 07:33

## 2018-05-13 RX ADMIN — CEFUROXIME AXETIL 500 MG: 250 TABLET ORAL at 21:43

## 2018-05-13 RX ADMIN — QUETIAPINE FUMARATE 25 MG: 25 TABLET ORAL at 08:34

## 2018-05-13 RX ADMIN — TAMSULOSIN HYDROCHLORIDE 0.4 MG: 0.4 CAPSULE ORAL at 17:32

## 2018-05-13 RX ADMIN — ACETAMINOPHEN 650 MG: 325 TABLET, FILM COATED ORAL at 12:24

## 2018-05-13 RX ADMIN — POTASSIUM CHLORIDE 40 MEQ: 20 SOLUTION ORAL at 08:33

## 2018-05-13 RX ADMIN — ESCITALOPRAM OXALATE 10 MG: 10 TABLET ORAL at 08:34

## 2018-05-13 RX ADMIN — PANTOPRAZOLE SODIUM 40 MG: 40 INJECTION, POWDER, FOR SOLUTION INTRAVENOUS at 08:34

## 2018-05-13 RX ADMIN — LIDOCAINE 1 PATCH: 50 PATCH CUTANEOUS at 11:16

## 2018-05-13 RX ADMIN — LEVALBUTEROL HYDROCHLORIDE 1.25 MG: 1.25 SOLUTION, CONCENTRATE RESPIRATORY (INHALATION) at 23:30

## 2018-05-13 RX ADMIN — CLOPIDOGREL 75 MG: 75 TABLET, FILM COATED ORAL at 08:34

## 2018-05-13 RX ADMIN — DOCUSATE SODIUM 100 MG: 100 CAPSULE, LIQUID FILLED ORAL at 08:34

## 2018-05-13 RX ADMIN — ATORVASTATIN CALCIUM 20 MG: 20 TABLET, FILM COATED ORAL at 17:32

## 2018-05-13 RX ADMIN — CEFEPIME HYDROCHLORIDE 1000 MG: 1 INJECTION, SOLUTION INTRAVENOUS at 00:24

## 2018-05-13 RX ADMIN — PANTOPRAZOLE SODIUM 40 MG: 40 INJECTION, POWDER, FOR SOLUTION INTRAVENOUS at 21:43

## 2018-05-13 RX ADMIN — IPRATROPIUM BROMIDE 0.5 MG: 0.5 SOLUTION RESPIRATORY (INHALATION) at 23:30

## 2018-05-13 NOTE — PROGRESS NOTES
Report called to E5 PEDRO LUIS Nix  Patient transferred in stable condition with no c/o pain  Iv site flushed and saline locked  Belongings sent  Baird patent

## 2018-05-13 NOTE — CONSULTS
Patient MRN: [id number]  Date of Service: 5/13/2018  Referring Physician: MICHAEL Jay  Provider Creating Note: Apolinar Rosas PA-C  PCP: Dev Valdez  Reason for Consult:  Anemia with heme-positive stool  HPI  Mitzi Love is a 80 y o  male who was admitted with Sepsis ([place] [address] )  He has baseline dementia and history is  taken from the chart  and his nurse  He has a history of normocytic anemia with a hemoglobin of 10 7 in January 2017  His hemoglobin at admission 5/11 was 8 7 with an MCV of 108  This dropped to 6 5 on 05/12 and he was given 2 units  His hemoglobin is 10 1 today with an MCV of 100  He had 1 stool last evening that was brown, heme-positive  There has been no vomiting, hematemesis, hematochezia or melena  He has been tolerating a dysphagia diet  Folate and vitamin B12 levels are normal, methylmalonic acid is pending  He reports a history of peptic ulcer disease although no records were able to be found on EPIC  His only current complaint is of lower abdominal pain and on and off  episodes of constipation or diarrhea  Past Medical History:   Diagnosis Date    Cardiac disease     COPD (chronic obstructive pulmonary disease) ([place] [address] )     Coronary artery disease     Hyperlipidemia     Hypertension      No past surgical history on file  Medications  Home Medications:   Prior to Admission medications    Medication Sig Start Date End Date Taking? Authorizing Provider   acetaminophen (TYLENOL) 325 mg tablet Take 650 mg by mouth every 6 (six) hours as needed for mild pain    Historical Provider, MD   atorvastatin (LIPITOR) 20 mg tablet Take 20 mg by mouth daily    Historical Provider, MD   clopidogrel (PLAVIX) 75 mg tablet Take 75 mg by mouth every other day      Historical Provider, MD   docusate sodium (COLACE) 100 mg capsule Take 1 capsule by mouth 2 (two) times a day for 30 days 2/3/17 5/8/17  Fabienne Tom DO   escitalopram (LEXAPRO) 10 mg tablet Take 10 mg by mouth daily  Historical Provider, MD   hydrALAZINE (APRESOLINE) 25 mg tablet Take 25 mg by mouth daily    Historical Provider, MD   lidocaine (LIDODERM) 5 % Place 1 patch on the skin daily for 30 days Remove & Discard patch within 12 hours or as directed by MD 2/3/17 3/5/17  Fabienne Tom DO   losartan (COZAAR) 100 MG tablet Take 1 tablet by mouth daily for 30 days 2/3/17 5/8/17  Fabienne Tom DO   MELATONIN ER PO Take 6 mg by mouth daily at bedtime    Historical Provider, MD   metoprolol succinate (TOPROL-XL) 25 mg 24 hr tablet Take 3 tablets by mouth daily for 30 days 2/3/17 5/8/17  Fabienne Tom DO   nitroglycerin (NITROSTAT) 0 4 mg SL tablet Place 0 4 mg under the tongue every 5 (five) minutes as needed for chest pain      Historical Provider, MD   pantoprazole (PROTONIX) 20 mg tablet Take 40 mg by mouth daily as needed for heartburn      Historical Provider, MD   QUEtiapine (SEROquel) 25 mg tablet Take 1 tablet by mouth 2 (two) times a day for 30 days 2/3/17 5/8/17  Fabienne Castillo DO   rosuvastatin (CRESTOR) 10 MG tablet Take 10 mg by mouth daily    Historical Provider, MD   tamsulosin (FLOMAX) 0 4 mg Take 1 capsule by mouth daily with dinner for 30 days 2/3/17 5/8/17  Fabienne Tom DO       Inhouse Medications    Current Facility-Administered Medications:     acetaminophen (TYLENOL) tablet 650 mg, 650 mg, Oral, Q6H PRN    atorvastatin (LIPITOR) tablet 20 mg, 20 mg, Oral, Daily With Dinner, 20 mg at 05/12/18 1641    cefepime (MAXIPIME) IVPB (premix) 1,000 mg, 1,000 mg, Intravenous, Q12H, Stopped at 05/13/18 0054    clopidogrel (PLAVIX) tablet 75 mg, 75 mg, Oral, Every Other Day, 75 mg at 05/11/18 1352    docusate sodium (COLACE) capsule 100 mg, 100 mg, Oral, BID, 100 mg at 05/12/18 1759    escitalopram (LEXAPRO) tablet 10 mg, 10 mg, Oral, Daily, 10 mg at 05/12/18 0940    ipratropium (ATROVENT) 0 02 % inhalation solution 0 5 mg, 0 5 mg, Nebulization, Q8H PRN, 0 5 mg at 05/13/18 0733    levalbuterol (XOPENEX) inhalation solution 1 25 mg, 1 25 mg, Nebulization, Q8H PRN, 1 25 mg at 05/13/18 0733    levothyroxine tablet 75 mcg, 75 mcg, Oral, Early Morning, 75 mcg at 05/13/18 0515    lidocaine (LIDODERM) 5 % patch 1 patch, 1 patch, Transdermal, Daily, 1 patch at 05/12/18 0939    norepinephrine (LEVOPHED) 4 mg (STANDARD CONCENTRATION) IV in sodium chloride 0 9% 250 mL, 1-30 mcg/min, Intravenous, Titrated, Stopped at 05/12/18 0217    pantoprazole (PROTONIX) EC tablet 40 mg, 40 mg, Oral, Daily PRN    pantoprazole (PROTONIX) injection 40 mg, 40 mg, Intravenous, Q12H NERY, 40 mg at 05/12/18 2029    potassium chloride 10 % oral solution 40 mEq, 40 mEq, Oral, Once    QUEtiapine (SEROquel) tablet 25 mg, 25 mg, Oral, BID, 25 mg at 05/12/18 1759    Allergies  Allergies   Allergen Reactions    Penicillins      Social History   reports that he does not drink alcohol or use drugs  Family History  No family history on file  ROS  ROS: Reports:  Abdominal pain, constipation, diarrhea  Denies nausea  Full review of systems unable to be obtained secondary to underlying dementia    Objective   Vitals  /85   Pulse 102   Temp 97 5 °F (36 4 °C) (Temporal)   Resp (!) 23   Ht 5' 4" (1 626 m)   Wt 60 3 kg (132 lb 15 oz)   SpO2 95%   BMI 22 82 kg/m²   General: Alert, no apparent distress  Eyes:  No scleral icterus  ENT:  Mucous membranes moist  Card:  Regular rhythm  Lungs:  Decreased bilaterally  Abdomen:  Soft  Nontender    Bowel sounds are present  Skin:  No jaundice  Extremities:  No edema  Neuro: Alert     Laboratory Studies  Lab Results   Component Value Date    WBC 14 76 (H) 05/13/2018    HGB 10 1 (L) 05/13/2018    HCT 29 9 (L) 05/13/2018     05/13/2018     (H) 05/13/2018     Lab Results   Component Value Date    HGB 10 1 (L) 05/13/2018    HGB 10 0 (L) 05/13/2018    HGB 6 5 (LL) 05/12/2018       Lab Results   Component Value Date    CREATININE 1 33 (H) 05/13/2018    BUN 41 (H) 05/13/2018     (H) 05/13/2018    K 3 1 (L) 05/13/2018     (H) 05/13/2018    CO2 25 05/13/2018    GLUCOSE 133 05/13/2018    CALCIUM 8 4 05/13/2018    PROT 7 3 05/11/2018    ALKPHOS 69 05/11/2018    BILITOT 0 49 05/11/2018    AST 34 05/11/2018    ALT 19 05/11/2018     Lab Results   Component Value Date    PROTIME 15 6 (H) 01/17/2017    INR 1 27 (H) 01/17/2017         Assessment and Plan:  1  Anemia with heme-positive stool  No obvious GI bleed  Continue pantoprazole 40 mg q 12   2   Sepsis secondary to urinary tract infection  Currently on cefepime    3   Dementia    Will discuss the need for endoscopic evaluation with Dr Tiffanie Chowdhury  Principal Problem:    Sepsis [facility])  Active Problems:    MARQUISE (acute kidney injury) ([facility] [address] )    Urinary retention    Mild protein-calorie malnutrition ([facility] [address] )      Kaylynn Rosas PA-C

## 2018-05-13 NOTE — PROGRESS NOTES
Progress Note - Critical Care   Chi Johnson 80 y o  male MRN: [id number]  Unit/Bed#: ICU 10 Encounter: [id number]    Assessment/Plan:  1  Severe sepsis secondary to urinary tract infection  · Improving  Pt afebrile and hemodynamics remain stable  · Urine and blood cultures are negative so far  Today id day 3 of cefepime  Consider deescalation to ceftriaxone with clinical improvement  2  Acute kidney injury, multifactorial secondary to sepsis and obstructive uropathy  · Improving  Creatinine better today  Continue to trend renal indices and monitoring intake and output  3  Urinary retention secondary to BPH status post Cantrell  · Maintain cantrell and plan for outpatient voiding trial per urology recommendations  4  ABLA secondary to suspected upper GI bleed  · Hgb down to 6 5 yesterday with heme positive stool without obvious gross bleeding  Pt received 2 units PRBCs, Hgb improved 6 5>>10 1  · Start protonix bid  Consult GI   5  Sick sinus syndrome status post previous pacemaker placement  6  CAD status post CABG  · Continue Plavix  7  COPD without acute exacerbation  · Nebulized bronchodilators PRN  8  Hypothyroid  · Continue synthroid  9  Organic dementia  · Continue lexapro and seroquel  10  Dysphagia  · Mechanical soft level diet with thin liquids per speech  Aspiration precautions  Stop IV fluids once taking adequate PO   11  Mild hypokalemia  · Will replete  _____________________________________________________________________    HPI/24hr events:   Afebrile  No acute events overnight      Medications:    Current Facility-Administered Medications:  acetaminophen 650 mg Oral Q6H PRN Bevelyn Schooling, CRNP    atorvastatin 20 mg Oral Daily With Shahla [place]le[place], CRNP    cefepime 1,000 mg Intravenous Q12H Bevelyn Schooling, CRNP Last Rate: Stopped (05/13/18 0054)   clopidogrel 75 mg Oral Every Other Day Bevelyn Schooling, CRNP    docusate sodium 100 mg Oral BID Bevelyn Schooling, CRNP    escitalopram 10 mg Oral Daily Ardia Fan, CRNP    ipratropium 0 5 mg Nebulization Q8H PRN Mikey Phillips MD    levalbuterol 1 25 mg Nebulization Q8H PRN Ardia Fan, CRNP    levothyroxine 75 mcg Oral Early Morning Mikey Phillips MD    lidocaine 1 patch Transdermal Daily Ardia Fan, CRNP    norepinephrine 1-30 mcg/min Intravenous Titrated Ardia Fan, CRNP Last Rate: Stopped (05/12/18 0217)   pantoprazole 40 mg Oral Daily PRN Ardia Fan, CRNP    pantoprazole 40 mg Intravenous Q12H [address] [place] Spatzer, CRNP    potassium chloride 40 mEq Oral Once [place] Sp[place], CRNP    QUEtiapine 25 mg Oral BID Ardia Fan, CRNP    sodium chloride 75 mL/hr Intravenous Continuous [place] Spat[place], CRNP Last Rate: Stopped (05/12/18 2236)         norepinephrine 1-30 mcg/min Last Rate: Stopped (05/12/18 0217)   sodium chloride 75 mL/hr Last Rate: Stopped (05/12/18 2236)         Physical exam:  Vitals: Body mass index is 22 82 kg/m²  Blood pressure 141/86, pulse 100, temperature 97 5 °F (36 4 °C), temperature source Temporal, resp  rate (!) 25, height 5' 4" (1 626 m), weight 60 3 kg (132 lb 15 oz), SpO2 95 %  ,  Temp  Min: 97 °F (36 1 °C)  Max: 99 7 °F (37 6 °C)  IBW: 59 2 kg    SpO2: 95 %  SpO2 Activity: At Rest  O2 Device: Nasal cannula      Intake/Output Summary (Last 24 hours) at 05/13/18 0645  Last data filed at 05/13/18 1707   Gross per 24 hour   Intake           1797 5 ml   Output             1765 ml   Net             32 5 ml       Invasive/non-invasive ventilation settings:   Respiratory    Lab Data (Last 4 hours)    None         O2/Vent Data (Last 4 hours)    None              Invasive Devices     Central Venous Catheter Line            CVC Central Lines 05/11/18 Triple 16cm 1 day          Drain            Urethral Catheter Latex 16 Fr  1 day                  Physical Exam:  Gen:  Awake, alert, occasional verbal outbursts, no acute distress  HEENT:  Atraumatic, normocephalic, extraocular movements intact, pupils 4 mm equal and reactive, oropharynx clear  Neck:  Supple, trachea midline, no JVD, no lymphadenopathy  Chest:  Clear to auscultation  Cor:  Single T0/X2, 4/6 systolic murmur, no rubs or gallops, regular rate and rhythm  Abd:  Soft, nontender, nondistended, bowel sounds normoactive  Ext:  No edema, no clubbing or cyanosis  Neuro:  Oriented to self, intermittently oriented to place and time, moves all extremities, no focal deficits  Skin:  Warm, dry      Diagnostic Data:  Lab: I have personally reviewed pertinent lab results  CBC:     Results from last 7 days  Lab Units 05/13/18  0521 05/13/18  0112 05/12/18  1225 05/12/18  0349 05/11/18  1323   WBC Thousand/uL 14 76*  --   --  12 98* 24 38*   HEMOGLOBIN g/dL 10 1* 10 0* 6 5* 7 0* 8 7*   HEMATOCRIT % 29 9* 29 5* 20 1* 21 1* 26 6*   PLATELETS Thousands/uL 181  --   --  173 241       CMP:     Results from last 7 days  Lab Units 05/13/18  0521 05/12/18  0349 05/11/18  1323   SODIUM mmol/L 148* 143 143   POTASSIUM mmol/L 3 1* 3 6 4 7   CHLORIDE mmol/L 113* 109* 105   CO2 mmol/L 25 24 25   BUN mg/dL 41* 60* 75*   CREATININE mg/dL 1 33* 1 88* 2 82*   CALCIUM mg/dL 8 4 8 2* 8 1*   TOTAL PROTEIN g/dL  --   --  7 3   BILIRUBIN TOTAL mg/dL  --   --  0 49   ALK PHOS U/L  --   --  69   ALT U/L  --   --  19   AST U/L  --   --  34   GLUCOSE RANDOM mg/dL 133 122 113     PT/INR:   No results found for: PT, INR,   Magnesium:     Phosphorous:       Microbiology:    Results from last 7 days  Lab Units 05/11/18  1511 05/11/18  1137 05/11/18  1132   BLOOD CULTURE   --  No Growth at 24 hrs  No Growth at 24 hrs  URINE CULTURE  No Growth <1000 cfu/mL  --   --        Imaging:  No new imaging    Cardiac lab/EKG/telemetry/ECHO:   Sinus rhythm on telemetry    VTE Prophylaxis:  SCDs    Code Status: Level 3 - DNAR and DNI    Pixie Spruce Spatzer, CRNP    Portions of the record may have been created with voice recognition software   Occasional wrong word or "sound a like" substitutions may have occurred due to the inherent limitations of voice recognition software  Read the chart carefully and recognize, using context, where substitutions have occurred

## 2018-05-13 NOTE — PROGRESS NOTES
Pt received from ICU via PCA transport  Pt assessed, I agree with the previous documented assessment documented by PEDRO LUIS Goff Marmaduke 3/13 @ 0800  Pt is now oriented to person and place, but still disoriented to time and situation  Bed is in lowest and locked position, bed alarm on, call bell within reach

## 2018-05-14 PROBLEM — N30.01 ACUTE CYSTITIS WITH HEMATURIA: Status: ACTIVE | Noted: 2018-05-14

## 2018-05-14 PROBLEM — E87.6 HYPOKALEMIA: Status: ACTIVE | Noted: 2018-05-14

## 2018-05-14 PROBLEM — R13.19 OTHER DYSPHAGIA: Status: ACTIVE | Noted: 2018-05-14

## 2018-05-14 LAB
FERRITIN SERPL-MCNC: 469 NG/ML (ref 8–388)
IRON SATN MFR SERPL: 19 %
IRON SERPL-MCNC: 41 UG/DL (ref 65–175)
TIBC SERPL-MCNC: 212 UG/DL (ref 250–450)
VIT B12 SERPL-MCNC: 785 PG/ML (ref 100–900)

## 2018-05-14 PROCEDURE — C9113 INJ PANTOPRAZOLE SODIUM, VIA: HCPCS | Performed by: NURSE PRACTITIONER

## 2018-05-14 PROCEDURE — 94640 AIRWAY INHALATION TREATMENT: CPT

## 2018-05-14 PROCEDURE — 92526 ORAL FUNCTION THERAPY: CPT

## 2018-05-14 PROCEDURE — 99232 SBSQ HOSP IP/OBS MODERATE 35: CPT | Performed by: FAMILY MEDICINE

## 2018-05-14 PROCEDURE — 99232 SBSQ HOSP IP/OBS MODERATE 35: CPT | Performed by: INTERNAL MEDICINE

## 2018-05-14 PROCEDURE — 94760 N-INVAS EAR/PLS OXIMETRY 1: CPT

## 2018-05-14 PROCEDURE — 82607 VITAMIN B-12: CPT | Performed by: FAMILY MEDICINE

## 2018-05-14 RX ADMIN — DOCUSATE SODIUM 100 MG: 100 CAPSULE, LIQUID FILLED ORAL at 08:30

## 2018-05-14 RX ADMIN — PANTOPRAZOLE SODIUM 40 MG: 40 INJECTION, POWDER, FOR SOLUTION INTRAVENOUS at 08:30

## 2018-05-14 RX ADMIN — PANTOPRAZOLE SODIUM 40 MG: 40 INJECTION, POWDER, FOR SOLUTION INTRAVENOUS at 21:43

## 2018-05-14 RX ADMIN — LEVOTHYROXINE SODIUM 75 MCG: 75 TABLET ORAL at 05:18

## 2018-05-14 RX ADMIN — ESCITALOPRAM OXALATE 10 MG: 10 TABLET ORAL at 08:30

## 2018-05-14 RX ADMIN — QUETIAPINE FUMARATE 25 MG: 25 TABLET ORAL at 08:30

## 2018-05-14 RX ADMIN — DOCUSATE SODIUM 100 MG: 100 CAPSULE, LIQUID FILLED ORAL at 17:07

## 2018-05-14 RX ADMIN — IPRATROPIUM BROMIDE 0.5 MG: 0.5 SOLUTION RESPIRATORY (INHALATION) at 13:40

## 2018-05-14 RX ADMIN — LIDOCAINE 1 PATCH: 50 PATCH CUTANEOUS at 08:41

## 2018-05-14 RX ADMIN — ATORVASTATIN CALCIUM 20 MG: 20 TABLET, FILM COATED ORAL at 17:07

## 2018-05-14 RX ADMIN — CEFUROXIME AXETIL 500 MG: 250 TABLET ORAL at 21:44

## 2018-05-14 RX ADMIN — LEVALBUTEROL HYDROCHLORIDE 1.25 MG: 1.25 SOLUTION, CONCENTRATE RESPIRATORY (INHALATION) at 13:40

## 2018-05-14 RX ADMIN — QUETIAPINE FUMARATE 25 MG: 25 TABLET ORAL at 17:07

## 2018-05-14 RX ADMIN — CEFUROXIME AXETIL 500 MG: 250 TABLET ORAL at 08:30

## 2018-05-14 RX ADMIN — TAMSULOSIN HYDROCHLORIDE 0.4 MG: 0.4 CAPSULE ORAL at 17:07

## 2018-05-14 NOTE — ASSESSMENT & PLAN NOTE
Likely secondary to [address] [facility]  Urology input appreciated  To f/u with Urology outpatient for voiding trial

## 2018-05-14 NOTE — CASE MANAGEMENT
Initial Clinical Review    Admission: Date/Time/Statement: 5/11/18 @ 0936     Orders Placed This Encounter   Procedures    Inpatient Admission     Standing Status:   Standing     Number of Occurrences:   1     Order Specific Question:   Admitting Physician     Answer:   Reny Sibley [[id number]]     Order Specific Question:   Level of Care     Answer:   Critical Care [15]     Order Specific Question:   Estimated length of stay     Answer:   More than 2 Midnights     Order Specific Question:   Certification     Answer:   I certify that inpatient services are medically necessary for this patient for a duration of greater than two midnights  See H&P and MD Progress Notes for additional information about the patient's course of treatment  ED: Date/Time/Mode of Arrival:   ED Arrival Information     Patient not seen in ED                       Chief Complaint: No chief complaint on file  History of Illness:    Yonny Polanco is a 80 y o  male who is a resident of [facility]  He has a known history of dementia  Therefore, information is obtained from prior records      Patient was admitted to [facility] after being treated for a urinary tract infection at above and beyond with Macrobid with no improvement  While at [facility] the patient became hypotensive and less responsive  He was started on pressors to maintain blood pressure  He was therefore transferred to [place] [address] intensive care unit for evaluation and treatment      Upon arrival patient does not answer questions  He is easily annoyed and yells to be left alone, that he just wants to get warm  He does not follow commands    He will require greater than 2 midnight stay       ED Vital Signs:   ED Triage Vitals   Temperature Pulse Respirations Blood Pressure SpO2   05/11/18 0900 05/11/18 0940 05/11/18 0940 05/11/18 0940 05/11/18 0940   97 8 °F (36 6 °C) 86 (!) 24 136/68 96 %      Temp Source Heart Rate Source Patient Position - Orthostatic VS BP Location FiO2 (%)   05/11/18 0900 05/12/18 2232 05/13/18 1446 05/12/18 2232 --   Temporal Monitor Lying Left arm       Pain Score       05/11/18 1045       No Pain        Wt Readings from Last 1 Encounters:   05/12/18 60 3 kg (132 lb 15 oz)       Vital Signs (abnormal):    above    Abnormal Labs/Diagnostic Test Results:    CXR:  NAD  WBC    24 38  H/H    8 7/26 6         Repeat    7/21 1       6 5/20 1  RBC    2 46  Abs  neutro    21 49  BUN/Creat    75/2 82  Albumin   2 2  Lactic  Acid    2 5  U/A   Mod  Blood     1+ protein     +  Nitrite      lg  leukocyte    ED Treatment:   Medication Administration - No Administrations Displayed (No Start Event Found)     None          Past Medical/Surgical History: Active Ambulatory Problems     Diagnosis Date Noted    Rapid atrial fibrillation ([facility] ) 01/17/2017    Mitral regurgitation 01/17/2017    Cognitive decline 01/17/2017    MARQUISE (acute kidney injury) ([facility] ) 01/17/2017    Dementia with behavioral disturbance 01/19/2017    [facility] 01/22/2017    Coronary artery disease involving native coronary artery of native heart 01/22/2017    Hypertension 01/24/2017     Resolved Ambulatory Problems     Diagnosis Date Noted    Fall 01/17/2017    UTI (urinary tract infection) 01/17/2017    Transaminitis 01/17/2017    Elevated troponin 01/17/2017    Sepsis ([facility] ) 01/17/2017    Rhabdomyolysis 01/17/2017     Past Medical History:   Diagnosis Date    Cardiac disease     COPD (chronic obstructive pulmonary disease) ([facility] )     Coronary artery disease     Hyperlipidemia     Hypertension        Admitting Diagnosis: Septic shock ([facility] ) [A41 9, R65 21]    Age/Sex: 80 y o  male    1  Assessment/Plan:    Sepsis likely source is urinary tract infection  · Continue IV cefepime  Baird catheter  Fluid resuscitation  Check lactic acid  Send blood and urine cultures  2  Urinary retention, likely secondary to BPH    · [facility] personnel were unable to place a Baird catheter secondary to obstruction  Consult Urology for Baird placement  Hold Flomax for now secondary to hypotension related to 1   3  Leukocytosis secondary to 1   · Monitor CBC, continue antibiotics  4  Acute kidney injury  · Monitor renal indices, continue IV fluids  5  Sick sinus syndrome status post pacemaker in remote past   6  Coronary artery disease continue Plavix and aspirin hold beta blockade secondary to hypotension  7  Hypothyroid  Continue levothyroxine  8  COPD without exacerbation  Updraft nebulizer treatments  9  Dysphagia  Patient was on a mechanical soft diet will obtain swallow eval   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    Continue psych meds       Admission Orders:    IP   5/11   @   1104  Scheduled Meds:   Current Facility-Administered Medications:  acetaminophen 650 mg Oral Q6H PRN Beth Clause, CRNP    atorvastatin 20 mg Oral Daily With Kobi Rj, CRNP    cefuroxime 500 mg Oral Q12H [address], CRNP    docusate sodium 100 mg Oral BID Beth Clause, CRNP    escitalopram 10 mg Oral Daily Beth Clause, CRNP    ipratropium 0 5 mg Nebulization Q8H PRN Marc Flores MD    levalbuterol 1 25 mg Nebulization Q8H PRN Beth Clause, CRNP    levothyroxine 75 mcg Oral Early Morning Marc Flores MD    lidocaine 1 patch Transdermal Daily Beth Clause, CRNP    norepinephrine 1-30 mcg/min Intravenous Titrated Beth Clause, CRNP Last Rate: Stopped (05/12/18 0217)   pantoprazole 40 mg Oral Daily PRN Beth Clause, CRNP    pantoprazole 40 mg Intravenous Q12H [facility] Nilsa Gales Spatzer, MICHAEL    QUEtiapine 25 mg Oral BID Beth Clause, CRNP    tamsulosin 0 4 mg Oral Daily With Todd Felty, MD      Continuous Infusions:   norepinephrine 1-30 mcg/min Last Rate: Stopped (05/12/18 0217)     PRN Meds:   acetaminophen    ipratropium    levalbuterol    pantoprazole     O2  2L  Cons  Urology  Cons speech  2  U PRBC    5/12  Cons  GI

## 2018-05-14 NOTE — ASSESSMENT & PLAN NOTE
Malnutrition Findings:   Malnutrition type: Chronic illness  Degree of Malnutrition: Malnutrition of mild degree    BMI Findings: Body mass index is 22 82 kg/m²     Nutrition consult

## 2018-05-14 NOTE — SPEECH THERAPY NOTE
Speech Language/Pathology    Speech/Language Pathology Progress Note    Patient Name: Chi Johnson  Today's Date: 5/14/2018     Problem List  Patient Active Problem List   Diagnosis    [facility] ([facility] )    Mitral regurgitation    Cognitive decline    MARQUISE (acute kidney injury) ([facility] )    Dementia with behavioral disturbance    PAT (paroxysmal atrial tachycardia) ([facility] )    Coronary artery disease involving native coronary artery of native heart    Hypertension    Sepsis ([place] [address] )    Urinary retention    Mild protein-calorie malnutrition ([place] [address] )        Past Medical History  Past Medical History:   Diagnosis Date    Cardiac disease     COPD (chronic obstructive pulmonary disease) ([facility] )     Coronary artery disease     Hyperlipidemia     Hypertension         Past Surgical History  No past surgical history on file  Subjective:  "how did I get them?" re his blue eyes  Confused  Mood seems to fluctuate  Opened his  Blinds  Objective:   Noted many lab values are out of the normal range  Seen for f/u dysphagia tx  Reportedly yells/calls out often  Pt new to this SLP  PCA reported pt was pocketing food this am, not chewing well, and coughing on thin liquids  Currently sounds congested  2L nc  Pt seen w/ small amount puree and nectar thick  Slow oral manipulation and transit of the puree  Grimaced w/ swallow but denied any pain when I asked  Took small sips of the nectar thick by straw  Min swallow delay  No cough  Refused any more, wanted to lie back  Assessment:  Sounds congested  Confused  Reduced intake  Needs redirection  reportedly pocketing soft foods, coughing on thin  Plan/Recommendations:  Downgrade to dysphagia 1 puree and nectar thick for now

## 2018-05-14 NOTE — ASSESSMENT & PLAN NOTE
Urine cultures not obtained prior to antibiotics, available urine cultures reveal no growth, suggestive UTI susceptible to Rocephin   Patient to complete antibiotic course with Ceftin  Will monitor CBC/vital signs

## 2018-05-14 NOTE — ASSESSMENT & PLAN NOTE
Due to poor oral intake  Replace with oral potassium  Repeat BMP in the morning  Nutrition consulted

## 2018-05-14 NOTE — PROGRESS NOTES
Progress Note - Jennifer Romeo 1934, 80 y o  male MRN: [id number]    Unit/Bed#: E5 -01 Encounter: [id number]    Primary Care Provider: Vinny Peng MD   Date and time admitted to hospital: 5/11/2018  9:36 AM        * Sepsis [facility])   Assessment & Plan    Patient was initially in septic shock requiring pressors now resolved  Secondary to UTI, no urine cultures available, repeat cultures no growth suggesting Rocephin effective  Continue Ceftin  Monitor CBC/VS          Acute cystitis with hematuria   Assessment & Plan    Urine cultures not obtained prior to antibiotics, available urine cultures reveal no growth, suggestive UTI susceptible to Rocephin   Patient to complete antibiotic course with Ceftin  Will monitor CBC/vital signs        Other dysphagia   Assessment & Plan    Speech therapy evaluated and diet adjusted to pureed/nectar thickened liquids        Hypokalemia   Assessment & Plan    Due to poor oral intake  Replace with oral potassium  Repeat BMP in the morning  Nutrition consulted        Mild protein-calorie malnutrition ([facility] )   Assessment & Plan    Malnutrition Findings:   Malnutrition type: Chronic illness  Degree of Malnutrition: Malnutrition of mild degree    BMI Findings: Body mass index is 22 82 kg/m²  Nutrition consult        Urinary retention   Assessment & Plan    Likely secondary to [address] [facility]  Urology input appreciated  To f/u with Urology outpatient for voiding trial        Dementia with behavioral disturbance   Assessment & Plan    Will discuss with case management regarding discharge planning  Update listed contact        MARQUISE (acute kidney injury) ([facility] )   Assessment & Plan    Continues to improved with Cr near baseline  Monitor daily BMP  Avoid nephrotoxins          VTE Pharmacologic Prophylaxis:   Pharmacologic: None due to anemia  Mechanical VTE Prophylaxis in Place: Yes    Patient Centered Rounds: I have performed bedside rounds with nursing staff today      Discussions with Specialists or Other Care Team Provider: Reviewed chart    Education and Discussions with Family / Patient: will update    Time Spent for Care: 30 minutes  More than 50% of total time spent on counseling and coordination of care as described above  Current Length of Stay: 3 day(s)    Current Patient Status: Inpatient   Certification Statement: The patient will continue to require additional inpatient hospital stay due to need for close monitoring    Discharge Plan: TBD    Code Status: Level 3 - DNAR and DNI      Subjective:   Patient seen and examined  He has no complaints  Night is uneventful  Objective:     Vitals:   Temp (24hrs), Av 5 °F (36 9 °C), Min:97 6 °F (36 4 °C), Max:99 9 °F (37 7 °C)    HR:  [] 111  Resp:  [20-39] 20  BP: (142-161)/(73-92) 161/92  SpO2:  [91 %-96 %] 91 %  Body mass index is 22 82 kg/m²  Input and Output Summary (last 24 hours): Intake/Output Summary (Last 24 hours) at 18 1359  Last data filed at 18 0732   Gross per 24 hour   Intake              240 ml   Output             1730 ml   Net            -1490 ml       Physical Exam:     Physical Exam   Constitutional: No distress  HENT:   Head: Normocephalic and atraumatic  Eyes: Conjunctivae are normal    Neck: No JVD present  Cardiovascular: Normal rate and regular rhythm  No murmur heard  Pulmonary/Chest: Effort normal  No respiratory distress  He has no wheezes  He has no rales  Abdominal: Soft  He exhibits no distension  There is no tenderness  There is no guarding  Musculoskeletal: He exhibits no edema  Neurological: He is alert  He exhibits normal muscle tone  Skin: Skin is warm and dry  Psychiatric: Cognition and memory are impaired         Additional Data:     Labs:      Results from last 7 days  Lab Units 18  0521  18  1323   WBC Thousand/uL 14 76*  < > 24 38*   HEMOGLOBIN g/dL 10 1*  < > 8 7*   HEMATOCRIT % 29 9*  < > 26 6*   PLATELETS Thousands/uL 181  < > 241   NEUTROS PCT %  --   --  88*   LYMPHS PCT %  --   --  5*   LYMPHO PCT % 2*  < >  --    MONOS PCT %  --   --  7   MONO PCT MAN % 7  < >  --    EOS PCT %  --   --  0   EOSINO PCT MANUAL % 0  < >  --    < > = values in this interval not displayed  Results from last 7 days  Lab Units 05/13/18  0521  05/11/18  1323   SODIUM mmol/L 148*  < > 143   POTASSIUM mmol/L 3 1*  < > 4 7   CHLORIDE mmol/L 113*  < > 105   CO2 mmol/L 25  < > 25   BUN mg/dL 41*  < > 75*   CREATININE mg/dL 1 33*  < > 2 82*   CALCIUM mg/dL 8 4  < > 8 1*   TOTAL PROTEIN g/dL  --   --  7 3   BILIRUBIN TOTAL mg/dL  --   --  0 49   ALK PHOS U/L  --   --  69   ALT U/L  --   --  19   AST U/L  --   --  34   GLUCOSE RANDOM mg/dL 133  < > 113   < > = values in this interval not displayed  * I Have Reviewed All Lab Data Listed Above  * Additional Pertinent Lab Tests Reviewed: [facility] Admission Reviewed    Imaging:    Imaging Reports Reviewed Today Include: no new    Recent Cultures (last 7 days):       Results from last 7 days  Lab Units 05/11/18  1511 05/11/18  1137 05/11/18  1132   BLOOD CULTURE   --  No Growth at 48 hrs  No Growth at 48 hrs     URINE CULTURE  No Growth <1000 cfu/mL  --   --        Last 24 Hours Medication List:     Current Facility-Administered Medications:  acetaminophen 650 mg Oral Q6H PRN MICHAEL [place]    atorvastatin 20 mg Oral Daily With [employer], CRNP    cefuroxime 500 mg Oral Q12H [address] [facility] [address], CRNP    docusate sodium 100 mg Oral BID MICHAEL [place]    escitalopram 10 mg Oral Daily MICHAEL [place]    ipratropium 0 5 mg Nebulization Q8H PRN Mikey Phillips MD    levalbuterol 1 25 mg Nebulization Q8H PRN MICHAEL [place]    levothyroxine 75 mcg Oral Early Morning Mikey Phillips MD    lidocaine 1 patch Transdermal Daily MICHAEL [place]    norepinephrine 1-30 mcg/min Intravenous Titrated Amanda Downeyk CRNP Last Rate: Stopped (05/12/18 1747) pantoprazole 40 mg Oral Daily PRN Johnice Layer, CRNP    pantoprazole 40 mg Intravenous Q12H [address] [place] Spatzer, MICHAEL    QUEtiapine 25 mg Oral BID Johnice Layer, CRNP    tamsulosin 0 4 mg Oral Daily With Yelena Hadley MD         Today, Patient Was Seen By: Rosalba Multani MD    ** Please Note: Dictation voice to text software may have been used in the creation of this document   **

## 2018-05-14 NOTE — ASSESSMENT & PLAN NOTE
Patient was initially in septic shock requiring pressors now resolved  Secondary to UTI, no urine cultures available, repeat cultures no growth suggesting Rocephin effective  Continue Ceftin  Monitor CBC/VS

## 2018-05-15 ENCOUNTER — APPOINTMENT (INPATIENT)
Dept: RADIOLOGY | Facility: HOSPITAL | Age: 83
DRG: 871 | End: 2018-05-15
Payer: MEDICARE

## 2018-05-15 PROBLEM — D53.9 MACROCYTIC ANEMIA: Status: ACTIVE | Noted: 2018-05-15

## 2018-05-15 PROBLEM — R00.0 SINUS TACHYCARDIA: Status: ACTIVE | Noted: 2018-05-15

## 2018-05-15 LAB
ALBUMIN SERPL BCP-MCNC: 2.2 G/DL (ref 3.5–5)
ALP SERPL-CCNC: 70 U/L (ref 46–116)
ALT SERPL W P-5'-P-CCNC: 47 U/L (ref 12–78)
ANION GAP SERPL CALCULATED.3IONS-SCNC: 13 MMOL/L (ref 4–13)
AST SERPL W P-5'-P-CCNC: 35 U/L (ref 5–45)
ATRIAL RATE: 113 BPM
BASOPHILS # BLD AUTO: 0.02 THOUSANDS/ΜL (ref 0–0.1)
BASOPHILS NFR BLD AUTO: 0 % (ref 0–1)
BILIRUB SERPL-MCNC: 0.98 MG/DL (ref 0.2–1)
BUN SERPL-MCNC: 32 MG/DL (ref 5–25)
CALCIUM SERPL-MCNC: 8.7 MG/DL (ref 8.3–10.1)
CHLORIDE SERPL-SCNC: 111 MMOL/L (ref 100–108)
CO2 SERPL-SCNC: 24 MMOL/L (ref 21–32)
CREAT SERPL-MCNC: 1.24 MG/DL (ref 0.6–1.3)
EOSINOPHIL # BLD AUTO: 0.01 THOUSAND/ΜL (ref 0–0.61)
EOSINOPHIL NFR BLD AUTO: 0 % (ref 0–6)
ERYTHROCYTE [DISTWIDTH] IN BLOOD BY AUTOMATED COUNT: 18.3 % (ref 11.6–15.1)
GFR SERPL CREATININE-BSD FRML MDRD: 53 ML/MIN/1.73SQ M
GLUCOSE SERPL-MCNC: 129 MG/DL (ref 65–140)
HCT VFR BLD AUTO: 28.9 % (ref 36.5–49.3)
HGB BLD-MCNC: 9.3 G/DL (ref 12–17)
LYMPHOCYTES # BLD AUTO: 0.84 THOUSANDS/ΜL (ref 0.6–4.47)
LYMPHOCYTES NFR BLD AUTO: 7 % (ref 14–44)
MCH RBC QN AUTO: 32.9 PG (ref 26.8–34.3)
MCHC RBC AUTO-ENTMCNC: 32.2 G/DL (ref 31.4–37.4)
MCV RBC AUTO: 102 FL (ref 82–98)
METHYLMALONATE SERPL-SCNC: 404 NMOL/L (ref 0–378)
MONOCYTES # BLD AUTO: 0.51 THOUSAND/ΜL (ref 0.17–1.22)
MONOCYTES NFR BLD AUTO: 4 % (ref 4–12)
NEUTROPHILS # BLD AUTO: 10.76 THOUSANDS/ΜL (ref 1.85–7.62)
NEUTS SEG NFR BLD AUTO: 89 % (ref 43–75)
NRBC BLD AUTO-RTO: 0 /100 WBCS
PLATELET # BLD AUTO: 187 THOUSANDS/UL (ref 149–390)
PMV BLD AUTO: 10.1 FL (ref 8.9–12.7)
POTASSIUM SERPL-SCNC: 3.8 MMOL/L (ref 3.5–5.3)
PROT SERPL-MCNC: 7.6 G/DL (ref 6.4–8.2)
QRS AXIS: 91 DEGREES
QRSD INTERVAL: 88 MS
QT INTERVAL: 296 MS
QTC INTERVAL: 402 MS
RBC # BLD AUTO: 2.83 MILLION/UL (ref 3.88–5.62)
SODIUM SERPL-SCNC: 148 MMOL/L (ref 136–145)
T WAVE AXIS: 75 DEGREES
VENTRICULAR RATE: 111 BPM
WBC # BLD AUTO: 12.14 THOUSAND/UL (ref 4.31–10.16)

## 2018-05-15 PROCEDURE — 80053 COMPREHEN METABOLIC PANEL: CPT | Performed by: FAMILY MEDICINE

## 2018-05-15 PROCEDURE — G8978 MOBILITY CURRENT STATUS: HCPCS

## 2018-05-15 PROCEDURE — 99232 SBSQ HOSP IP/OBS MODERATE 35: CPT | Performed by: INTERNAL MEDICINE

## 2018-05-15 PROCEDURE — 93010 ELECTROCARDIOGRAM REPORT: CPT | Performed by: INTERNAL MEDICINE

## 2018-05-15 PROCEDURE — 97167 OT EVAL HIGH COMPLEX 60 MIN: CPT

## 2018-05-15 PROCEDURE — 93005 ELECTROCARDIOGRAM TRACING: CPT

## 2018-05-15 PROCEDURE — C9113 INJ PANTOPRAZOLE SODIUM, VIA: HCPCS | Performed by: NURSE PRACTITIONER

## 2018-05-15 PROCEDURE — G8979 MOBILITY GOAL STATUS: HCPCS

## 2018-05-15 PROCEDURE — 71045 X-RAY EXAM CHEST 1 VIEW: CPT

## 2018-05-15 PROCEDURE — 99232 SBSQ HOSP IP/OBS MODERATE 35: CPT | Performed by: FAMILY MEDICINE

## 2018-05-15 PROCEDURE — G8987 SELF CARE CURRENT STATUS: HCPCS

## 2018-05-15 PROCEDURE — 85025 COMPLETE CBC W/AUTO DIFF WBC: CPT | Performed by: FAMILY MEDICINE

## 2018-05-15 PROCEDURE — 97163 PT EVAL HIGH COMPLEX 45 MIN: CPT

## 2018-05-15 PROCEDURE — G8988 SELF CARE GOAL STATUS: HCPCS

## 2018-05-15 RX ORDER — SODIUM CHLORIDE 9 MG/ML
75 INJECTION, SOLUTION INTRAVENOUS CONTINUOUS
Status: DISCONTINUED | OUTPATIENT
Start: 2018-05-15 | End: 2018-05-15

## 2018-05-15 RX ADMIN — DOCUSATE SODIUM 100 MG: 100 CAPSULE, LIQUID FILLED ORAL at 17:18

## 2018-05-15 RX ADMIN — LEVOTHYROXINE SODIUM 75 MCG: 75 TABLET ORAL at 05:17

## 2018-05-15 RX ADMIN — CEFUROXIME AXETIL 500 MG: 250 TABLET ORAL at 08:01

## 2018-05-15 RX ADMIN — PANTOPRAZOLE SODIUM 40 MG: 40 INJECTION, POWDER, FOR SOLUTION INTRAVENOUS at 20:46

## 2018-05-15 RX ADMIN — ESCITALOPRAM OXALATE 10 MG: 10 TABLET ORAL at 08:01

## 2018-05-15 RX ADMIN — DOCUSATE SODIUM 100 MG: 100 CAPSULE, LIQUID FILLED ORAL at 08:01

## 2018-05-15 RX ADMIN — ATORVASTATIN CALCIUM 20 MG: 20 TABLET, FILM COATED ORAL at 16:40

## 2018-05-15 RX ADMIN — SODIUM CHLORIDE 75 ML/HR: 0.9 INJECTION, SOLUTION INTRAVENOUS at 12:12

## 2018-05-15 RX ADMIN — LIDOCAINE 1 PATCH: 50 PATCH CUTANEOUS at 08:02

## 2018-05-15 RX ADMIN — TAMSULOSIN HYDROCHLORIDE 0.4 MG: 0.4 CAPSULE ORAL at 16:40

## 2018-05-15 RX ADMIN — PANTOPRAZOLE SODIUM 40 MG: 40 INJECTION, POWDER, FOR SOLUTION INTRAVENOUS at 08:01

## 2018-05-15 RX ADMIN — QUETIAPINE FUMARATE 25 MG: 25 TABLET ORAL at 08:01

## 2018-05-15 RX ADMIN — QUETIAPINE FUMARATE 25 MG: 25 TABLET ORAL at 17:18

## 2018-05-15 RX ADMIN — CEFUROXIME AXETIL 500 MG: 250 TABLET ORAL at 20:46

## 2018-05-15 NOTE — CASE MANAGEMENT
Continued Stay Review    Date:    5/15/2018    Vital Signs: /84 (BP Location: Left arm)   Pulse (!) 109   Temp 99 °F (37 2 °C) (Temporal)   Resp 18   Ht 5' 4" (1 626 m)   Wt 60 3 kg (132 lb 15 oz)   SpO2 90%   BMI 22 82 kg/m²     Medications:   Scheduled Meds:   Current Facility-Administered Medications:  acetaminophen 650 mg Oral Q6H PRN Refugia Jatinder, CRNP    atorvastatin 20 mg Oral Daily With Ramu Delatorre, CRNP    cefuroxime 500 mg Oral Q12H [address] [facility] [address], CRNP    docusate sodium 100 mg Oral BID Refugia Jatinder, CRNP    escitalopram 10 mg Oral Daily Refugia Jatinder, CRNP    ipratropium 0 5 mg Nebulization Q8H PRN Suresh Ireland MD    levalbuterol 1 25 mg Nebulization Q8H PRN Refugia Jatinder, CRNP    levothyroxine 75 mcg Oral Early Morning Suresh Ireland MD    lidocaine 1 patch Transdermal Daily Refugia Jatinder, CRNP    pantoprazole 40 mg Oral Daily PRN Refugia Jatinder, CRNP    pantoprazole 40 mg Intravenous Q12H [address] Bernette Batten Spatzer, CRLEIA    QUEtiapine 25 mg Oral BID Refugia Jatinder, CRNP    sodium chloride 75 mL/hr Intravenous Continuous Kamaljit Tucker MD Last Rate: 75 mL/hr (05/15/18 1212)   tamsulosin 0 4 mg Oral Daily With Maya Lo MD      Continuous Infusions:   sodium chloride 75 mL/hr Last Rate: 75 mL/hr (05/15/18 1212)     PRN Meds:   acetaminophen    ipratropium    levalbuterol    pantoprazole    Abnormal Labs/Diagnostic Results:    WBC   12 14  H/H  9 3/28 9  NA  148  BUN  32    Age/Sex: 80 y o  male     Assessment/Plan:    Sepsis ([facility] )   Assessment & Plan     Patient was initially in septic shock requiring pressors now resolved  Secondary to UTI, no urine cultures available, repeat cultures no growth suggesting Rocephin effective  Continue Ceftin for total of 7 days antibiotics, day 3/4 Ceftin  Monitor CBC/VS             Sinus tachycardia   Assessment & Plan     May be due to acute on chronic anemia vs due to infection  Will obtain EKG  Place on gentle IV fluids          Macrocytic anemia   Assessment & Plan     Along with heme-positive stools, likely multifactorial anemia with workup consisted with possible MDS as well as anemia of chronic disease  Patient's H&H continues to drop warranting consideration for EGD/colonoscopy, GI input appreciated  Monitor CBC daily and transfuse for Hb<7          Acute cystitis with hematuria   Assessment & Plan     Urine cultures not obtained prior to antibiotics, available urine cultures reveal no growth, suggestive UTI susceptible to Rocephin   Patient to complete antibiotic course with Ceftin #3/4, Abx day #3/7  Will monitor CBC/vital signs         Other dysphagia   Assessment & Plan     Speech therapy evaluated and diet adjusted to pureed/nectar thickened liquids  Will obtain portable chest xray to r/o aspiration          Hypokalemia   Assessment & Plan     Resolved with replacement  Monitor BMP             Mild protein-calorie malnutrition (HCC)   Assessment & Plan     Malnutrition Findings:   Malnutrition type: Chronic illness  Degree of Malnutrition: Malnutrition of mild degree        Urinary retention   Assessment & Plan     Likely secondary to BPH  Urology input appreciated  To f/u with Urology outpatient for voiding trial          Dementia with behavioral disturbance   Assessment & Plan     Will discuss with case management regarding discharge planning  Updated contact          MARQUISE (acute kidney injury) ([facility] )   Assessment & Plan     Resolved with Cr near baseline  Monitor daily BMP  Avoid nephrotoxins      The patient will continue to require additional inpatient hospital stay due to need for close monitoring, potential procedures (EGD/colonoscopy)             Discharge Plan:    STR

## 2018-05-15 NOTE — PHYSICAL THERAPY NOTE
PT EVALUATION    80 y o     [id number]    Septic shock (HCC) [A41 9, R65 21]    Past Medical History:   Diagnosis Date    Cardiac disease     COPD (chronic obstructive pulmonary disease) ([facility] [address] )     Coronary artery disease     Hyperlipidemia     Hypertension          No past surgical history on file  05/15/18 1222   Note Type   Note type Eval only   Pain Assessment   Pain Assessment No/denies pain   Home Living   Type of Home Assisted living  ([facility] chart)   Home Layout One level   Home Equipment (unknown, pt unable to report)   Additional Comments Pt extremely confused and poor historian 2* same  Unknown PLOF  Per chart from [facility]   Prior Function   Level of [place] Needs assistance with ADLs and functional mobility   Lives With Facility staff   Receives Help From Personal care attendant   ADL Assistance (Appears at this time would require assistance )   IADLs Needs assistance   Falls in the last 6 months (unknown)   Restrictions/Precautions   Weight Bearing Precautions Per Order No   Other Precautions Cognitive; Chair Alarm; Restraints; Impulsive;Multiple lines;Telemetry;O2;Fall Risk   General   Additional Pertinent History Pt is 81 y/o male admitted with severe sepsis from [facility]  Transferred from [facility] 2* sepsis, difficulty cantrell placement requiring urology consult  Also with heme +stool and GI following  PT consulted  Up wiht assist    Family/Caregiver Present No   Cognition   Overall Cognitive Status Impaired   Arousal/Participation Alert   Orientation Level Oriented to person   Following Commands Follows one step commands with increased time or repetition   Comments Frequent redirection needed to tasks  RUE Assessment   RUE Assessment WFL  (grossly at least 3+/5)   LUE Assessment   LUE Assessment WFL  (grossly at least 3+/5)   RLE Assessment   RLE Assessment WFL  (grossly 3/5  difficult to assess 2* cognition )   LLE Assessment   LLE Assessment WFL  (grossly 3/5  Difficult to assess 2* cognition )   Bed Mobility   Supine to Sit 2  Maximal assistance   Additional items Assist x 2; Increased time required;Verbal cues;LE management   Additional Comments EOb sitting x 10 minutes  Attempts several times to return self to supine without warning  Transfers   Sit to Stand 3  Moderate assistance   Additional items Assist x 2; Increased time required;Verbal cues   Stand to Sit 3  Moderate assistance   Additional items Assist x 2; Increased time required;Verbal cues   Additional Comments Increased time and cues needed for transfers  Ambulation/Elevation   Gait pattern Decreased foot clearance; Improper Weight shift;Retropulsion; Short stride; Shuffling   Gait Assistance 3  Moderate assist   Additional items Assist x 2;Verbal cues; Tactile cues   Assistive Device Rolling walker   Distance 3'x1 from bed to chair  Increased direction needed with RW managment assistance  Balance   Static Sitting Fair   Dynamic Sitting Fair -   Static Standing Poor   Dynamic Standing Poor -   Ambulatory Poor -   Endurance Deficit   Endurance Deficit Yes   Endurance Deficit Description Fatigue, weakness  + -125  /80 OOB in chair  98% on 3L   Activity Tolerance   Activity Tolerance Patient limited by fatigue; Other (Comment); Treatment limited secondary to medical complications (Comment)  (cognition  HR)   Medical Staff Made Aware Nurse Robert Cortez  Nurse Made Aware yes, present  Assessment   Prognosis Fair   Problem List Decreased strength;Decreased endurance; Impaired balance;Decreased mobility; Decreased cognition; Impaired judgement;Decreased safety awareness   Assessment Pt is 79 y/o male admitted with severe sepsis, urinary retention with diffficult cantrell placement at [facility] requiring transfer given need for critical care management and urology consult for cantrell placement   + MARQUISE with obstructive uropathy  GI consulted 2* heme + stool  PT consulted for mobilty and is up wiht assistance  REsides at [facility]  Pt is poor historian and unable to report baseline mobility  Currently presents with impairments in strength, balance, activity tolerance and overall mobiltiy  Requires max A of 2 for bed mobilty  ModA of 2 for transfers and ambulation with RW support  Pt oriented to self and requires frequent redirection as well as verbal and tactile cues to complete mobiltiy tasks  Given current level of function will benefit from ongoing PT in order to optimzie functional outcomes while lessening caregiver assistance  At this time feel may benefit from STR given need for Ax2 for all mobilty  PT will follow and progress in order to optimize outcomes  OOB in chair with alarm in place and nsg at bedside to assist with meal    Barriers to Discharge Comments cognition  Goals   Patient Goals none stated   STG Expiration Date 05/25/18   Short Term Goal #1 10 days:  Bed mobiltiy with Mary  Transfers with Mary  Amb with LRAD > 150'x1 with Mary  Improve overall strength and balance 1/2 grade  Increaes activity tolerance to 45 minutes  Revise goals prn  Treatment Day 0   Plan   Treatment/Interventions Functional transfer training;LE strengthening/ROM; Therapeutic exercise; Endurance training;Patient/family training;Equipment eval/education; Bed mobility;Gait training; Compensatory technique education;Continued evaluation;Spoke to nursing;Spoke to MD;OT   PT Frequency Other (Comment)  (4-5 times per week )   Recommendation   Recommendation Short-term skilled PT   Equipment Recommended Walker   PT - OK to Discharge (yes to rhb when medically stable )   Modified Willacy Scale   Modified Willacy Scale 4   Barthel Index   Feeding 5   Bathing 0   Grooming Score 0   Dressing Score 0   Bladder Score 0   Bowels Score 5   Toilet Use Score 5   Transfers (Bed/Chair) Score 5   Mobility (Level Surface) Score 0   Stairs Score 0   Barthel Index Score 20     History: co - morbidities, fall risk, use of assistive device, assist for adl's, cognition, multiple lines, chair/bed alarm  Exam: impairments in locomotion, musculoskeletal, balance,posture,  cardiac, pulmonary, cognition, barthel 20, modified aura 4  Clinical: unstable/unpredictable ( high fall risk, cognition, chair/bed alarm, Ax2)  Complexity:high    Efra Borden, PT

## 2018-05-15 NOTE — ASSESSMENT & PLAN NOTE
Urine cultures not obtained prior to antibiotics, available urine cultures reveal no growth, suggestive UTI susceptible to Rocephin   Patient to complete antibiotic course with Ceftin #3/4, Abx day #3/7  Will monitor CBC/vital signs

## 2018-05-15 NOTE — ASSESSMENT & PLAN NOTE
Speech therapy evaluated and diet adjusted to pureed/nectar thickened liquids  Will obtain portable chest xray to r/o aspiration

## 2018-05-15 NOTE — PLAN OF CARE
Problem: PHYSICAL THERAPY ADULT  Goal: Performs mobility at highest level of function for planned discharge setting  See evaluation for individualized goals  Treatment/Interventions: Functional transfer training, LE strengthening/ROM, Therapeutic exercise, Endurance training, Patient/family training, Equipment eval/education, Bed mobility, Gait training, Compensatory technique education, Continued evaluation, Spoke to nursing, Spoke to MD, OT  Equipment Recommended: Abi Jain       See flowsheet documentation for full assessment, interventions and recommendations  Prognosis: Fair  Problem List: Decreased strength, Decreased endurance, Impaired balance, Decreased mobility, Decreased cognition, Impaired judgement, Decreased safety awareness  Assessment: Pt is 81 y/o male admitted with severe sepsis, urinary retention with diffficult cantrell placement at [facility] requiring transfer given need for critical care management and urology consult for cantrell placement   + MARQUISE with obstructive uropathy  GI consulted 2* heme + stool  PT consulted for mobilty and is up wiht assistance  REsides at [facility]  Pt is poor historian and unable to report baseline mobility  Currently presents with impairments in strength, balance, activity tolerance and overall mobiltiy  Requires max A of 2 for bed mobilty  ModA of 2 for transfers and ambulation with RW support  Pt oriented to self and requires frequent redirection as well as verbal and tactile cues to complete mobiltiy tasks  Given current level of function will benefit from ongoing PT in order to optimzie functional outcomes while lessening caregiver assistance  At this time feel may benefit from STR given need for Ax2 for all mobilty  PT will follow and progress in order to optimize outcomes  OOB in chair with alarm in place and nsg at bedside to assist with meal      Barriers to Discharge Comments: cognition    Recommendation: Short-term skilled PT     PT - OK to Discharge:  (yes to rhb when medically stable )    See flowsheet documentation for full assessment

## 2018-05-15 NOTE — ASSESSMENT & PLAN NOTE
Patient was initially in septic shock requiring pressors now resolved  Secondary to UTI, no urine cultures available, repeat cultures no growth suggesting Rocephin effective  Continue Ceftin for total of 7 days antibiotics, day 3/4 Ceftin  Monitor CBC/VS

## 2018-05-15 NOTE — ASSESSMENT & PLAN NOTE
Likely secondary to [facility]  Urology input appreciated  To f/u with Urology outpatient for voiding trial

## 2018-05-15 NOTE — PLAN OF CARE
Problem: OCCUPATIONAL THERAPY ADULT  Goal: Performs self-care activities at highest level of function for planned discharge setting  See evaluation for individualized goals  Treatment Interventions: ADL retraining, Functional transfer training, UE strengthening/ROM, Endurance training, Cognitive reorientation, Patient/family training, Equipment evaluation/education, Compensatory technique education, Activityengagement, Energy conservation          See flowsheet documentation for full assessment, interventions and recommendations  Limitation: Decreased ADL status, Decreased UE strength, Decreased Safe judgement during ADL, Decreased cognition, Decreased endurance, Decreased self-care trans, Decreased high-level ADLs  Prognosis: Fair  Assessment: Pt is a 80 y o  male seen for OT evaluation s/p admit to [address] on 5/11/2018 w/ Sepsis ([facility] [address] )  Comorbidities affecting pt's functional performance at time of assessment include: a-fib, CAD, SKI, dysphagia, malnutrition, cantrell placement, heme stool and GI consulted; Dementia w/ behavioral disturbances  Personal factors affecting pt at time of IE include: impaired cognition (poor historian, behaviors and pt w/ frequent verbal outbursts)  Prior to admission, [facility] at [facility] and pt unable to provides history and PLOF   Upon evaluation: Pt requires MAX assist x2  Supine>sit bed mobility (multiple attempts to lean backwards onto bed), MOD assist x2 sit<>stand w/ VCs for positioning and safety, MOD assist x2 functional mobility w/ RW w/ VCs for safety and encouragement, MAX assist ADLs, MAX assist self-feeding (provided pt w/ utensil and he stated "What do I do know") 2* the following deficits impacting occupational performance: decreased strength and endurance, impaired balance, impaired activity tolerance, impaired functional reach, impaired cognition (verbal outbursts, oriented to self own, impaired insight, impaired safety awareness, requires frequent redirection to tasks and VCs t/o activity)  Pt to benefit from continued skilled OT tx while in the hospital to address deficits as defined above and maximize level of functional independence w ADL's and functional mobility  Occupational Performance areas to address include: grooming, bathing/shower, toilet hygiene, dressing, functional mobility and clothing management, formal cognitive assessment, b/l UE exercises  From OT standpoint, recommendation at time of d/c would be short term rehab       OT Discharge Recommendation: Short Term Rehab  OT - OK to Discharge:  (to rehab when medically stable)      Comments: Glenn Partida MS, OTR/L

## 2018-05-15 NOTE — OCCUPATIONAL THERAPY NOTE
[address] Evaluation     Patient Name: Israel Briseno  Today's Date: 5/15/2018  Problem List  Patient Active Problem List   Diagnosis    [facility] fibrillation ([facility] )    Mitral regurgitation    Cognitive decline    MARQUISE (acute kidney injury) ([facility] )    Dementia with behavioral disturbance    PAT (paroxysmal atrial tachycardia) ([facility] )    Coronary artery disease involving native coronary artery of native heart    Hypertension    Sepsis ([place] [address] )    Urinary retention    Mild protein-calorie malnutrition ([place] [address] )    Hypokalemia    Other dysphagia    Acute cystitis with hematuria    Macrocytic anemia    Sinus tachycardia     Past Medical History  Past Medical History:   Diagnosis Date    Cardiac disease     COPD (chronic obstructive pulmonary disease) ([place] [address] )     Coronary artery disease     Hyperlipidemia     Hypertension      Past Surgical History  No past surgical history on file  05/15/18 1221   Note Type   Note type Eval/Treat   Restrictions/Precautions   Weight Bearing Precautions Per Order No   Other Precautions Cognitive; Chair Alarm; Bed Alarm; Fall Risk;Pain;O2;Multiple lines;Telemetry   Pain Assessment   Pain Assessment No/denies pain   Pain Score No Pain   Home Living   Type of Home Assisted [facility]  ([facility])   Home Layout One level   Home Equipment (unknown)   Additional Comments pt is a poor historian and confused; pt not oriented; pt unable to provide PLOF; RN [facility]   Prior Function   Level of [place] Needs assistance with IADLs; Needs assistance with ADLs and functional mobility   Lives With Facility staff   Receives Help From Personal care attendant   ADL Assistance (appears as pt would need assist; assist to self-feed)   IADLs Needs assistance   Falls in the last 6 months (unknown)   Vocational Retired   Comments pt is a poor historian asking "I don't know what to do"   Lifestyle   Autonomy pt is a poor historian unable to provide PLOF; most likely required assist at baseline based on evaluation   Reciprocal Relationships facility staff   Service to Others retired unable to report   Intrinsic Gratification watching baseball   ADL   Where Assessed Chair   Eating Assistance 2  Maximal Assistance   Grooming Assistance 2  Maximal Assistance   UB Bathing Assistance 2  Maximal Assistance   LB Bathing Assistance 2  Maximal Assistance   UB Dressing Assistance 2  Maximal [address] 2  Maximal [address]  2  Maximal Assistance   Bed Mobility   Supine to Sit 2  Maximal assistance   Additional items Assist x 2; Increased time required;LE management;Verbal cues; Bedrails;HOB elevated   Sit to Supine 2  Maximal assistance   Additional Comments VCs for positioning and safety awareness; pt w/ attempting to return self to bed multiple times   Transfers   Sit to Stand 3  Moderate assistance   Additional items Assist x 2; Increased time required;Verbal cues   Stand to Sit 3  Moderate assistance   Additional items Assist x 2; Increased time required;Verbal cues;Armrests   Stand pivot 3  Moderate assistance   Additional items Assist x 2; Increased time required;Verbal cues   Additional Comments VCs for positioning and safety   Functional Mobility   Functional Mobility 3  Moderate assistance   Additional Comments assist x2 w/ increased time and MAX cues for safety   Additional items Rolling walker   Balance   Static Sitting Fair   Dynamic Sitting Fair -   Static Standing Poor   Dynamic Standing Poor -   Ambulatory Poor -   Activity Tolerance   Activity Tolerance Patient limited by fatigue;Treatment limited secondary to medical complications (Comment)  (cognition, tacycardia)   Nurse Made Aware appropriate to see per Elena CLOUD   RULUCILLE Assessment   RUE Assessment WFL  (grossly 3+/5,  3+/5)   LUE Assessment   LUE Assessment WFL  (grossly 3+/5,  3+/5)   Hand Function   Gross Motor Coordination Functional   Fine Motor Coordination Functional   Sensation   Light Touch Not tested  (unable to test 2* cognition)   Vision-Basic Assessment   Current Vision No visual deficits   Vision - Complex Assessment   Ocular Range of Motion [facility]   Perception   Motor Planning Hand over hand to sequence tasks   Perseveration Perseverates during conversation   Cognition   Overall Cognitive Status Impaired   Arousal/Participation Responsive   Attention Difficulty dividing attention   Orientation Level Oriented to person   Memory Decreased recall of precautions;Decreased recall of recent events;Decreased short term memory;Decreased recall of biographical information;Decreased long term memory   Following Commands Follows one step commands with increased time or repetition   Comments impaired insight and safety awareness, pt oriented to self, pt requires frequent redirection to tasks, pt w/ frequent obrusts "I don't know what I'm doing, what should I do"   Assessment   Limitation Decreased ADL status; Decreased UE strength;Decreased Safe judgement during ADL;Decreased cognition;Decreased endurance;Decreased self-care trans;Decreased high-level ADLs   Prognosis Fair   Assessment Pt is a 80 y o  male seen for OT evaluation s/p admit to [facility]  on 5/11/2018 w/ Sepsis ([facility] [address] )  Comorbidities affecting pt's functional performance at time of assessment include: a-fib, CAD, SKI, dysphagia, malnutrition, cantrell placement, heme stool and GI consulted; Dementia w/ behavioral disturbances  Personal factors affecting pt at time of IE include: impaired cognition (poor historian, behaviors and pt w/ frequent verbal outbursts)  Prior to admission, [facility] was at [facility] and pt unable to provides history and PLOF; upon assessment pt most likely required assist for ADLs and functional transfers and mobility   Upon evaluation: Pt requires MAX assist x2  Supine>sit bed mobility (multiple attempts to lean backwards onto bed), MOD assist x2 sit<>stand w/ VCs for positioning and safety, MOD assist x2 functional mobility w/ RW w/ VCs for safety and encouragement, MAX assist ADLs, MAX assist self-feeding (provided pt w/ utensil and he stated "What do I do know") 2* the following deficits impacting occupational performance: decreased strength and endurance, impaired balance, impaired activity tolerance, impaired functional reach, impaired cognition (verbal outbursts, oriented to self own, impaired insight, impaired safety awareness, requires frequent redirection to tasks and VCs t/o activity)  Pt to benefit from continued skilled OT tx while in the hospital to address deficits as defined above and maximize level of functional independence w ADL's and functional mobility  Occupational Performance areas to address include: grooming, bathing/shower, toilet hygiene, dressing, functional mobility and clothing management, formal cognitive assessment, b/l UE exercises  From OT standpoint, recommendation at time of d/c would be short term rehab  Patient Goals none expressed   LTG Time Frame 10-14   Long Term Goal please see below goals   Plan   Treatment Interventions ADL retraining;Functional transfer training;UE strengthening/ROM; Endurance training;Cognitive reorientation;Patient/family training;Equipment evaluation/education; Compensatory technique education; Activityengagement; Energy conservation   Goal Expiration Date 05/29/18   OT Frequency 3-5x/wk   Recommendation   OT Discharge Recommendation Short Term Rehab   OT - OK to Discharge (to rehab when medically stable)   Barthel Index   Feeding 5   Bathing 0   Grooming Score 0   Dressing Score 0   Bladder Score 0   Bowels Score 5   Toilet Use Score 5   Transfers (Bed/Chair) Score 5   Mobility (Level Surface) Score 0   Stairs Score 0   Barthel Index Score 20   Modified Keith Scale   Modified Keith Scale 4     Occupational Therapy Goals to be met in 10-14 days:  1) Pt will improve activity tolerance to G for min 30 min txment sessions  2) Pt will complete UB ADLs/self care w/ min assist and mod assist LB ADLs  3) Pt will complete toileting w/ min assist w/ G hygiene/thoroughness using DME PRN  4) Pt will improve functional transfers on/off all surfaces using DME PRN w/ G balance/safety including toileting w/ mod assist  5) Pt will improve fx'l mobility during I/ADl/leisure tasks using DME PRN w/ g balance/safety w/ mod assist  6) Pt will engage in ongoing cognitive assessment w/ G participation to A w/ safe d/c planning/recommendations  7) Pt will demonstrate G carryover of pt/caregiver education and training as appropriate w/ mod I  w/ G tolerance  8) Pt will engage in depression screen/leisure interest checklist w/ G participation to monitor s/s depression and ID 3 positive coping strategies to A w/ emotional regulation and management  9) Pt will tolerate bed mobility and EOB seated tasks w/ min A for 30 mins to engage in fx'l I/ADL/leisure tasks w/ min A w/ min cues  10) Pt will engage in activity configuration activity w/ G participation and mod I to increase time management skills and improve participation in a structured routine to improve overall quality of life  11) Pt will demonstrate improved b/l UE strength by 1 MMT grade to enhance ADLS and functional transfers   12) Pt will follow 75% simple 1 step commands and be A&Ox 3 t/o OT sessions w/ environmental cues  13) Pt will sit upright for all meals w/ MIN assist for self-feeding      Documentation completed by: Martha Whiteside MS, OTR/L

## 2018-05-15 NOTE — PROGRESS NOTES
Progress Note - Chi Johnson 80 y o  male MRN: [id number]    Unit/Bed#: E5 -01 Encounter: [id number]         Assessment/ Plan:  Anemia  Dysphagia     1  Anemia - Pt was admitted with sepsis, from urinary source & found to have decreased hbg  Hbg decreased again, currently 9 3  Per nurse dark stool  He will likely need EGD & possible colonoscopy for further eval however given his respiratory status he would be high risk  Also will need consent for POA  I will discuss this further with SLIM regarding timing of procedures    -Follow H&H     2  Oropharyngeal dysphagia - he has a long hx of dysphagia  He has been followed by speech  He is currently on pureed diet  He is having worsening respiratory decline & there is a concern for possible aspiration  CXR pending    -Follow-up CXR         Subjective:   Pt seen & examined  No complaints  Objective:     Vitals: Blood pressure 135/84, pulse (!) 117, temperature 99 °F (37 2 °C), temperature source Temporal, resp  rate 18, height 5' 4" (1 626 m), weight 60 3 kg (132 lb 15 oz), SpO2 90 %  ,Body mass index is 22 82 kg/m²  Physical Exam: General appearance: alert, awake, demented  Lungs: wheezes and on 2L's NC  Heart: regular rate and rhythm  Abdomen: soft, non-tender; bowel sounds normal; no masses,  no organomegaly  Skin: Skin color, texture, turgor normal  No rashes or lesions     Invasive Devices     Peripheral Intravenous Line            Peripheral IV 05/13/18 Right Antecubital 2 days          Drain            Urethral Catheter Latex 16 Fr  3 days                Lab, Imaging and other studies: I have personally reviewed pertinent reports       CBC:   Lab Results   Component Value Date    WBC 12 14 (H) 05/15/2018    HGB 9 3 (L) 05/15/2018    HCT 28 9 (L) 05/15/2018     (H) 05/15/2018     05/15/2018    MCH 32 9 05/15/2018    MCHC 32 2 05/15/2018    RDW 18 3 (H) 05/15/2018    MPV 10 1 05/15/2018    NRBC 0 05/15/2018   ,   CMP:   Lab Results Component Value Date     (H) 05/15/2018    K 3 8 05/15/2018     (H) 05/15/2018    CO2 24 05/15/2018    ANIONGAP 13 05/15/2018    BUN 32 (H) 05/15/2018    CREATININE 1 24 05/15/2018    GLUCOSE 129 05/15/2018    CALCIUM 8 7 05/15/2018    AST 35 05/15/2018    ALT 47 05/15/2018    ALKPHOS 70 05/15/2018    PROT 7 6 05/15/2018    BILITOT 0 98 05/15/2018    EGFR 53 05/15/2018   ,

## 2018-05-15 NOTE — PLAN OF CARE
GENITOURINARY - ADULT     Maintains or returns to baseline urinary function Progressing     Absence of urinary retention Progressing        HEMATOLOGIC - ADULT     Maintains hematologic stability Progressing        METABOLIC, FLUID AND ELECTROLYTES - ADULT     Electrolytes maintained within normal limits Progressing     Fluid balance maintained Progressing        MUSCULOSKELETAL - ADULT     Maintain or return mobility to safest level of function Progressing        Nutrition/Hydration-ADULT     Nutrient/Hydration intake appropriate for improving, restoring or maintaining nutritional needs Progressing        Potential for Falls     Patient will remain free of falls Progressing        Prexisting or High Potential for Compromised Skin Integrity     Skin integrity is maintained or improved Progressing        SKIN/TISSUE INTEGRITY - ADULT     Skin integrity remains intact Progressing     Oral mucous membranes remain intact Progressing

## 2018-05-15 NOTE — PROGRESS NOTES
Progress Note - Rahat Leone 1934, 80 y o  male MRN: [id number]    Unit/Bed#: E5 -01 Encounter: [id number]    Primary Care Provider: Jaja White MD   Date and time admitted to hospital: 5/11/2018  9:36 AM        * Sepsis [facility])   Assessment & Plan    Patient was initially in septic shock requiring pressors now resolved  Secondary to UTI, no urine cultures available, repeat cultures no growth suggesting Rocephin effective  Continue Ceftin for total of 7 days antibiotics, day 3/4 Ceftin  Monitor CBC/VS          Sinus tachycardia   Assessment & Plan    May be due to acute on chronic anemia vs due to infection  Will obtain EKG  Place on gentle IV fluids        Macrocytic anemia   Assessment & Plan    Along with heme-positive stools, likely multifactorial anemia with workup consisted with possible MDS as well as anemia of chronic disease  Patient's H&H continues to drop warranting consideration for EGD/colonoscopy, GI input appreciated  Monitor CBC daily and transfuse for Hb<7        Acute cystitis with hematuria   Assessment & Plan    Urine cultures not obtained prior to antibiotics, available urine cultures reveal no growth, suggestive UTI susceptible to Rocephin   Patient to complete antibiotic course with Ceftin #3/4, Abx day #6/7  Will monitor CBC/vital signs        Other dysphagia   Assessment & Plan    Speech therapy evaluated and diet adjusted to pureed/nectar thickened liquids  Will obtain portable chest xray to r/o aspiration        Hypokalemia   Assessment & Plan    Resolved with replacement  Monitor BMP          Mild protein-calorie malnutrition (HCC)   Assessment & Plan    Malnutrition Findings:   Malnutrition type: Chronic illness  Degree of Malnutrition: Malnutrition of mild degree    BMI Findings: Body mass index is 22 82 kg/m²     Nutrition consult        Urinary retention   Assessment & Plan    Likely secondary to [facility]  Urology input appreciated  To f/u with Urology outpatient for voiding trial        Dementia with behavioral disturbance   Assessment & Plan    Will discuss with case management regarding discharge planning  Updated contact        MARQUISE (acute kidney injury) ([facility] [address] )   Assessment & Plan    Resolved with Cr near baseline  Monitor daily BMP  Avoid nephrotoxins          VTE Pharmacologic Prophylaxis:   Pharmacologic: None due to anemia  Mechanical VTE Prophylaxis in Place: Yes    Patient Centered Rounds: I have performed bedside rounds with nursing staff today  Discussions with Specialists or Other Care Team Provider: Speech therapy, GI    Education and Discussions with Family / Patient: Patient's friend    Time Spent for Care: 30 minutes  More than 50% of total time spent on counseling and coordination of care as described above  Current Length of Stay: 4 day(s)    Current Patient Status: Inpatient   Certification Statement: The patient will continue to require additional inpatient hospital stay due to need for close monitoring, potential procedures (EGD/colonoscopy)    Discharge Plan: TBD     Code Status: Level 3 - DNAR and DNI      Subjective:   Patient seen and examined  There were no overnight events  She denies any pain  He is a poor historian and unable to provide details  Per nursing patient has been having coarse lung sounds throughout the day  The patient has not appeared short of breath  Objective:     Vitals:   Temp (24hrs), Av °F (37 2 °C), Min:98 9 °F (37 2 °C), Max:99 1 °F (37 3 °C)    HR:  [117-120] 117  Resp:  [18-20] 18  BP: (135-156)/(79-93) 135/84  SpO2:  [90 %-92 %] 90 %  Body mass index is 22 82 kg/m²  Input and Output Summary (last 24 hours): Intake/Output Summary (Last 24 hours) at 05/15/18 1145  Last data filed at 05/15/18 0453   Gross per 24 hour   Intake                0 ml   Output             1225 ml   Net            -1225 ml       Physical Exam:     Physical Exam   Constitutional: No distress     HENT:   Head: Normocephalic and atraumatic  Eyes: Conjunctivae are normal    Neck: No JVD present  Cardiovascular: Normal rate and regular rhythm  Pulmonary/Chest: Effort normal  No respiratory distress  He has no wheezes  He has rales (coarse throughout)  Abdominal: Soft  He exhibits no distension  There is no tenderness  There is no guarding  Musculoskeletal: He exhibits no edema  Neurological: He is alert  No cranial nerve deficit  Skin: Skin is warm and dry  Psychiatric: His speech is rapid and/or pressured  He is slowed  Cognition and memory are impaired  Additional Data:     Labs:      Results from last 7 days  Lab Units 05/15/18  0514   WBC Thousand/uL 12 14*   HEMOGLOBIN g/dL 9 3*   HEMATOCRIT % 28 9*   PLATELETS Thousands/uL 187   NEUTROS PCT % 89*   LYMPHS PCT % 7*   MONOS PCT % 4   EOS PCT % 0       Results from last 7 days  Lab Units 05/15/18  0514   SODIUM mmol/L 148*   POTASSIUM mmol/L 3 8   CHLORIDE mmol/L 111*   CO2 mmol/L 24   BUN mg/dL 32*   CREATININE mg/dL 1 24   CALCIUM mg/dL 8 7   TOTAL PROTEIN g/dL 7 6   BILIRUBIN TOTAL mg/dL 0 98   ALK PHOS U/L 70   ALT U/L 47   AST U/L 35   GLUCOSE RANDOM mg/dL 129                     * I Have Reviewed All Lab Data Listed Above  * Additional Pertinent Lab Tests Reviewed: Z[address] [facility] Admission Reviewed    Imaging:    Imaging Reports Reviewed Today Include: None today, portable CXR pending    Recent Cultures (last 7 days):       Results from last 7 days  Lab Units 05/11/18  1511 05/11/18  1137 05/11/18  1132   BLOOD CULTURE   --  No Growth at 72 hrs  No Growth at 72 hrs     URINE CULTURE  No Growth <1000 cfu/mL  --   --        Last 24 Hours Medication List:     Current Facility-Administered Medications:  acetaminophen 650 mg Oral Q6H PRN MICHAEL Patiño   atorvastatin 20 mg Oral Daily With MICHAEL Boyer   cefuroxime 500 mg Oral Q12H [address], CRNP   docusate sodium 100 mg Oral BID MICHAEL Patiño   escitalopram 10 mg Oral Daily Gracie Kanika, CRNP   ipratropium 0 5 mg Nebulization Q8H PRN Tu Best MD   levalbuterol 1 25 mg Nebulization Q8H PRN Gracie Deyine, CRNP   levothyroxine 75 mcg Oral Early Morning Tu Best MD   lidocaine 1 patch Transdermal Daily Gracie Kanika, CRNP   pantoprazole 40 mg Oral Daily PRN Gracie Kanika, CRNP   pantoprazole 40 mg Intravenous Q12H [address] Zadie Bedford Spatzer, MICHAEL   QUEtiapine 25 mg Oral BID Gracie Kanika, CRNP   tamsulosin 0 4 mg Oral Daily With Willie Lemus MD        Today, Patient Was Seen By: Nick Silveira MD    ** Please Note: Dictation voice to text software may have been used in the creation of this document   **

## 2018-05-15 NOTE — ASSESSMENT & PLAN NOTE
Along with heme-positive stools, likely multifactorial anemia with workup consisted with possible MDS as well as anemia of chronic disease  Patient's H&H continues to drop warranting consideration for EGD/colonoscopy, GI input appreciated  Monitor CBC daily and transfuse for Hb<7

## 2018-05-15 NOTE — ASSESSMENT & PLAN NOTE
May be due to acute on chronic anemia vs due to infection  Will obtain EKG  Place on gentle IV fluids

## 2018-05-16 PROBLEM — E87.0 ACUTE HYPERNATREMIA: Status: ACTIVE | Noted: 2018-05-16

## 2018-05-16 LAB
ANION GAP SERPL CALCULATED.3IONS-SCNC: 12 MMOL/L (ref 4–13)
BACTERIA BLD CULT: NORMAL
BACTERIA BLD CULT: NORMAL
BASOPHILS # BLD AUTO: 0.02 THOUSANDS/ΜL (ref 0–0.1)
BASOPHILS NFR BLD AUTO: 0 % (ref 0–1)
BUN SERPL-MCNC: 37 MG/DL (ref 5–25)
CALCIUM SERPL-MCNC: 8.7 MG/DL (ref 8.3–10.1)
CHLORIDE SERPL-SCNC: 115 MMOL/L (ref 100–108)
CO2 SERPL-SCNC: 25 MMOL/L (ref 21–32)
CREAT SERPL-MCNC: 1.1 MG/DL (ref 0.6–1.3)
EOSINOPHIL # BLD AUTO: 0.11 THOUSAND/ΜL (ref 0–0.61)
EOSINOPHIL NFR BLD AUTO: 1 % (ref 0–6)
ERYTHROCYTE [DISTWIDTH] IN BLOOD BY AUTOMATED COUNT: 18.2 % (ref 11.6–15.1)
GFR SERPL CREATININE-BSD FRML MDRD: 62 ML/MIN/1.73SQ M
GLUCOSE SERPL-MCNC: 118 MG/DL (ref 65–140)
HCT VFR BLD AUTO: 28.3 % (ref 36.5–49.3)
HGB BLD-MCNC: 9 G/DL (ref 12–17)
IMM GRANULOCYTES # BLD AUTO: 0.06 THOUSAND/UL (ref 0–0.2)
IMM GRANULOCYTES NFR BLD AUTO: 1 % (ref 0–2)
LYMPHOCYTES # BLD AUTO: 0.64 THOUSANDS/ΜL (ref 0.6–4.47)
LYMPHOCYTES NFR BLD AUTO: 7 % (ref 14–44)
MCH RBC QN AUTO: 33 PG (ref 26.8–34.3)
MCHC RBC AUTO-ENTMCNC: 31.8 G/DL (ref 31.4–37.4)
MCV RBC AUTO: 104 FL (ref 82–98)
MONOCYTES # BLD AUTO: 0.35 THOUSAND/ΜL (ref 0.17–1.22)
MONOCYTES NFR BLD AUTO: 4 % (ref 4–12)
NEUTROPHILS # BLD AUTO: 7.83 THOUSANDS/ΜL (ref 1.85–7.62)
NEUTS SEG NFR BLD AUTO: 88 % (ref 43–75)
NRBC BLD AUTO-RTO: 1 /100 WBCS
PLATELET # BLD AUTO: 197 THOUSANDS/UL (ref 149–390)
PMV BLD AUTO: 10.2 FL (ref 8.9–12.7)
POTASSIUM SERPL-SCNC: 3.6 MMOL/L (ref 3.5–5.3)
RBC # BLD AUTO: 2.73 MILLION/UL (ref 3.88–5.62)
SODIUM SERPL-SCNC: 152 MMOL/L (ref 136–145)
WBC # BLD AUTO: 8.95 THOUSAND/UL (ref 4.31–10.16)

## 2018-05-16 PROCEDURE — 80048 BASIC METABOLIC PNL TOTAL CA: CPT | Performed by: FAMILY MEDICINE

## 2018-05-16 PROCEDURE — 97535 SELF CARE MNGMENT TRAINING: CPT

## 2018-05-16 PROCEDURE — 97530 THERAPEUTIC ACTIVITIES: CPT

## 2018-05-16 PROCEDURE — 97112 NEUROMUSCULAR REEDUCATION: CPT

## 2018-05-16 PROCEDURE — 92526 ORAL FUNCTION THERAPY: CPT

## 2018-05-16 PROCEDURE — 85025 COMPLETE CBC W/AUTO DIFF WBC: CPT | Performed by: FAMILY MEDICINE

## 2018-05-16 PROCEDURE — 99232 SBSQ HOSP IP/OBS MODERATE 35: CPT | Performed by: INTERNAL MEDICINE

## 2018-05-16 PROCEDURE — 99232 SBSQ HOSP IP/OBS MODERATE 35: CPT | Performed by: FAMILY MEDICINE

## 2018-05-16 PROCEDURE — C9113 INJ PANTOPRAZOLE SODIUM, VIA: HCPCS | Performed by: NURSE PRACTITIONER

## 2018-05-16 RX ORDER — AMLODIPINE BESYLATE 5 MG/1
5 TABLET ORAL DAILY
Status: DISCONTINUED | OUTPATIENT
Start: 2018-05-16 | End: 2018-05-16

## 2018-05-16 RX ORDER — LOSARTAN POTASSIUM 50 MG/1
50 TABLET ORAL DAILY
Status: DISCONTINUED | OUTPATIENT
Start: 2018-05-16 | End: 2018-05-18

## 2018-05-16 RX ORDER — DEXTROSE MONOHYDRATE 50 MG/ML
100 INJECTION, SOLUTION INTRAVENOUS CONTINUOUS
Status: DISCONTINUED | OUTPATIENT
Start: 2018-05-16 | End: 2018-05-19

## 2018-05-16 RX ADMIN — CEFUROXIME AXETIL 500 MG: 250 TABLET ORAL at 09:17

## 2018-05-16 RX ADMIN — DEXTROSE 50 ML/HR: 5 SOLUTION INTRAVENOUS at 09:17

## 2018-05-16 RX ADMIN — LOSARTAN POTASSIUM 50 MG: 50 TABLET, FILM COATED ORAL at 11:49

## 2018-05-16 RX ADMIN — QUETIAPINE FUMARATE 25 MG: 25 TABLET ORAL at 17:48

## 2018-05-16 RX ADMIN — CEFUROXIME AXETIL 500 MG: 250 TABLET ORAL at 22:10

## 2018-05-16 RX ADMIN — DOCUSATE SODIUM 100 MG: 100 CAPSULE, LIQUID FILLED ORAL at 17:40

## 2018-05-16 RX ADMIN — ATORVASTATIN CALCIUM 20 MG: 20 TABLET, FILM COATED ORAL at 16:30

## 2018-05-16 RX ADMIN — TAMSULOSIN HYDROCHLORIDE 0.4 MG: 0.4 CAPSULE ORAL at 16:30

## 2018-05-16 RX ADMIN — QUETIAPINE FUMARATE 25 MG: 25 TABLET ORAL at 09:17

## 2018-05-16 RX ADMIN — PANTOPRAZOLE SODIUM 40 MG: 40 INJECTION, POWDER, FOR SOLUTION INTRAVENOUS at 22:10

## 2018-05-16 RX ADMIN — METOPROLOL TARTRATE 12.5 MG: 25 TABLET ORAL at 09:17

## 2018-05-16 RX ADMIN — DOCUSATE SODIUM 100 MG: 100 CAPSULE, LIQUID FILLED ORAL at 09:17

## 2018-05-16 RX ADMIN — LEVOTHYROXINE SODIUM 75 MCG: 75 TABLET ORAL at 05:07

## 2018-05-16 RX ADMIN — PANTOPRAZOLE SODIUM 40 MG: 40 INJECTION, POWDER, FOR SOLUTION INTRAVENOUS at 09:17

## 2018-05-16 RX ADMIN — METOPROLOL TARTRATE 12.5 MG: 25 TABLET ORAL at 22:10

## 2018-05-16 RX ADMIN — ESCITALOPRAM OXALATE 10 MG: 10 TABLET ORAL at 09:17

## 2018-05-16 NOTE — SPEECH THERAPY NOTE
Speech Language/Pathology    Speech/Language Pathology Progress Note    Patient Name: Miguel Lechuga  Today's Date: 5/16/2018     Problem List  Patient Active Problem List   Diagnosis    Rapid [facility] fibrillation ([facility] [address] )    Mitral regurgitation    Cognitive decline    MARQUISE (acute kidney injury) ([facility] [address] )    Dementia with behavioral disturbance    [facility]    Coronary artery disease involving native coronary artery of native heart    Essential hypertension    Sepsis ([facility] [address] )    Urinary retention    [facility]-cal[facility] malnutrition [facility]    Hypokalemia    Other dysphagia    Acute cystitis with hematuria    Macrocytic anemia    Sinus tachycardia    Acute hypernatremia        Past Medical History  Past Medical History:   Diagnosis Date    Cardiac disease     [facility]     Coronary artery disease     Hyperlipidemia     Hypertension         Past Surgical History  No past surgical history on file  Subjective:  "Hold my hand!" and "I don't know" in response to many questions  Objective:  Noted documentation of long-standing h/o dysphagia, however pt was on a regular diet at [facility]  Seen in the ICU 5/11 by another SLP and tolerating mechanical soft and thin wnl  Pt seen by me 5/14 and downgraded to puree and nectar  Pt does have a moderate sized hiatal hernia per CT  He sounds very congested and does not cough upon request  Nurse reported same, and that he can not use the spirometers  Oral care completed  Tolerated pudding and nectar thick liquids w/out overt s/s aspiration or changes in his current congestion  cxr 5/15:  FINDINGS:  There are median sternotomy wires indicating prior cardiac surgery  Transvenous pacemaker present as before  Heart shadow is mildly enlarged but unchanged from prior exam   There is increased density identified in the left midlung field    To some extent, this is likely related to a soft tissue fold  However, left perihilar infiltrate is not excluded  Right lung essentially clear with shallow depth of inspiration noted  There is no pneumothorax or significant effusion  IMPRESSION:  Shallow depth of inspiration and soft tissue fold projecting over the left hemithorax, however findings are still suspicious for a left perihilar infiltrate with follow-up recommended as clinically indicated  Assessment:  No overt s/s aspiration  He is unable to produce a volitional cough when cued  He is unable to uses the spirometers  Plan/Recommendations:  Continue puree w/ nectar for now  ST will f/u

## 2018-05-16 NOTE — PLAN OF CARE
Problem: OCCUPATIONAL THERAPY ADULT  Goal: Performs self-care activities at highest level of function for planned discharge setting  See evaluation for individualized goals  Treatment Interventions: ADL retraining, Functional transfer training, UE strengthening/ROM, Endurance training, Cognitive reorientation, Patient/family training, Equipment evaluation/education, Compensatory technique education, Activityengagement, Energy conservation          See flowsheet documentation for full assessment, interventions and recommendations  Outcome: Progressing  Limitation: Decreased ADL status, Decreased UE strength, Decreased Safe judgement during ADL, Decreased cognition, Decreased endurance, Decreased self-care trans, Decreased high-level ADLs  Prognosis: Fair  Assessment: Pt was seen for skilled OT with focus on completion of basic orientation, light grooming activity, functional mobility, and review of current plan of care  See above levels of A required for all functional tasks  Pt with difficulty following simple commands  Staff provided redirection through out tx session to address self care needs due to poor swallow noted  Reported findings to nursing staff  Encouraged Pt to expectorate phlegm while seated upright in bedside chair  Pt unable to follow simple commands and frequently called out stating, "just give me a minute", or, "I am"  Mod A x2 for SPT from bedside chair to EOB  Max A x2 to return to supine positioning  Moderate redirection and reassurance required through out tx session  Pt pleasant and thankful with return to supine positioning  Encouraged bed positioned as chair  Pt reports, "feels OK', regarding upright positioning of bed  Bed alarm activated upon termination of tx session  See above levels of A required for all functional tasks  Pt will benefit from further rehab with focus on achieving optimal performance levels with all functional tasks        OT Discharge Recommendation: Short Term Rehab  OT - OK to Discharge:  (to rehab when medically stable)      Comments: Helio Sherwood, [address]

## 2018-05-16 NOTE — PHYSICAL THERAPY NOTE
PHYSICAL THERAPY NOTE          Patient Name: Tylor Charles  [id number] Date: 5/16/2018 05/16/18 1125   Pain Assessment   Pain Assessment No/denies pain   Restrictions/Precautions   Weight Bearing Precautions Per Order No   Other Precautions Cognitive; Chair Alarm; Bed Alarm; Impulsive;Aspiration; Fall Risk;O2;Telemetry;Multiple lines   General   Chart Reviewed Yes   Response to Previous Treatment Patient with no complaints from previous session  Family/Caregiver Present Yes  (Long time friend Libradoilana Bernard present )   Cognition   Overall Cognitive Status Impaired   Arousal/Participation Responsive   Comments frequent redirection needed 2* cognition  Subjective   Subjective FriendWendy at bedside  Reports at [facility] level with assist for transfers and propulsion  Bed Mobility   Sit to Supine 2  Maximal assistance   Additional items Assist x 2; Increased time required;Verbal cues;LE management   Transfers   Sit to Stand 3  Moderate assistance   Additional items Assist x 2; Increased time required;Verbal cues  (pulls up from RW)   Stand to Sit 3  Moderate assistance   Additional items Assist x 2; Increased time required;Verbal cues   Additional Comments Incrased time and cues to complete all mobilty requests  Ambulation/Elevation   Gait pattern Decreased foot clearance; Forward Flexion; Improper Weight shift; Shuffling; Short stride; Excessively slow; Inconsistent karla   Gait Assistance 3  Moderate assist   Additional items Assist x 2;Verbal cues; Tactile cues   Assistive Device Rolling walker   Distance 3'x1 from chair to bed  Balance   Static Sitting Fair   Dynamic Sitting Fair -   Static Standing Poor   Dynamic Standing Poor -   Ambulatory Poor -   Endurance Deficit   Endurance Deficit Yes   Endurance Deficit Description fatigue, weakness, DEL CASTILLO  O2 sats on 3L 96%  HR 81  Use of ear lobe to obtain     Activity Tolerance   Activity Tolerance Patient limited by fatigue;Treatment limited secondary to medical complications (Comment)  (DEL CASTILLO )   Medical Staff Made Aware NurseEelna   Nurse Made Aware yes   Equipment Use   Comments repositioned  Assessment   Prognosis Guarded   Problem List Decreased strength;Decreased endurance; Impaired balance;Decreased mobility; Decreased cognition; Impaired judgement;Decreased safety awareness   Assessment OOB in chair upon arrival   Continues to require modA of 2 wiht increased time and cues for mobility  Able to take few steps from chair to bed with use of RW  Poor safety awareness as unsafe with stand to sit transitions  DEL CASTILLO noted, sats on 96% on 3L  Cont to feel requires higher level of care, STR may be beneficial in order to optimize outcomes  Barriers to Discharge Comments cognition   Goals   Patient Goals none stated   STG Expiration Date 05/25/18   Short Term Goal #1 Bed mobiltiy with Mary  Transfers with Mary  Amb 10'x1 with Mary and RW support  Improve overall balance and strength 1/2 grade  Increase activity tolerance to 45 minutes  Treatment Day 1   Plan   Treatment/Interventions Functional transfer training;LE strengthening/ROM; Therapeutic exercise; Endurance training;Patient/family training;Equipment eval/education; Bed mobility; Compensatory technique education;Gait training;Continued evaluation;Spoke to nursing;OT   Progress Slow progress, decreased activity tolerance   PT Frequency Other (Comment)  (3-5 times per week )   Recommendation   Recommendation Short-term skilled PT   Equipment Recommended Walker   PT - OK to Discharge Yes  (to rhb when medically stable )   Stu Ocampo, PT

## 2018-05-16 NOTE — NUTRITION
05/16/18 1423   Recommendations/Interventions   Summary Pt is not meeting estimated needs with variable intake at this time with puree and NTL  currently ordered Ensure enlive BID, no RD protocol is granted to adjust supplement order  Please adjust supplement to [place] BID for more appropriate consistency supplement

## 2018-05-16 NOTE — PROGRESS NOTES
Pt noted with coarse/moist bilateral lung sounds  VS Stable, stating 96% on 3L of oxygen  No respiratory distress noted  Respiratory made aware  Notified Rapid Response Olivia Prior with direction to continue to monitor pt

## 2018-05-16 NOTE — ASSESSMENT & PLAN NOTE
Patient was initially in septic shock requiring pressors now resolved  Secondary to UTI, no urine cultures available, repeat cultures no growth suggesting Rocephin effective  Completing coures of Ceftin for total of 7 days antibiotics, day 4/4 Ceftin today  Monitor CBC/VS

## 2018-05-16 NOTE — ASSESSMENT & PLAN NOTE
Urine cultures not obtained prior to antibiotics, available urine cultures reveal no growth, suggestive UTI susceptible to Rocephin   Patient to complete antibiotic course with Ceftin #4/4, Abx day #7/7 today  Will monitor CBC/vital signs

## 2018-05-16 NOTE — ASSESSMENT & PLAN NOTE
Likely secondary to [facility]  Urology input appreciated  To f/u with Urology outpatient for voiding trial

## 2018-05-16 NOTE — OCCUPATIONAL THERAPY NOTE
Occupational Therapy Treatment Note:         05/16/18 1123   Restrictions/Precautions   Weight Bearing Precautions Per Order No   Other Precautions Chair Alarm;Cognitive; Bed Alarm; Fall Risk;Pain;Multiple lines;O2   Pain Assessment   Pain Assessment No/denies pain   Pain Score No Pain   ADL   Where Assessed Chair   Grooming Assistance 4  Minimal Assistance   Grooming Deficit Setup;Verbal cueing;Supervision/safety; Increased time to complete;Wash/dry hands; Wash/dry face   Grooming Comments cues to engage in activity required  LB Dressing Assistance 2  Maximal Assistance   LB Dressing Deficit Setup;Verbal cueing;Supervision/safety; Increased time to complete; Don/doff L sock; Don/doff R sock   LB Dressing Comments Limited activity tolerance  functional reach and focus to tasks  Functional Standing Tolerance   Time 1 min   Activity dynamic stand balance activity  Comments Poor activity tolerance noted  Increased SOB noted with activity  Bed Mobility   Sit to Supine 2  Maximal assistance   Additional items Assist x 2;Bedrails; Increased time required;Verbal cues;LE management   Additional Comments Pt unabel to tolerate EOB positioning  Transfers   Sit to Stand 3  Moderate assistance   Additional items Assist x 2   Stand to Sit 3  Moderate assistance   Additional items Assist x 2   Stand pivot 3  Moderate assistance   Additional items Assist x 2   Additional Comments increased verbal cues required for safety with activity       Functional Mobility   Functional Mobility 3  Moderate assistance   Additional Comments x2   Additional items Rolling walker   Cognition   Overall Cognitive Status Impaired   Arousal/Participation Responsive   Attention Difficulty dividing attention   Orientation Level Oriented to person   Memory Decreased short term memory;Decreased recall of recent events;Decreased recall of precautions   Following Commands Follows one step commands with increased time or repetition   Comments Pt reqiured step by step verbal cues through out tx session  Additional Activities   Additional Activities Other (Comment)  (reviewed current plan of care  )   Additional Activities Comments reported all findings to nursing staff  Activity Tolerance   Activity Tolerance Patient limited by fatigue   Medical Staff Made Aware Reported all findings to nursing staff  Assessment   Assessment Pt was seen for skilled OT with focus on completion of basic orientation, light grooming activity, functional mobility, and review of current plan of care  See above levels of A required for all functional tasks  Pt with difficulty following simple commands  Staff provided redirection through out tx session to address self care needs due to poor swallow noted  Reported findings to nursing staff  Encouraged Pt to expectorate phlegm while seated upright in bedside chair  Pt unable to follow simple commands and frequently called out stating, "just give me a minute", or, "I am"  Mod A x2 for SPT from bedside chair to EOB  Max A x2 to return to supine positioning  Moderate redirection and reassurance required through out tx session  Pt pleasant and thankful with return to supine positioning  Encouraged bed positioned as chair  Pt reports, "feels OK', regarding upright positioning of bed  Bed alarm activated upon termination of tx session  See above levels of A required for all functional tasks  Pt will benefit from further rehab with focus on achieving optimal performance levels with all functional tasks  Plan   Treatment Interventions ADL retraining;Functional transfer training;Cognitive reorientation; Endurance training   Goal Expiration Date 05/29/18   Treatment Day 1   OT Frequency 3-5x/wk   Recommendation   OT Discharge Recommendation Short Term Rehab   Barthel Index   Feeding 5   Bathing 0   Grooming Score 0   Dressing Score 0   Bladder Score 0   Bowels Score 5   Toilet Use Score 5   Transfers (Bed/Chair) Score 5   Mobility (Level Surface) Score 0   Stairs Score 0   Barthel Index Score 20   Modified [place] Scale   Modified [place] Scale 4   Love Garcia, [address]

## 2018-05-16 NOTE — ASSESSMENT & PLAN NOTE
Along with heme-positive stools, likely multifactorial anemia with workup consisted with possible MDS as well as anemia of chronic disease  Patient's H&H droped mildly and remained stable, given concern for aspiration and no overt bleeding will defer from invasive workup such as EGD/colonoscopy and continue to monitor - discussed with GI, input appreciated  Monitor CBC daily and transfuse for Hb<7

## 2018-05-16 NOTE — ASSESSMENT & PLAN NOTE
May be due to acute on chronic anemia vs due to infection  EKG NSR no changes from prior  Placed on gentle IV fluids with D5W  Started metoprolol along with amlodipine for BP

## 2018-05-16 NOTE — ASSESSMENT & PLAN NOTE
Speech therapy evaluated and diet adjusted to pureed/nectar thickened liquids  Possible LLL infiltrate vs soft tissue artifact, continue to mointor clinically for s/s of aspiration

## 2018-05-16 NOTE — SOCIAL WORK
Pt is a 81yo male admitted for Sepsis  Spoke with pt's legel guardian Huey Puente who provided information for initial assessment and discharge needs  Prior to admission, pt was living at [facility] assisted living  Requiring assistance with ADLs and ambulation  Pt is confused  Mr  Mc Manley stated he is trying to get pt placed into [address] home and requesting a MA-51 to be filled  Pt referred to [facility] and MA-51 form initiated  Will continue to follow

## 2018-05-16 NOTE — PROGRESS NOTES
Progress Note - Mima Kruger 80 y o  male MRN: [id number]    Unit/Bed#: E5 -01 Encounter: [id number]         Assessment/ Plan:  Anemia  Dysphagia     1  Anemia - Pt was admitted with sepsis, from urinary source & found to have decreased hbg   Hbg currently stable at 9 0  Per nurse dark stool yesterday  He will likely need EGD & possible colonoscopy for further eval however given his respiratory status he would be high risk  Also will need consent from [address] [place] [address]  -Follow H&H     2  Oropharyngeal dysphagia - he has a long hx of dysphagia  He has been followed by speech  He is currently on pureed diet  He is having worsening respiratory decline & there is a concern for possible aspiration  CXR showed soft tissue fold versus infiltrate  We would prefer to do her for endoscopic evaluation until respiratory status improves however if his hemoglobin were to decrease further or if there is evidence of overt bleeding we could then proceed but patient would likely need to be intubated for the procedure  Please call with any acute issues otherwise will see on Friday  Subjective:   Patient seen and examined, no complaints  Tolerating diet  Objective:     Vitals: Blood pressure 130/78, pulse 95, temperature 97 5 °F (36 4 °C), temperature source Temporal, resp  rate 22, height 5' 4" (1 626 m), weight 60 4 kg (133 lb 2 5 oz), SpO2 94 %  ,Body mass index is 22 86 kg/m²  Physical Exam: General appearance: alert and Demented  Lungs: rhonchi and wheezes  Heart: regular rate and rhythm  Abdomen: soft, non-tender; bowel sounds normal; no masses,  no organomegaly  Skin: Skin color, texture, turgor normal  No rashes or lesions     Invasive Devices     Peripheral Intravenous Line            Peripheral IV 05/15/18 Left Arm less than 1 day          Drain            Urethral Catheter Latex 16 Fr  4 days                Lab, Imaging and other studies: I have personally reviewed pertinent reports       CBC:   Lab Results Component Value Date    WBC 8 95 05/16/2018    HGB 9 0 (L) 05/16/2018    HCT 28 3 (L) 05/16/2018     (H) 05/16/2018     05/16/2018    MCH 33 0 05/16/2018    MCHC 31 8 05/16/2018    RDW 18 2 (H) 05/16/2018    MPV 10 2 05/16/2018    NRBC 1 05/16/2018   ,   CMP:   Lab Results   Component Value Date     (H) 05/16/2018    K 3 6 05/16/2018     (H) 05/16/2018    CO2 25 05/16/2018    ANIONGAP 12 05/16/2018    BUN 37 (H) 05/16/2018    CREATININE 1 10 05/16/2018    GLUCOSE 118 05/16/2018    CALCIUM 8 7 05/16/2018    EGFR 62 05/16/2018   ,

## 2018-05-16 NOTE — ASSESSMENT & PLAN NOTE
Malnutrition Findings:   Malnutrition type: Chronic illness  Degree of Malnutrition: Malnutrition of mild degree    BMI Findings: Body mass index is 22 86 kg/m²     Nutrition consult

## 2018-05-16 NOTE — PLAN OF CARE
Problem: PHYSICAL THERAPY ADULT  Goal: Performs mobility at highest level of function for planned discharge setting  See evaluation for individualized goals  Treatment/Interventions: Functional transfer training, LE strengthening/ROM, Therapeutic exercise, Endurance training, Patient/family training, Equipment eval/education, Bed mobility, Gait training, Compensatory technique education, Continued evaluation, Spoke to nursing, Spoke to MD, OT  Equipment Recommended: Linard Bernheim       See flowsheet documentation for full assessment, interventions and recommendations  Outcome: Progressing  Prognosis: Guarded  Problem List: Decreased strength, Decreased endurance, Impaired balance, Decreased mobility, Decreased cognition, Impaired judgement, Decreased safety awareness  Assessment: OOB in chair upon arrival   Continues to require modA of 2 wiht increased time and cues for mobility  Able to take few steps from chair to bed with use of RW  Poor safety awareness as unsafe with stand to sit transitions  DEL CASTILLO noted, sats on 96% on 3L  Cont to feel requires higher level of care, STR may be beneficial in order to optimize outcomes  Barriers to Discharge Comments: cognition  Recommendation: Short-term skilled PT     PT - OK to Discharge: Yes    See flowsheet documentation for full assessment

## 2018-05-16 NOTE — PLAN OF CARE
Problem: DISCHARGE PLANNING - CARE MANAGEMENT  Goal: Discharge to post-acute care or home with appropriate resources  INTERVENTIONS:  - Conduct assessment to determine patient/family and health care team treatment goals, and need for post-acute services based on payer coverage, community resources, and patient preferences, and barriers to discharge  - Address psychosocial, clinical, and financial barriers to discharge as identified in assessment in conjunction with the patient/family and health care team  - Arrange appropriate level of post-acute services according to patients   needs and preference and payer coverage in collaboration with the physician and health care team  - Communicate with and update the patient/family, physician, and health care team regarding progress on the discharge plan  - Arrange appropriate transportation to post-acute venues  Outcome: [address] with legal guardian Phoenix Sanchez and pt referred to [address] [place] [address]  MA-51 initiated

## 2018-05-16 NOTE — PROGRESS NOTES
Progress Note - Mitzi Certain 1934, 80 y o  male MRN: [id number]    Unit/Bed#: E5 -01 Encounter: [id number]    Primary Care Provider: Dev Valdez MD   Date and time admitted to hospital: 5/11/2018  9:36 AM        * Sepsis [facility])   Assessment & Plan    Patient was initially in septic shock requiring pressors now resolved  Secondary to UTI, no urine cultures available, repeat cultures no growth suggesting Rocephin effective  Completing coures of Ceftin for total of 7 days antibiotics, day 4/4 Ceftin today  Monitor CBC/VS          Acute hypernatremia   Assessment & Plan    Place on D5W at 50 mL/hr  BMP in am        Sinus tachycardia   Assessment & Plan    May be due to acute on chronic anemia vs due to infection  EKG NSR no changes from prior  Placed on gentle IV fluids with D5W  Started metoprolol along with amlodipine for BP        Macrocytic anemia   Assessment & Plan    Along with heme-positive stools, likely multifactorial anemia with workup consisted with possible MDS as well as anemia of chronic disease  Patient's H&H droped mildly and remained stable, given concern for aspiration and no overt bleeding will defer from invasive workup such as EGD/colonoscopy and continue to monitor - discussed with GI, input appreciated  Monitor CBC daily and transfuse for Hb<7        Acute cystitis with hematuria   Assessment & Plan    Urine cultures not obtained prior to antibiotics, available urine cultures reveal no growth, suggestive UTI susceptible to Rocephin   Patient to complete antibiotic course with Ceftin #4/4, Abx day #7/7 today  Will monitor CBC/vital signs        Other dysphagia   Assessment & Plan    Speech therapy evaluated and diet adjusted to pureed/nectar thickened liquids  Possible LLL infiltrate vs soft tissue artifact, continue to mointor clinically for s/s of aspiration        Hypokalemia   Assessment & Plan    Resolved with replacement  Monitor BMP          Mild protein[facility]calorie malnutrition ([facility] )   Assessment & Plan    Malnutrition Findings:   Malnutrition type: Chronic illness  Degree of Malnutrition: Malnutrition of mild degree    BMI Findings: Body mass index is 22 86 kg/m²  Nutrition consult        Urinary retention   Assessment & Plan    Likely secondary to [address] [facility]  Urology input appreciated  To f/u with Urology outpatient for voiding trial        Essential hypertension   Assessment & Plan    Started on losartan and metoprolol  Monitor VS        Dementia with behavioral disturbance   Assessment & Plan    Plan to discharge to [facility] once medically appropriate  Updated contact        MARQUISE (acute kidney injury) ([facility] )   Assessment & Plan    Resolved with Cr at baseline  Monitor daily BMP  Avoid nephrotoxins          VTE Pharmacologic Prophylaxis:   Pharmacologic: None due to acute on chronic anemia  Mechanical VTE Prophylaxis in Place: Yes    Patient Centered Rounds: I have performed bedside rounds with nursing staff today  Discussions with Specialists or Other Care Team Provider: GI    Education and Discussions with Family / Patient: Patient's friend Mayuri Cheung    Time Spent for Care: 45 minutes  More than 50% of total time spent on counseling and coordination of care as described above  Current Length of Stay: 5 day(s)    Current Patient Status: Inpatient   Certification Statement: The patient will continue to require additional inpatient hospital stay due to need for close monitoring, diagnostic workup    Discharge Plan: TBD    Code Status: Level 3 - DNAR and DNI      Subjective:   Patient seen and examined  No o/n events  He voices no complaints  No respiratory distress  Objective:     Vitals:   Temp (24hrs), Av 8 °F (36 6 °C), Min:97 2 °F (36 2 °C), Max:98 1 °F (36 7 °C)    HR:  [107-113] 108  Resp:  [18-20] 18  BP: (154-160)/(70-94) 154/94  SpO2:  [96 %-97 %] 96 %  Body mass index is 22 86 kg/m²  Input and Output Summary (last 24 hours):        Intake/Output Summary (Last 24 hours) at 05/16/18 1103  Last data filed at 05/16/18 0643   Gross per 24 hour   Intake            752 5 ml   Output              850 ml   Net            -97 5 ml       Physical Exam:     Physical Exam   Constitutional: No distress  HENT:   Head: Normocephalic and atraumatic  Eyes: Conjunctivae are normal    Neck: No JVD present  Cardiovascular: Normal rate and regular rhythm  No murmur heard  Pulmonary/Chest: Effort normal  No respiratory distress  He has no wheezes  He has rales (Coarse throughout)  Abdominal: Soft  He exhibits no distension  There is no tenderness  There is no guarding  Musculoskeletal: He exhibits no edema  Neurological: He is alert  Skin: Skin is warm and dry  Psychiatric: Cognition and memory are impaired  Additional Data:     Labs:      Results from last 7 days  Lab Units 05/16/18  0619   WBC Thousand/uL 8 95   HEMOGLOBIN g/dL 9 0*   HEMATOCRIT % 28 3*   PLATELETS Thousands/uL 197   NEUTROS PCT % 88*   LYMPHS PCT % 7*   MONOS PCT % 4   EOS PCT % 1       Results from last 7 days  Lab Units 05/16/18  0619 05/15/18  0514   SODIUM mmol/L 152* 148*   POTASSIUM mmol/L 3 6 3 8   CHLORIDE mmol/L 115* 111*   CO2 mmol/L 25 24   BUN mg/dL 37* 32*   CREATININE mg/dL 1 10 1 24   CALCIUM mg/dL 8 7 8 7   TOTAL PROTEIN g/dL  --  7 6   BILIRUBIN TOTAL mg/dL  --  0 98   ALK PHOS U/L  --  70   ALT U/L  --  47   AST U/L  --  35   GLUCOSE RANDOM mg/dL 118 129                     * I Have Reviewed All Lab Data Listed Above  * Additional Pertinent Lab Tests Reviewed: [facility]    Imaging:    Imaging Reports Reviewed Today Include: chest xray  Imaging Personally Reviewed by Myself Includes:  Chest xray    Recent Cultures (last 7 days):       Results from last 7 days  Lab Units 05/11/18  1511 05/11/18  1137 05/11/18  1132   BLOOD CULTURE   --  No Growth After 4 Days  No Growth After 4 Days     URINE CULTURE  No Growth <1000 cfu/mL  -- --        Last 24 Hours Medication List:     Current Facility-Administered Medications:  acetaminophen 650 mg Oral Q6H PRN MICHAEL Fry    atorvastatin 20 mg Oral Daily With Christine SandraMICHAEL lomax    cefuroxime 500 mg Oral Q12H [address], MICHAEL    dextrose 50 mL/hr Intravenous Continuous Isidra Miller MD Last Rate: 50 mL/hr (05/16/18 0917)   docusate sodium 100 mg Oral BID MICHAEL Fry    escitalopram 10 mg Oral Daily MICHAEL Fry    ipratropium 0 5 mg Nebulization Q8H PRN Zeferino Anderson MD    levalbuterol 1 25 mg Nebulization Q8H PRN MICHAEL Fry    levothyroxine 75 mcg Oral Early Morning Zeferino Anderson MD    lidocaine 1 patch Transdermal Daily MICHAEL Fry    losartan 50 mg Oral Daily Isidra Miller MD    metoprolol tartrate 12 5 mg Oral Q12H [address] Isidra Miller MD    pantoprazole 40 mg Oral Daily PRN MICHAEL Fry    pantoprazole 40 mg Intravenous Q12H [address] Annia Doles Spatzer, CRNP    QUEtiapine 25 mg Oral BID MICHAEL Fry    tamsulosin 0 4 mg Oral Daily With David Pretty MD         Today, Patient Was Seen By: Wendi Lawson MD    ** Please Note: Dictation voice to text software may have been used in the creation of this document   **

## 2018-05-17 LAB
ANION GAP SERPL CALCULATED.3IONS-SCNC: 9 MMOL/L (ref 4–13)
ANISOCYTOSIS BLD QL SMEAR: PRESENT
BASOPHILS # BLD MANUAL: 0 THOUSAND/UL (ref 0–0.1)
BASOPHILS NFR MAR MANUAL: 0 % (ref 0–1)
BUN SERPL-MCNC: 40 MG/DL (ref 5–25)
CALCIUM SERPL-MCNC: 8.5 MG/DL (ref 8.3–10.1)
CHLORIDE SERPL-SCNC: 113 MMOL/L (ref 100–108)
CO2 SERPL-SCNC: 26 MMOL/L (ref 21–32)
CREAT SERPL-MCNC: 1.22 MG/DL (ref 0.6–1.3)
EOSINOPHIL # BLD MANUAL: 0.1 THOUSAND/UL (ref 0–0.4)
EOSINOPHIL NFR BLD MANUAL: 1 % (ref 0–6)
ERYTHROCYTE [DISTWIDTH] IN BLOOD BY AUTOMATED COUNT: 18 % (ref 11.6–15.1)
GFR SERPL CREATININE-BSD FRML MDRD: 54 ML/MIN/1.73SQ M
GLUCOSE SERPL-MCNC: 124 MG/DL (ref 65–140)
HCT VFR BLD AUTO: 28 % (ref 36.5–49.3)
HGB BLD-MCNC: 8.6 G/DL (ref 12–17)
LYMPHOCYTES # BLD AUTO: 0.79 THOUSAND/UL (ref 0.6–4.47)
LYMPHOCYTES # BLD AUTO: 8 % (ref 14–44)
MCH RBC QN AUTO: 32.5 PG (ref 26.8–34.3)
MCHC RBC AUTO-ENTMCNC: 30.7 G/DL (ref 31.4–37.4)
MCV RBC AUTO: 106 FL (ref 82–98)
MONOCYTES # BLD AUTO: 0.2 THOUSAND/UL (ref 0–1.22)
MONOCYTES NFR BLD: 2 % (ref 4–12)
NEUTROPHILS # BLD MANUAL: 8.78 THOUSAND/UL (ref 1.85–7.62)
NEUTS BAND NFR BLD MANUAL: 18 % (ref 0–8)
NEUTS SEG NFR BLD AUTO: 71 % (ref 43–75)
NRBC BLD AUTO-RTO: 1 /100 WBC (ref 0–2)
OVALOCYTES BLD QL SMEAR: PRESENT
PLATELET # BLD AUTO: 224 THOUSANDS/UL (ref 149–390)
PLATELET BLD QL SMEAR: ADEQUATE
PMV BLD AUTO: 10.4 FL (ref 8.9–12.7)
POTASSIUM SERPL-SCNC: 3.7 MMOL/L (ref 3.5–5.3)
RBC # BLD AUTO: 2.65 MILLION/UL (ref 3.88–5.62)
SODIUM SERPL-SCNC: 148 MMOL/L (ref 136–145)
TOTAL CELLS COUNTED SPEC: 100
WBC # BLD AUTO: 9.87 THOUSAND/UL (ref 4.31–10.16)

## 2018-05-17 PROCEDURE — 99232 SBSQ HOSP IP/OBS MODERATE 35: CPT | Performed by: FAMILY MEDICINE

## 2018-05-17 PROCEDURE — 97530 THERAPEUTIC ACTIVITIES: CPT

## 2018-05-17 PROCEDURE — 97535 SELF CARE MNGMENT TRAINING: CPT

## 2018-05-17 PROCEDURE — 85007 BL SMEAR W/DIFF WBC COUNT: CPT | Performed by: FAMILY MEDICINE

## 2018-05-17 PROCEDURE — 85027 COMPLETE CBC AUTOMATED: CPT | Performed by: FAMILY MEDICINE

## 2018-05-17 PROCEDURE — 80048 BASIC METABOLIC PNL TOTAL CA: CPT | Performed by: FAMILY MEDICINE

## 2018-05-17 PROCEDURE — C9113 INJ PANTOPRAZOLE SODIUM, VIA: HCPCS | Performed by: NURSE PRACTITIONER

## 2018-05-17 RX ORDER — CEFUROXIME AXETIL 250 MG/1
500 TABLET ORAL ONCE
Status: COMPLETED | OUTPATIENT
Start: 2018-05-17 | End: 2018-05-17

## 2018-05-17 RX ADMIN — METOPROLOL TARTRATE 12.5 MG: 25 TABLET ORAL at 09:21

## 2018-05-17 RX ADMIN — ESCITALOPRAM OXALATE 10 MG: 10 TABLET ORAL at 09:21

## 2018-05-17 RX ADMIN — LEVOTHYROXINE SODIUM 75 MCG: 75 TABLET ORAL at 05:03

## 2018-05-17 RX ADMIN — CEFUROXIME AXETIL 500 MG: 250 TABLET ORAL at 21:49

## 2018-05-17 RX ADMIN — CEFUROXIME AXETIL 500 MG: 250 TABLET ORAL at 09:21

## 2018-05-17 RX ADMIN — LIDOCAINE 1 PATCH: 50 PATCH CUTANEOUS at 09:21

## 2018-05-17 RX ADMIN — DEXTROSE 50 ML/HR: 5 SOLUTION INTRAVENOUS at 06:27

## 2018-05-17 RX ADMIN — METOPROLOL TARTRATE 25 MG: 25 TABLET ORAL at 21:49

## 2018-05-17 RX ADMIN — ATORVASTATIN CALCIUM 20 MG: 20 TABLET, FILM COATED ORAL at 17:18

## 2018-05-17 RX ADMIN — PANTOPRAZOLE SODIUM 40 MG: 40 INJECTION, POWDER, FOR SOLUTION INTRAVENOUS at 21:49

## 2018-05-17 RX ADMIN — QUETIAPINE FUMARATE 25 MG: 25 TABLET ORAL at 09:21

## 2018-05-17 RX ADMIN — LOSARTAN POTASSIUM 50 MG: 50 TABLET, FILM COATED ORAL at 09:21

## 2018-05-17 RX ADMIN — QUETIAPINE FUMARATE 25 MG: 25 TABLET ORAL at 17:17

## 2018-05-17 RX ADMIN — PANTOPRAZOLE SODIUM 40 MG: 40 INJECTION, POWDER, FOR SOLUTION INTRAVENOUS at 09:36

## 2018-05-17 RX ADMIN — TAMSULOSIN HYDROCHLORIDE 0.4 MG: 0.4 CAPSULE ORAL at 17:18

## 2018-05-17 NOTE — PLAN OF CARE
Problem: OCCUPATIONAL THERAPY ADULT  Goal: Performs self-care activities at highest level of function for planned discharge setting  See evaluation for individualized goals  Treatment Interventions: ADL retraining, Functional transfer training, UE strengthening/ROM, Endurance training, Cognitive reorientation, Patient/family training, Equipment evaluation/education, Compensatory technique education, Activityengagement, Energy conservation          See flowsheet documentation for full assessment, interventions and recommendations  Outcome: Progressing  Limitation: Decreased ADL status, Decreased UE strength, Decreased Safe judgement during ADL, Decreased cognition, Decreased endurance, Decreased self-care trans, Decreased high-level ADLs  Prognosis: Fair  Assessment: Pt was seen for skilled OT with focus on light grooming, cognitive activity with focus on fine motor coordination and review of leisure interests  Inconsistent focus to tasks noted  Improved mood noted with completion of card sequencing activity with use of EMEKA cards  Pt was able to correctly identify appropriate sequence of 5 cards with presentation on tray table positioned at chest level in central view  Limited useage of RUE noted  Pt primarily used LUE with completion of card activity at tray table  Pt required step by step verbal cues to engage in activity with one and two word commands  Pt was agreeable to music being played  Pt reports enjoying Catalina Tyroslana and participated in functional table top tasks with cues provided for more than 3-5 mins at a time before losing focus  Min A overall for light grooming at mirror in bedside table  See above levels of A required for all functional tasks  Pt will benefit from further rehab with focus on achieving optimal independence with all functional tasks        OT Discharge Recommendation: Short Term Rehab  OT - OK to Discharge:  (to rehab when medically stable)      Comments: Noel Moreno, GUSTAFSON

## 2018-05-17 NOTE — ASSESSMENT & PLAN NOTE
Along with heme-positive stools, likely multifactorial anemia with workup consisted with possible MDS as well as anemia of chronic disease  Patient's H&H continues to drop slowly, will continue to monitor closely with ongoing discussion with GI regarding potential further workup  Monitor CBC daily and transfuse for Hb<7

## 2018-05-17 NOTE — ASSESSMENT & PLAN NOTE
Urine cultures not obtained prior to antibiotics, available urine cultures reveal no growth, suggestive UTI susceptible to Rocephin which was then transitioned to Ceftin  Patient to completing 7 day antibiotic course tonight  Will monitor CBC/vital signs

## 2018-05-17 NOTE — ASSESSMENT & PLAN NOTE
Malnutrition Findings:   Malnutrition type: Chronic illness  Degree of Malnutrition: Malnutrition of mild degree    BMI Findings: Body mass index is 26 1 kg/m²     Nutrition consult

## 2018-05-17 NOTE — PROGRESS NOTES
Progress Note - Patsy Gilbert 1934, 80 y o  male MRN: [id number]    Unit/Bed#: E5 -01 Encounter: [id number]    Primary Care Provider: La Schulz MD   Date and time admitted to hospital: 5/11/2018  9:36 AM        * Sepsis [facility])   Assessment & Plan    Patient was initially in septic shock requiring pressors now resolved  Secondary to UTI, no urine cultures available, repeat cultures no growth suggesting Rocephin effective  Completing course of Abx today  Monitor CBC/VS          Acute hypernatremia   Assessment & Plan    Improved from 152 to 148  Continue on D5W at 50 mL/hr  BMP in am        Sinus tachycardia   Assessment & Plan    Improved with re-initiation of metoprolol  Continue close monitoring of VS        Macrocytic anemia   Assessment & Plan    Along with heme-positive stools, likely multifactorial anemia with workup consisted with possible MDS as well as anemia of chronic disease  Patient's H&H continues to drop slowly, will continue to monitor closely with ongoing discussion with GI regarding potential further workup  Monitor CBC daily and transfuse for Hb<7        Acute cystitis with hematuria   Assessment & Plan    Urine cultures not obtained prior to antibiotics, available urine cultures reveal no growth, suggestive UTI susceptible to Rocephin which was then transitioned to Ceftin  Patient to completing 7 day antibiotic course tonight  Will monitor CBC/vital signs        Other dysphagia   Assessment & Plan    Speech therapy evaluated and diet adjusted to pureed/nectar thickened liquids  Possible LLL infiltrate vs soft tissue artifact, continue to mointor clinically for s/s of aspiration        Hypokalemia   Assessment & Plan    Resolved with replacement  Monitor BMP          Mild protein-calorie malnutrition (HCC)   Assessment & Plan    Malnutrition Findings:   Malnutrition type: Chronic illness  Degree of Malnutrition: Malnutrition of mild degree    BMI Findings:         Body mass index is 26 1 kg/m²  Nutrition consult        Urinary retention   Assessment & Plan    Likely secondary to [address] [facility]  Urology input appreciated  To f/u with Urology outpatient for voiding trial        Essential hypertension   Assessment & Plan    Continue losartan, increased metoprolol to 25 mg BID  Monitor VS        Coronary artery disease involving native coronary artery of native heart   Assessment & Plan    Patient on metoprolol and atorvastatin  Plavix discontinued due to anemia        Dementia with behavioral disturbance   Assessment & Plan    Plan to discharge to [facility] once medically appropriate  Ongoing update of contact         MARQUISE (acute kidney injury) ([facility] [address] )   Assessment & Plan    Resolved with Cr at baseline  Monitor daily BMP  Avoid nephrotoxins          VTE Pharmacologic Prophylaxis:   Pharmacologic: None due to acute anemia  Mechanical VTE Prophylaxis in Place: Yes    Patient Centered Rounds: I have performed bedside rounds with nursing staff today  Discussions with Specialists or Other Care Team Provider: GI    Education and Discussions with Family / Patient: ongoing update of contact/POA    Time Spent for Care: 30 minutes  More than 50% of total time spent on counseling and coordination of care as described above  Current Length of Stay: 6 day(s)    Current Patient Status: Inpatient   Certification Statement: The patient will continue to require additional inpatient hospital stay due to need for close monitoring, daily labs    Discharge Plan: TBD    Code Status: Level 3 - DNAR and DNI      Subjective:   Patient seen and examined  He has no complaints  He denies shortness of breath and his breathing appears comfortable  He is unable to provide meaningful history due to underlying dementia  No overnight events      Objective:     Vitals:   Temp (24hrs), Av °F (37 2 °C), Min:98 6 °F (37 °C), Max:99 3 °F (37 4 °C)    HR:  [] 92  Resp:  [19-20] 19  BP: (118-159)/(71-95) 118/71  SpO2: [95 %-98 %] 98 %  Body mass index is 26 1 kg/m²  Input and Output Summary (last 24 hours): Intake/Output Summary (Last 24 hours) at 05/17/18 1645  Last data filed at 05/17/18 0501   Gross per 24 hour   Intake                0 ml   Output              600 ml   Net             -600 ml       Physical Exam:     Physical Exam   Constitutional: No distress  HENT:   Head: Normocephalic and atraumatic  Eyes: Conjunctivae are normal    Neck: No JVD present  Cardiovascular: Normal rate and regular rhythm  No murmur heard  Pulmonary/Chest: Effort normal  No respiratory distress  He has no wheezes  He has no rales  Abdominal: Soft  He exhibits no distension  There is no tenderness  There is no guarding  Musculoskeletal: He exhibits no edema  Neurological: He is alert  Skin: Skin is warm and dry  Psychiatric: His speech is delayed  He is slowed and withdrawn  Cognition and memory are impaired  Additional Data:     Labs:      Results from last 7 days  Lab Units 05/17/18  0537 05/16/18  0619   WBC Thousand/uL 9 87 8 95   HEMOGLOBIN g/dL 8 6* 9 0*   HEMATOCRIT % 28 0* 28 3*   PLATELETS Thousands/uL 224 197   NEUTROS PCT %  --  88*   LYMPHS PCT %  --  7*   LYMPHO PCT % 8*  --    MONOS PCT %  --  4   MONO PCT MAN % 2*  --    EOS PCT %  --  1   EOSINO PCT MANUAL % 1  --        Results from last 7 days  Lab Units 05/17/18  0537  05/15/18  0514   SODIUM mmol/L 148*  < > 148*   POTASSIUM mmol/L 3 7  < > 3 8   CHLORIDE mmol/L 113*  < > 111*   CO2 mmol/L 26  < > 24   BUN mg/dL 40*  < > 32*   CREATININE mg/dL 1 22  < > 1 24   CALCIUM mg/dL 8 5  < > 8 7   TOTAL PROTEIN g/dL  --   --  7 6   BILIRUBIN TOTAL mg/dL  --   --  0 98   ALK PHOS U/L  --   --  70   ALT U/L  --   --  47   AST U/L  --   --  35   GLUCOSE RANDOM mg/dL 124  < > 129   < > = values in this interval not displayed  * I Have Reviewed All eLab Data Listed Above  * Additional Pertinent Lab Tests Reviewed:  All Labs For Current Hospital Admission Reviewed    Imaging:    Imaging Reports Reviewed Today Include: None today    Recent Cultures (last 7 days):       Results from last 7 days  Lab Units 05/11/18  1511 05/11/18  1137 05/11/18  1132   BLOOD CULTURE   --  No Growth After 5 Days  No Growth After 5 Days  URINE CULTURE  No Growth <1000 cfu/mL  --   --        Last 24 Hours Medication List:     Current Facility-Administered Medications:  acetaminophen 650 mg Oral Q6H PRN Olga , CRNP    atorvastatin 20 mg Oral Daily With MICHAEL Machuca    cefuroxime 500 mg Oral Once Yvon Bello MD    dextrose 50 mL/hr Intravenous Continuous Yvon Bello MD Last Rate: 50 mL/hr (05/17/18 9692)   docusate sodium 100 mg Oral BID Olga , CRNP    escitalopram 10 mg Oral Daily Olga , CRNP    ipratropium 0 5 mg Nebulization Q8H PRN Oli Torres MD    levalbuterol 1 25 mg Nebulization Q8H PRN Olga , CRNP    levothyroxine 75 mcg Oral Early Morning Oli Torres MD    lidocaine 1 patch Transdermal Daily Olga , CRNP    losartan 50 mg Oral Daily Yvon Bello MD    metoprolol tartrate 25 mg Oral Q12H [facility] Yvon Bello MD    pantoprazole 40 mg Oral Daily PRN [facility], CRLEIA    pantoprazole 40 mg Intravenous Q12H [facility] Marvelyn Beauvais Spatzer, MICHAEL    QUEtiapine 25 mg Oral BID Olga , CRNP    tamsulosin 0 4 mg Oral Daily With Charley Cordero MD         Today, Patient Was Seen By: Jose J Yanez MD    ** Please Note: Dictation voice to text software may have been used in the creation of this document   **

## 2018-05-17 NOTE — ASSESSMENT & PLAN NOTE
Patient was initially in septic shock requiring pressors now resolved  Secondary to UTI, no urine cultures available, repeat cultures no growth suggesting Rocephin effective  Completing course of Abx today  Monitor CBC/VS

## 2018-05-17 NOTE — ASSESSMENT & PLAN NOTE
Likely secondary to [address] [facility]  Urology input appreciated  To f/u with Urology outpatient for voiding trial

## 2018-05-18 LAB
ANION GAP SERPL CALCULATED.3IONS-SCNC: 6 MMOL/L (ref 4–13)
ANISOCYTOSIS BLD QL SMEAR: PRESENT
BASOPHILS # BLD MANUAL: 0 THOUSAND/UL (ref 0–0.1)
BASOPHILS NFR MAR MANUAL: 0 % (ref 0–1)
BUN SERPL-MCNC: 35 MG/DL (ref 5–25)
CALCIUM SERPL-MCNC: 8.3 MG/DL (ref 8.3–10.1)
CHLORIDE SERPL-SCNC: 113 MMOL/L (ref 100–108)
CO2 SERPL-SCNC: 29 MMOL/L (ref 21–32)
CREAT SERPL-MCNC: 1.06 MG/DL (ref 0.6–1.3)
EOSINOPHIL # BLD MANUAL: 0.22 THOUSAND/UL (ref 0–0.4)
EOSINOPHIL NFR BLD MANUAL: 2 % (ref 0–6)
ERYTHROCYTE [DISTWIDTH] IN BLOOD BY AUTOMATED COUNT: 18.1 % (ref 11.6–15.1)
GFR SERPL CREATININE-BSD FRML MDRD: 65 ML/MIN/1.73SQ M
GLUCOSE SERPL-MCNC: 130 MG/DL (ref 65–140)
HCT VFR BLD AUTO: 29.7 % (ref 36.5–49.3)
HGB BLD-MCNC: 9.2 G/DL (ref 12–17)
LYMPHOCYTES # BLD AUTO: 1.31 THOUSAND/UL (ref 0.6–4.47)
LYMPHOCYTES # BLD AUTO: 12 % (ref 14–44)
MCH RBC QN AUTO: 33.1 PG (ref 26.8–34.3)
MCHC RBC AUTO-ENTMCNC: 31 G/DL (ref 31.4–37.4)
MCV RBC AUTO: 107 FL (ref 82–98)
MONOCYTES # BLD AUTO: 0 THOUSAND/UL (ref 0–1.22)
MONOCYTES NFR BLD: 0 % (ref 4–12)
NEUTROPHILS # BLD MANUAL: 9.36 THOUSAND/UL (ref 1.85–7.62)
NEUTS BAND NFR BLD MANUAL: 13 % (ref 0–8)
NEUTS SEG NFR BLD AUTO: 73 % (ref 43–75)
NRBC BLD AUTO-RTO: 1 /100 WBC (ref 0–2)
OVALOCYTES BLD QL SMEAR: PRESENT
PLATELET # BLD AUTO: 205 THOUSANDS/UL (ref 149–390)
PLATELET BLD QL SMEAR: ADEQUATE
PMV BLD AUTO: 10.4 FL (ref 8.9–12.7)
POLYCHROMASIA BLD QL SMEAR: PRESENT
POTASSIUM SERPL-SCNC: 3.6 MMOL/L (ref 3.5–5.3)
RBC # BLD AUTO: 2.78 MILLION/UL (ref 3.88–5.62)
SODIUM SERPL-SCNC: 148 MMOL/L (ref 136–145)
TOTAL CELLS COUNTED SPEC: 100
WBC # BLD AUTO: 10.88 THOUSAND/UL (ref 4.31–10.16)

## 2018-05-18 PROCEDURE — 85007 BL SMEAR W/DIFF WBC COUNT: CPT | Performed by: FAMILY MEDICINE

## 2018-05-18 PROCEDURE — 80048 BASIC METABOLIC PNL TOTAL CA: CPT | Performed by: FAMILY MEDICINE

## 2018-05-18 PROCEDURE — 99232 SBSQ HOSP IP/OBS MODERATE 35: CPT | Performed by: FAMILY MEDICINE

## 2018-05-18 PROCEDURE — 92526 ORAL FUNCTION THERAPY: CPT

## 2018-05-18 PROCEDURE — C9113 INJ PANTOPRAZOLE SODIUM, VIA: HCPCS | Performed by: NURSE PRACTITIONER

## 2018-05-18 PROCEDURE — 99231 SBSQ HOSP IP/OBS SF/LOW 25: CPT | Performed by: PHYSICIAN ASSISTANT

## 2018-05-18 PROCEDURE — 85027 COMPLETE CBC AUTOMATED: CPT | Performed by: FAMILY MEDICINE

## 2018-05-18 RX ORDER — LOSARTAN POTASSIUM 50 MG/1
100 TABLET ORAL DAILY
Status: DISCONTINUED | OUTPATIENT
Start: 2018-05-19 | End: 2018-05-23 | Stop reason: HOSPADM

## 2018-05-18 RX ADMIN — METOPROLOL TARTRATE 25 MG: 25 TABLET ORAL at 21:11

## 2018-05-18 RX ADMIN — LEVOTHYROXINE SODIUM 75 MCG: 75 TABLET ORAL at 05:04

## 2018-05-18 RX ADMIN — METOPROLOL TARTRATE 25 MG: 25 TABLET ORAL at 08:39

## 2018-05-18 RX ADMIN — QUETIAPINE FUMARATE 25 MG: 25 TABLET ORAL at 08:39

## 2018-05-18 RX ADMIN — DEXTROSE 50 ML/HR: 5 SOLUTION INTRAVENOUS at 03:35

## 2018-05-18 RX ADMIN — PANTOPRAZOLE SODIUM 40 MG: 40 INJECTION, POWDER, FOR SOLUTION INTRAVENOUS at 08:39

## 2018-05-18 RX ADMIN — TAMSULOSIN HYDROCHLORIDE 0.4 MG: 0.4 CAPSULE ORAL at 17:31

## 2018-05-18 RX ADMIN — DOCUSATE SODIUM 100 MG: 100 CAPSULE, LIQUID FILLED ORAL at 17:31

## 2018-05-18 RX ADMIN — QUETIAPINE FUMARATE 25 MG: 25 TABLET ORAL at 17:31

## 2018-05-18 RX ADMIN — LIDOCAINE 1 PATCH: 50 PATCH CUTANEOUS at 08:41

## 2018-05-18 RX ADMIN — ESCITALOPRAM OXALATE 10 MG: 10 TABLET ORAL at 08:39

## 2018-05-18 RX ADMIN — PANTOPRAZOLE SODIUM 40 MG: 40 INJECTION, POWDER, FOR SOLUTION INTRAVENOUS at 21:12

## 2018-05-18 RX ADMIN — LOSARTAN POTASSIUM 50 MG: 50 TABLET, FILM COATED ORAL at 08:39

## 2018-05-18 RX ADMIN — ATORVASTATIN CALCIUM 20 MG: 20 TABLET, FILM COATED ORAL at 17:31

## 2018-05-18 RX ADMIN — DEXTROSE 75 ML/HR: 5 SOLUTION INTRAVENOUS at 19:38

## 2018-05-18 RX ADMIN — DOCUSATE SODIUM 100 MG: 100 CAPSULE, LIQUID FILLED ORAL at 08:39

## 2018-05-18 NOTE — ASSESSMENT & PLAN NOTE
Along with heme-positive stools, likely multifactorial anemia with workup consisted with possible MDS as well as anemia of chronic disease  Patient's H&H remains stable around 9, discussed with GI, no further workup planned

## 2018-05-18 NOTE — PLAN OF CARE
Problem: DISCHARGE PLANNING - CARE MANAGEMENT  Goal: Discharge to post-acute care or home with appropriate resources  INTERVENTIONS:  - Conduct assessment to determine patient/family and health care team treatment goals, and need for post-acute services based on payer coverage, community resources, and patient preferences, and barriers to discharge  - Address psychosocial, clinical, and financial barriers to discharge as identified in assessment in conjunction with the patient/family and health care team  - Arrange appropriate level of post-acute services according to patients   needs and preference and payer coverage in collaboration with the physician and health care team  - Communicate with and update the patient/family, physician, and health care team regarding progress on the discharge plan  - Arrange appropriate transportation to post-acute venues   Outcome: Adequate for Discharge  -Pt is accepted to [address] [place] [address] for LTC placement  Transportation setup with [place] transport for 10:30am on 5/19  Guardian's office informed and in agreement

## 2018-05-18 NOTE — PROGRESS NOTES
Progress Note - Mitzi Certain 1934, 80 y o  male MRN: [id number]    Unit/Bed#: E5 -01 Encounter: [id number]    Primary Care Provider: Dev Valdez MD   Date and time admitted to hospital: 5/11/2018  9:36 AM        * Sepsis [facility])   Assessment & Plan    Patient was initially in septic shock requiring pressors now resolved  Secondary to UTI, no urine cultures available, repeat cultures no growth suggesting Rocephin effective  Completed 7/7 course of Abx - Rocephin then Ceftin last day 5/17   Monitor CBC/VS          Acute hypernatremia   Assessment & Plan    Na stable at 148  Increase D5W to 75 mL/hr  BMP in am  Anticipate discharge in am unless worsens        Sinus tachycardia   Assessment & Plan    Improved with re-initiation of metoprolol  Continue close monitoring of VS        Macrocytic anemia   Assessment & Plan    Along with heme-positive stools, likely multifactorial anemia with workup consisted with possible MDS as well as anemia of chronic disease  Patient's H&H remains stable around 9, discussed with GI, no further workup planned        Acute cystitis with hematuria   Assessment & Plan    Urine cultures not obtained prior to antibiotics, available urine cultures reveal no growth, suggestive UTI susceptible to Rocephin which was then transitioned to Ceftin  Patient to completing 7 day antibiotic course tonight  Will monitor CBC/vital signs        Other dysphagia   Assessment & Plan    Speech therapy evaluated and diet adjusted to pureed/nectar thickened liquids  Patient remains stable with normal oxygen saturation on NC, 98-99%, will attempt to wean off O2        Hypokalemia   Assessment & Plan    Resolved with replacement  Monitor BMP          Mild protein-calorie malnutrition (HCC)   Assessment & Plan    Malnutrition Findings:   Malnutrition type: Chronic illness  Degree of Malnutrition: Malnutrition of mild degree    BMI Findings: Body mass index is 26 11 kg/m²     Nutrition consult        Urinary retention   Assessment & Plan    Likely secondary to [address]  Urology input appreciated  To f/u with Urology outpatient for voiding trial        Essential hypertension   Assessment & Plan    Increase losartan to 100 mg daily as was taking previously, continue metoprolol to 25 mg BID  Monitor VS        Coronary artery disease involving native coronary artery of native heart   Assessment & Plan    Patient on metoprolol and atorvastatin  Plavix discontinued due to anemia        Dementia with behavioral disturbance   Assessment & Plan    Plan to discharge to [facility] once medically appropriate  Ongoing update of contact         MARQUISE (acute kidney injury) ([facility] [address] )   Assessment & Plan    Resolved with Cr at baseline  Monitor daily BMP  Avoid nephrotoxins          VTE Pharmacologic Prophylaxis:   Pharmacologic: None due to anemia  Mechanical VTE Prophylaxis in Place: Yes    Patient Centered Rounds: I have performed bedside rounds with nursing staff today  Discussions with Specialists or Other Care Team Provider:  Speech therapy    Education and Discussions with Family / Patient: attempted to contact POA, left message    Time Spent for Care: 30 minutes  More than 50% of total time spent on counseling and coordination of care as described above  Current Length of Stay: 7 day(s)    Current Patient Status: Inpatient   Certification Statement: The patient will continue to require additional inpatient hospital stay due to need for IV fluids    Discharge Plan: 24-48 hours    Code Status: Level 3 - DNAR and DNI      Subjective:   Patient seen and examined  He appears comfortable  He has no complaints, when asked, denied any, shortness of breath, nausea or vomiting  Per nursing staff, patient continues to have poor appetite but does better with nutritional supplements  There were no overnight events        Objective:     Vitals:   Temp (24hrs), Av 7 °F (37 1 °C), Min:97 7 °F (36 5 °C), Max:99 5 °F (37 5 °C)    HR:  [] 94  Resp:  [18-20] 20  BP: (144-157)/(73-87) 157/73  SpO2:  [97 %-99 %] 99 %  Body mass index is 26 11 kg/m²  Input and Output Summary (last 24 hours): Intake/Output Summary (Last 24 hours) at 05/18/18 1717  Last data filed at 05/18/18 1300   Gross per 24 hour   Intake           660 83 ml   Output             2750 ml   Net         -2089 17 ml       Physical Exam:     Physical Exam   Constitutional: No distress  HENT:   Head: Normocephalic and atraumatic  Eyes: Conjunctivae are normal    Neck: No JVD present  Cardiovascular: Normal rate and regular rhythm  No murmur heard  Pulmonary/Chest: Effort normal  No respiratory distress  He has no wheezes  He has no rales  Abdominal: Soft  He exhibits no distension  There is no tenderness  There is no guarding  Genitourinary:   Genitourinary Comments: Baird   Musculoskeletal: He exhibits no edema  Neurological: He is alert  He exhibits normal muscle tone  Skin: Skin is warm and dry  Psychiatric: His speech is delayed  He is slowed and withdrawn  Cognition and memory are impaired  Additional Data:     Labs:      Results from last 7 days  Lab Units 05/18/18  0640  05/16/18  0619   WBC Thousand/uL 10 88*  < > 8 95   HEMOGLOBIN g/dL 9 2*  < > 9 0*   HEMATOCRIT % 29 7*  < > 28 3*   PLATELETS Thousands/uL 205  < > 197   NEUTROS PCT %  --   --  88*   LYMPHS PCT %  --   --  7*   LYMPHO PCT % 12*  < >  --    MONOS PCT %  --   --  4   MONO PCT MAN % 0*  < >  --    EOS PCT %  --   --  1   EOSINO PCT MANUAL % 2  < >  --    < > = values in this interval not displayed      Results from last 7 days  Lab Units 05/18/18  0640  05/15/18  0514   SODIUM mmol/L 148*  < > 148*   POTASSIUM mmol/L 3 6  < > 3 8   CHLORIDE mmol/L 113*  < > 111*   CO2 mmol/L 29  < > 24   BUN mg/dL 35*  < > 32*   CREATININE mg/dL 1 06  < > 1 24   CALCIUM mg/dL 8 3  < > 8 7   TOTAL PROTEIN g/dL  --   --  7 6   BILIRUBIN TOTAL mg/dL  --   --  0 98   ALK PHOS U/L  --   --  70   ALT U/L  --   --  47   AST U/L  --   --  35   GLUCOSE RANDOM mg/dL 130  < > 129   < > = values in this interval not displayed  * I Have Reviewed All Lab Data Listed Above  * Additional Pertinent Lab Tests Reviewed: [address] Admission Reviewed    Imaging:    Imaging Reports Reviewed Today Include: none today    Recent Cultures (last 7 days):           Last 24 Hours Medication List:     Current Facility-Administered Medications:  acetaminophen 650 mg Oral Q6H PRN MICHAEL Rodriguez    atorvastatin 20 mg Oral Daily With MICHAEL Watson    dextrose 75 mL/hr Intravenous Continuous Jayden Isaacs MD Last Rate: 75 mL/hr (05/18/18 1218)   docusate sodium 100 mg Oral BID MICHAEL Rodriguez    escitalopram 10 mg Oral Daily MICHAEL Rodriguez    levothyroxine 75 mcg Oral Early Morning Jasmina Lorenzana MD    lidocaine 1 patch Transdermal Daily MICHAEL Rodriguez    [START ON 5/19/2018] losartan 100 mg Oral Daily Vinita Taylor MD    metoprolol tartrate 25 mg Oral Q12H [address] Jayden Isaacs MD    pantoprazole 40 mg Oral Daily PRN Gerlachekvin Sahu, MICHAEL    pantoprazole 40 mg Intravenous Q12H [address] Lethaniel Copas Spatzer, CRNP    QUEtiapine 25 mg Oral BID MICHAEL Rodriguez    tamsulosin 0 4 mg Oral Daily With Daniel Jordan MD         Today, Patient Was Seen By: Roberto Carlos Ceja MD    ** Please Note: Dictation voice to text software may have been used in the creation of this document   **

## 2018-05-18 NOTE — ASSESSMENT & PLAN NOTE
Plan to discharge to [facility] once medically appropriate  Ongoing update of contact

## 2018-05-18 NOTE — ASSESSMENT & PLAN NOTE
Speech therapy evaluated and diet adjusted to pureed/nectar thickened liquids  Patient remains stable with normal oxygen saturation on NC, 98-99%, will attempt to wean off O2

## 2018-05-18 NOTE — SPEECH THERAPY NOTE
Speech Language/Pathology    Speech/Language Pathology Progress Note    Patient Name: Mima Kruger  Today's Date: 5/18/2018     Problem List  Patient Active Problem List   Diagnosis    [facility]    Mitral regurgitation    Cognitive decline    MARQUISE (acute kidney injury) ([facility] )    Dementia with behavioral disturbance    PAT (paroxysmal atrial tachycardia) ([facility] )    Coronary artery disease involving native coronary artery of native heart    Essential hypertension    Sepsis ([facility] )    Urinary retention    Mild protein-calorie malnutrition ([facility] )    Hypokalemia    Other dysphagia    Acute cystitis with hematuria    Macrocytic anemia    Sinus tachycardia    Acute hypernatremia        Past Medical History  Past Medical History:   Diagnosis Date    Cardiac disease     COPD (chronic obstructive pulmonary disease) ([facility] )     Coronary artery disease     Hyperlipidemia     Hypertension         Past Surgical History  No past surgical history on file  Subjective:  Pt more alert and responding more appropriately this session  Objective:  seen for dysphagia tx  Sounded congested when setaed upright  Still not coughing on command  Tolerated nectar thick juice by straw and mightly shake by tsp  (took thick for him to suck by straw  Swallows prompt  No overt s/s aspiration  Assessment:  Tolerating current diet  Plan/Recommendations:  Continue puree and nectar thick

## 2018-05-18 NOTE — PROGRESS NOTES
Progress Note - Marybel Elliott 80 y o  male MRN: [id number]    Unit/Bed#: E5 -01 Encounter: [id number]         Assessment/ Plan:  Anemia  Dysphagia     1  Anemia - Pt was admitted with sepsis, from urinary source & found to have decreased hbg   Hbg currently stable at 9 2  Per nurse no stool  EGD & colonoscopy were considered but given his dementia & poor respiratory status we decided to not persue endoscopic intervention       2  Oropharyngeal dysphagia - he has a long hx of dysphagia  He has been followed by speech  He is currently on pureed diet  He is having worsening respiratory decline & there is a concern for possible aspiration  CXR showed soft tissue fold versus infiltrate    -Diet per speech  OK to d/c    Subjective:   Pt seen & examined  No complaints  Objective:     Vitals: Blood pressure 154/74, pulse 95, temperature 99 5 °F (37 5 °C), temperature source Temporal, resp  rate 18, height 5' 4" (1 626 m), weight 69 kg (152 lb 1 9 oz), SpO2 98 %  ,Body mass index is 26 11 kg/m²  Physical Exam: General appearance: Awake & alert  Lungs: wheezes  Heart: regular rate and rhythm  Abdomen: soft, non-tender; bowel sounds normal; no masses,  no organomegaly  Skin: Skin color, texture, turgor normal  No rashes or lesions     Invasive Devices     Peripheral Intravenous Line            Peripheral IV 05/17/18 Right Forearm 1 day          Drain            Urethral Catheter Latex 16 Fr  6 days                Lab, Imaging and other studies: I have personally reviewed pertinent reports       CBC:   Lab Results   Component Value Date    WBC 10 88 (H) 05/18/2018    HGB 9 2 (L) 05/18/2018    HCT 29 7 (L) 05/18/2018     (H) 05/18/2018     05/18/2018    MCH 33 1 05/18/2018    MCHC 31 0 (L) 05/18/2018    RDW 18 1 (H) 05/18/2018    MPV 10 4 05/18/2018    NRBC 1 05/18/2018   ,   CMP:   Lab Results   Component Value Date     (H) 05/18/2018    K 3 6 05/18/2018     (H) 05/18/2018    CO2 29 05/18/2018    ANIONGAP 6 05/18/2018    BUN 35 (H) 05/18/2018    CREATININE 1 06 05/18/2018    GLUCOSE 130 05/18/2018    CALCIUM 8 3 05/18/2018    EGFR 65 05/18/2018   ,

## 2018-05-18 NOTE — ASSESSMENT & PLAN NOTE
Likely secondary to [address]  Urology input appreciated  To f/u with Urology outpatient for voiding trial

## 2018-05-18 NOTE — SOCIAL WORK
MA-51 and PASRR sent over to [address] [place] [address]  Pt is accepted to the facility and is setup for discharge tomorrow 5/19 at 10:30am  Transport setup with [place] [facility]  Spoke with Genna Babcock from guardian's office informing pt's discharge tomorrow  IMM notice explained as well as bundle notice  Copies were both faxed to the guardian's office

## 2018-05-18 NOTE — ASSESSMENT & PLAN NOTE
Malnutrition Findings:   Malnutrition type: Chronic illness  Degree of Malnutrition: Malnutrition of mild degree    BMI Findings: Body mass index is 26 11 kg/m²     Nutrition consult

## 2018-05-18 NOTE — ASSESSMENT & PLAN NOTE
Increase losartan to 100 mg daily as was taking previously, continue metoprolol to 25 mg BID  Monitor VS

## 2018-05-19 ENCOUNTER — APPOINTMENT (INPATIENT)
Dept: RADIOLOGY | Facility: HOSPITAL | Age: 83
DRG: 871 | End: 2018-05-19
Payer: MEDICARE

## 2018-05-19 PROBLEM — J69.0 ASPIRATION PNEUMONITIS (HCC): Status: ACTIVE | Noted: 2018-05-19

## 2018-05-19 PROBLEM — J44.1 CHRONIC OBSTRUCTIVE PULMONARY DISEASE WITH ACUTE EXACERBATION (HCC): Status: ACTIVE | Noted: 2018-05-19

## 2018-05-19 LAB
ANION GAP SERPL CALCULATED.3IONS-SCNC: 6 MMOL/L (ref 4–13)
BUN SERPL-MCNC: 23 MG/DL (ref 5–25)
CALCIUM SERPL-MCNC: 7.5 MG/DL (ref 8.3–10.1)
CHLORIDE SERPL-SCNC: 103 MMOL/L (ref 100–108)
CO2 SERPL-SCNC: 27 MMOL/L (ref 21–32)
CREAT SERPL-MCNC: 0.99 MG/DL (ref 0.6–1.3)
GFR SERPL CREATININE-BSD FRML MDRD: 70 ML/MIN/1.73SQ M
GLUCOSE SERPL-MCNC: 245 MG/DL (ref 65–140)
POTASSIUM SERPL-SCNC: 4.1 MMOL/L (ref 3.5–5.3)
SODIUM SERPL-SCNC: 136 MMOL/L (ref 136–145)

## 2018-05-19 PROCEDURE — 71045 X-RAY EXAM CHEST 1 VIEW: CPT

## 2018-05-19 PROCEDURE — 99233 SBSQ HOSP IP/OBS HIGH 50: CPT | Performed by: FAMILY MEDICINE

## 2018-05-19 PROCEDURE — C9113 INJ PANTOPRAZOLE SODIUM, VIA: HCPCS | Performed by: NURSE PRACTITIONER

## 2018-05-19 PROCEDURE — 80048 BASIC METABOLIC PNL TOTAL CA: CPT | Performed by: FAMILY MEDICINE

## 2018-05-19 RX ORDER — METHYLPREDNISOLONE SODIUM SUCCINATE 40 MG/ML
40 INJECTION, POWDER, LYOPHILIZED, FOR SOLUTION INTRAMUSCULAR; INTRAVENOUS EVERY 12 HOURS SCHEDULED
Status: DISCONTINUED | OUTPATIENT
Start: 2018-05-19 | End: 2018-05-20

## 2018-05-19 RX ORDER — LEVOTHYROXINE SODIUM 0.07 MG/1
75 TABLET ORAL
Refills: 0
Start: 2018-05-20

## 2018-05-19 RX ORDER — PANTOPRAZOLE SODIUM 40 MG/1
40 TABLET, DELAYED RELEASE ORAL DAILY
Refills: 0
Start: 2018-05-19

## 2018-05-19 RX ADMIN — METHYLPREDNISOLONE SODIUM SUCCINATE 40 MG: 40 INJECTION, POWDER, FOR SOLUTION INTRAMUSCULAR; INTRAVENOUS at 10:15

## 2018-05-19 RX ADMIN — LEVOTHYROXINE SODIUM 75 MCG: 75 TABLET ORAL at 05:20

## 2018-05-19 RX ADMIN — PANTOPRAZOLE SODIUM 40 MG: 40 INJECTION, POWDER, FOR SOLUTION INTRAVENOUS at 08:09

## 2018-05-19 RX ADMIN — METOPROLOL TARTRATE 25 MG: 25 TABLET ORAL at 08:10

## 2018-05-19 RX ADMIN — DOCUSATE SODIUM 100 MG: 100 CAPSULE, LIQUID FILLED ORAL at 17:11

## 2018-05-19 RX ADMIN — ESCITALOPRAM OXALATE 10 MG: 10 TABLET ORAL at 08:09

## 2018-05-19 RX ADMIN — PANTOPRAZOLE SODIUM 40 MG: 40 INJECTION, POWDER, FOR SOLUTION INTRAVENOUS at 21:23

## 2018-05-19 RX ADMIN — QUETIAPINE FUMARATE 25 MG: 25 TABLET ORAL at 17:11

## 2018-05-19 RX ADMIN — METHYLPREDNISOLONE SODIUM SUCCINATE 40 MG: 40 INJECTION, POWDER, FOR SOLUTION INTRAMUSCULAR; INTRAVENOUS at 21:23

## 2018-05-19 RX ADMIN — QUETIAPINE FUMARATE 25 MG: 25 TABLET ORAL at 08:09

## 2018-05-19 RX ADMIN — LOSARTAN POTASSIUM 100 MG: 50 TABLET, FILM COATED ORAL at 08:09

## 2018-05-19 RX ADMIN — TAMSULOSIN HYDROCHLORIDE 0.4 MG: 0.4 CAPSULE ORAL at 17:11

## 2018-05-19 RX ADMIN — ATORVASTATIN CALCIUM 20 MG: 20 TABLET, FILM COATED ORAL at 17:11

## 2018-05-19 RX ADMIN — DOCUSATE SODIUM 100 MG: 100 CAPSULE, LIQUID FILLED ORAL at 08:09

## 2018-05-19 NOTE — ASSESSMENT & PLAN NOTE
And mild exacerbation suspect secondary to aspiration  Diffuse wheezing, started on IV steroid  Respiratory protocol  Supplemental oxygen  Monitor closely

## 2018-05-19 NOTE — PROGRESS NOTES
Progress Note - Mariana Rome 1934, 80 y o  male MRN: [id number]    Unit/Bed#: E5 -01 Encounter: [id number]    Primary Care Provider: Be Parks MD   Date and time admitted to hospital: 5/11/2018  9:36 AM        * Sepsis [facility])   Assessment & Plan    Patient was initially in septic shock requiring pressors now resolved  Secondary to UTI, no urine cultures available, repeat cultures no growth suggesting Rocephin effective  Completed 7/7 course of Abx - Rocephin then Ceftin last day 5/17   Monitor CBC/VS          Chronic obstructive pulmonary disease with acute exacerbation [facility])   Assessment & Plan    And mild exacerbation suspect secondary to aspiration  Diffuse wheezing, started on IV steroid  Respiratory protocol  Supplemental oxygen  Monitor closely        Aspiration pneumonitis [facility])   Assessment & Plan    Is evident and worsening of respiratory status, infiltrate on portable chest x-ray 5/19  Will place on respiratory protocol  Monitor clinically, hold off from antibiotics at this time as afebrile, minimal elevation in WBC          Acute hypernatremia   Assessment & Plan    Na stable at 148  Increased D5W to 100 mL/hr  Continue to monitor BMP        Macrocytic anemia   Assessment & Plan    Along with heme-positive stools, likely multifactorial anemia with workup consisted with possible MDS as well as anemia of chronic disease  Patient's H&H remains stable around 9, discussed with GI, no further workup planned        Acute cystitis with hematuria   Assessment & Plan    Urine cultures not obtained prior to antibiotics, available urine cultures reveal no growth, suggestive UTI susceptible to Rocephin which was then transitioned to Ceftin  Patient completed 7 day antibiotic course 5/17  Will monitor CBC/vital signs        Other dysphagia   Assessment & Plan    Speech therapy evaluated and diet adjusted to pureed/nectar thickened liquids  Patient's respiratory status worsened today, evidence of right-sided infiltrate on chest x-ray suspicious for aspiration pneumonitis  Continue aspiration precautions, modified diet, monitor closely        Hypokalemia   Assessment & Plan    Resolved with replacement  Monitor BMP          Mild protein-calorie malnutrition ([facility] [address] )   Assessment & Plan    Malnutrition Findings:   Malnutrition type: Chronic illness  Degree of Malnutrition: Malnutrition of mild degree    BMI Findings: Body mass index is 26 11 kg/m²  Nutrition consult  Encourage nutritional supplements and free water intake        Essential hypertension   Assessment & Plan    Continue losartan to 100 mg daily and metoprolol to 25 mg BID  Monitor VS        Coronary artery disease involving native coronary artery of native heart   Assessment & Plan    Patient on metoprolol and atorvastatin  Plavix discontinued due to anemia        Dementia with behavioral disturbance   Assessment & Plan    Plan to discharge to [facility] once medically appropriate  Unfortunately, initial discharge plan for today was delayed due to development of worsening dyspnea and wheezing   Ongoing update of contact         MARQUISE (acute kidney injury) ([facility] [address] )   Assessment & Plan    Resolved with Cr at baseline  Monitor daily BMP  Avoid nephrotoxins          VTE Pharmacologic Prophylaxis:   Pharmacologic: None due to anemia  Mechanical VTE Prophylaxis in Place: Yes    Patient Centered Rounds: I have performed bedside rounds with nursing staff today  Discussions with Specialists or Other Care Team Provider: Reviewed chart    Education and Discussions with Family / Patient: Orvel Cava    Time Spent for Care: 45 minutes  More than 50% of total time spent on counseling and coordination of care as described above      Current Length of Stay: 8 day(s)    Current Patient Status: Inpatient   Certification Statement: The patient will continue to require additional inpatient hospital stay due to need for IV steroids, close monitoring    Discharge Plan: TBD    Code Status: Level 3 - DNAR and DNI      Subjective:   Patient seen and examined  He is noted for mild respiratory distress and wheezing on gross exam   He denies any chest pain or palpitations, he is unable to provide meaningful history due to underlying dementia  Per nursing staff, developed wheezing earlier today  Objective:     Vitals:   Temp (24hrs), Av 8 °F (36 6 °C), Min:97 6 °F (36 4 °C), Max:98 °F (36 7 °C)    HR:  [67-94] 69  Resp:  [18-20] 20  BP: (115-157)/(57-73) 130/68  SpO2:  [96 %-99 %] 96 %  Body mass index is 26 11 kg/m²  Input and Output Summary (last 24 hours): Intake/Output Summary (Last 24 hours) at 18 1125  Last data filed at 18 0524   Gross per 24 hour   Intake              120 ml   Output              500 ml   Net             -380 ml       Physical Exam:     Physical Exam   Constitutional: He appears distressed (Mild respiratory distress)  Neck: No JVD present  Cardiovascular: Normal rate and regular rhythm  No murmur heard  Pulmonary/Chest: He is in respiratory distress  He has wheezes (Diffuse)  He has rales (Diffuse)  Abdominal: Soft  He exhibits no distension  There is no tenderness  There is no guarding  Musculoskeletal: He exhibits no edema  Neurological: He is alert  He exhibits normal muscle tone  Skin: Skin is warm and dry  Psychiatric: His speech is delayed  He is slowed and withdrawn  Cognition and memory are impaired         Additional Data:     Labs:      Results from last 7 days  Lab Units 18  0640  18  0619   WBC Thousand/uL 10 88*  < > 8 95   HEMOGLOBIN g/dL 9 2*  < > 9 0*   HEMATOCRIT % 29 7*  < > 28 3*   PLATELETS Thousands/uL 205  < > 197   NEUTROS PCT %  --   --  88*   LYMPHS PCT %  --   --  7*   LYMPHO PCT % 12*  < >  --    MONOS PCT %  --   --  4   MONO PCT MAN % 0*  < >  --    EOS PCT %  --   --  1   EOSINO PCT MANUAL % 2  < >  --    < > = values in this interval not displayed  Results from last 7 days  Lab Units 05/18/18  0640  05/15/18  0514   SODIUM mmol/L 148*  < > 148*   POTASSIUM mmol/L 3 6  < > 3 8   CHLORIDE mmol/L 113*  < > 111*   CO2 mmol/L 29  < > 24   BUN mg/dL 35*  < > 32*   CREATININE mg/dL 1 06  < > 1 24   CALCIUM mg/dL 8 3  < > 8 7   TOTAL PROTEIN g/dL  --   --  7 6   BILIRUBIN TOTAL mg/dL  --   --  0 98   ALK PHOS U/L  --   --  70   ALT U/L  --   --  47   AST U/L  --   --  35   GLUCOSE RANDOM mg/dL 130  < > 129   < > = values in this interval not displayed  * I Have Reviewed All Lab Data Listed Above  * Additional Pertinent Lab Tests Reviewed:  [address] [address] Admission Reviewed    Imaging:    Imaging Reports Reviewed Today Include: chest xray portable  Imaging Personally Reviewed by Myself Includes:  As above    Recent Cultures (last 7 days):           Last 24 Hours Medication List:     Current Facility-Administered Medications:  acetaminophen 650 mg Oral Q6H PRN Hari Sahu, CRNP    atorvastatin 20 mg Oral Daily With MICHAEL Watson    dextrose 100 mL/hr Intravenous Continuous Jayden Isaacs MD Last Rate: 100 mL/hr (05/19/18 1014)   docusate sodium 100 mg Oral BID MICHAEL Rodriguez    escitalopram 10 mg Oral Daily MICHAEL Rodriguez    levothyroxine 75 mcg Oral Early Morning Jasmina Lorenzana MD    lidocaine 1 patch Transdermal Daily MICHAEL Rodriguez    losartan 100 mg Oral Daily Jayden Isaacs MD    methylPREDNISolone sodium succinate 40 mg Intravenous Q12H [facility], MD    metoprolol tartrate 25 mg Oral Q12H [address] Jayden Isaacs MD    pantoprazole 40 mg Oral Daily PRN Hari Sahu, MICHAEL    pantoprazole 40 mg Intravenous Q12H [address] Lethaniel Copas Spatzer, CRNP    QUEtiapine 25 mg Oral BID MICHAEL Rodriguez    tamsulosin 0 4 mg Oral Daily With Daniel Jordan MD         Today, Patient Was Seen By: Roberto Carlos Ceja MD    ** Please Note: Dictation voice to text software may have been used in the creation of this document   **

## 2018-05-19 NOTE — ASSESSMENT & PLAN NOTE
Plan to discharge to [place] once medically appropriate  Unfortunately, initial discharge plan for today was delayed due to development of worsening dyspnea and wheezing   Ongoing update of contact

## 2018-05-19 NOTE — ASSESSMENT & PLAN NOTE
Is evident and worsening of respiratory status, infiltrate on portable chest x-ray 5/19  Will place on respiratory protocol  Monitor clinically, hold off from antibiotics at this time as afebrile, minimal elevation in WBC

## 2018-05-19 NOTE — ASSESSMENT & PLAN NOTE
Speech therapy evaluated and diet adjusted to pureed/nectar thickened liquids  Patient's respiratory status worsened today, evidence of right-sided infiltrate on chest x-ray suspicious for aspiration pneumonitis  Continue aspiration precautions, modified diet, monitor closely

## 2018-05-19 NOTE — ASSESSMENT & PLAN NOTE
Urine cultures not obtained prior to antibiotics, available urine cultures reveal no growth, suggestive UTI susceptible to Rocephin which was then transitioned to Ceftin  Patient completed 7 day antibiotic course 5/17  Will monitor CBC/vital signs

## 2018-05-19 NOTE — ASSESSMENT & PLAN NOTE
Malnutrition Findings:   Malnutrition type: Chronic illness  Degree of Malnutrition: Malnutrition of mild degree    BMI Findings: Body mass index is 26 11 kg/m²     Nutrition consult  Encourage nutritional supplements and free water intake

## 2018-05-19 NOTE — ASSESSMENT & PLAN NOTE
Patient was initially in septic shock requiring pressors now resolved  Secondary to UTI, no urine cultures available, repeat cultures no growth suggesting Rocephin effective  Completed 7/7 course of Abx - Rocephin then Ceftin last day 5/17   Monitor CBC/VS

## 2018-05-20 LAB
BASOPHILS # BLD AUTO: 0.01 THOUSANDS/ΜL (ref 0–0.1)
BASOPHILS NFR BLD AUTO: 0 % (ref 0–1)
EOSINOPHIL # BLD AUTO: 0 THOUSAND/ΜL (ref 0–0.61)
EOSINOPHIL NFR BLD AUTO: 0 % (ref 0–6)
ERYTHROCYTE [DISTWIDTH] IN BLOOD BY AUTOMATED COUNT: 17.4 % (ref 11.6–15.1)
HCT VFR BLD AUTO: 30.5 % (ref 36.5–49.3)
HGB BLD-MCNC: 9.7 G/DL (ref 12–17)
LYMPHOCYTES # BLD AUTO: 0.43 THOUSANDS/ΜL (ref 0.6–4.47)
LYMPHOCYTES NFR BLD AUTO: 4 % (ref 14–44)
MCH RBC QN AUTO: 33.3 PG (ref 26.8–34.3)
MCHC RBC AUTO-ENTMCNC: 31.8 G/DL (ref 31.4–37.4)
MCV RBC AUTO: 105 FL (ref 82–98)
MONOCYTES # BLD AUTO: 0.13 THOUSAND/ΜL (ref 0.17–1.22)
MONOCYTES NFR BLD AUTO: 1 % (ref 4–12)
NEUTROPHILS # BLD AUTO: 10.09 THOUSANDS/ΜL (ref 1.85–7.62)
NEUTS SEG NFR BLD AUTO: 95 % (ref 43–75)
NRBC BLD AUTO-RTO: 0 /100 WBCS
PLATELET # BLD AUTO: 191 THOUSANDS/UL (ref 149–390)
PMV BLD AUTO: 11.2 FL (ref 8.9–12.7)
RBC # BLD AUTO: 2.91 MILLION/UL (ref 3.88–5.62)
WBC # BLD AUTO: 10.66 THOUSAND/UL (ref 4.31–10.16)

## 2018-05-20 PROCEDURE — 85025 COMPLETE CBC W/AUTO DIFF WBC: CPT | Performed by: FAMILY MEDICINE

## 2018-05-20 PROCEDURE — 99232 SBSQ HOSP IP/OBS MODERATE 35: CPT | Performed by: FAMILY MEDICINE

## 2018-05-20 PROCEDURE — C9113 INJ PANTOPRAZOLE SODIUM, VIA: HCPCS | Performed by: NURSE PRACTITIONER

## 2018-05-20 RX ORDER — METHYLPREDNISOLONE SODIUM SUCCINATE 40 MG/ML
20 INJECTION, POWDER, LYOPHILIZED, FOR SOLUTION INTRAMUSCULAR; INTRAVENOUS EVERY 12 HOURS SCHEDULED
Status: DISCONTINUED | OUTPATIENT
Start: 2018-05-20 | End: 2018-05-21

## 2018-05-20 RX ADMIN — METOPROLOL TARTRATE 25 MG: 25 TABLET ORAL at 22:42

## 2018-05-20 RX ADMIN — ESCITALOPRAM OXALATE 10 MG: 10 TABLET ORAL at 09:48

## 2018-05-20 RX ADMIN — QUETIAPINE FUMARATE 25 MG: 25 TABLET ORAL at 09:48

## 2018-05-20 RX ADMIN — TAMSULOSIN HYDROCHLORIDE 0.4 MG: 0.4 CAPSULE ORAL at 17:09

## 2018-05-20 RX ADMIN — METOPROLOL TARTRATE 25 MG: 25 TABLET ORAL at 09:48

## 2018-05-20 RX ADMIN — LEVOTHYROXINE SODIUM 75 MCG: 75 TABLET ORAL at 06:14

## 2018-05-20 RX ADMIN — ATORVASTATIN CALCIUM 20 MG: 20 TABLET, FILM COATED ORAL at 17:09

## 2018-05-20 RX ADMIN — DOCUSATE SODIUM 100 MG: 100 CAPSULE, LIQUID FILLED ORAL at 09:48

## 2018-05-20 RX ADMIN — LOSARTAN POTASSIUM 100 MG: 50 TABLET, FILM COATED ORAL at 09:48

## 2018-05-20 RX ADMIN — METHYLPREDNISOLONE SODIUM SUCCINATE 40 MG: 40 INJECTION, POWDER, FOR SOLUTION INTRAMUSCULAR; INTRAVENOUS at 09:48

## 2018-05-20 RX ADMIN — PANTOPRAZOLE SODIUM 40 MG: 40 INJECTION, POWDER, FOR SOLUTION INTRAVENOUS at 22:43

## 2018-05-20 RX ADMIN — METHYLPREDNISOLONE SODIUM SUCCINATE 20 MG: 40 INJECTION, POWDER, FOR SOLUTION INTRAMUSCULAR; INTRAVENOUS at 22:42

## 2018-05-20 RX ADMIN — LIDOCAINE 1 PATCH: 50 PATCH CUTANEOUS at 09:49

## 2018-05-20 RX ADMIN — PANTOPRAZOLE SODIUM 40 MG: 40 INJECTION, POWDER, FOR SOLUTION INTRAVENOUS at 09:48

## 2018-05-20 RX ADMIN — QUETIAPINE FUMARATE 25 MG: 25 TABLET ORAL at 17:09

## 2018-05-20 NOTE — ASSESSMENT & PLAN NOTE
Plan to discharge to [address] [place] [address] once medically appropriate  Ongoing update of contact POA

## 2018-05-20 NOTE — PROGRESS NOTES
Progress Note - Fadumo Olvera 1934, 80 y o  male MRN: [id number]    Unit/Bed#: E5 -01 Encounter: [id number]    Primary Care Provider: Dante Figueroa MD   Date and time admitted to hospital: 5/11/2018  9:36 AM        Aspiration pneumonitis [facility])   Assessment & Plan    Is evident and worsening of respiratory status, infiltrate on portable chest x-ray 5/19  Will place on respiratory protocol  Monitor clinically, hold off from antibiotics at this time as afebrile, minimal elevation in WBC          * Sepsis [facility])   Assessment & Plan    Patient was initially in septic shock requiring pressors now resolved  Secondary to UTI, no urine cultures available, repeat cultures no growth suggesting Rocephin effective  Completed 7/7 course of Abx - Rocephin then Ceftin last day 5/17   Monitor CBC/VS          Chronic obstructive pulmonary disease with acute exacerbation (HCC)   Assessment & Plan    Im mild exacerbation suspect secondary to aspiration  Diffuse wheezing, started on IV steroid, taper down to Solu-Medrol 20 mg IV b i d    Respiratory protocol  Supplemental oxygen  Monitor closely        Acute hypernatremia   Assessment & Plan    Resolved with D5W, discontinued  Continue to monitor BMP        Macrocytic anemia   Assessment & Plan    Along with heme-positive stools, likely multifactorial anemia with workup consisted with possible MDS as well as anemia of chronic disease  Patient's H&H remains stable around 9, discussed with GI, no further workup planned        Acute cystitis with hematuria   Assessment & Plan    Urine cultures not obtained prior to antibiotics, available urine cultures reveal no growth, suggestive UTI susceptible to Rocephin which was then transitioned to Ceftin  Patient completed 7 day antibiotic course 5/17  Will monitor CBC/vital signs        Other dysphagia   Assessment & Plan    Speech therapy evaluated and diet adjusted to pureed/nectar thickened liquids  Patient's respiratory status worsened yesterday, evidence of right-sided infiltrate on chest x-ray suspicious for aspiration pneumonitis  Continue aspiration precautions, modified diet, monitor closely        Hypokalemia   Assessment & Plan    Resolved with replacement  Monitor BMP          Urinary retention   Assessment & Plan    Likely secondary to [address]  Urology input appreciated  To f/u with Urology outpatient for voiding trial        Essential hypertension   Assessment & Plan    Continue losartan to 100 mg daily and metoprolol to 25 mg BID  Monitor VS        Coronary artery disease involving native coronary artery of native heart   Assessment & Plan    Patient on metoprolol and atorvastatin  Plavix discontinued due to anemia        Dementia with behavioral disturbance   Assessment & Plan    Plan to discharge to [facility] once medically appropriate  Ongoing update of contact POA        MARQUISE (acute kidney injury) ([facility] [address] )   Assessment & Plan    Resolved with Cr at baseline  Monitor daily BMP  Avoid nephrotoxins          VTE Pharmacologic Prophylaxis:   Pharmacologic: None due to anemia  Mechanical VTE Prophylaxis in Place: Yes    Patient Centered Rounds: I have performed bedside rounds with nursing staff today  Discussions with Specialists or Other Care Team Provider:   Reviewed chart    Education and Discussions with Family / Patient:  Ongoing update of patient's [address]    Time Spent for Care: 30 minutes  More than 50% of total time spent on counseling and coordination of care as described above  Current Length of Stay: 9 day(s)    Current Patient Status: Inpatient   Certification Statement: The patient will continue to require additional inpatient hospital stay due to need for close monitoring due to aspiration    Discharge Plan: 2-3 days    Code Status: Level 3 - DNAR and DNI      Subjective:   Patient seen and examined  He appears more comfortable and not in acute respiratory distress    He denies any discomfort but is a poor historian in setting of underlying dementia  Objective:     Vitals:   Temp (24hrs), Av 6 °F (36 4 °C), Min:97 2 °F (36 2 °C), Max:98 1 °F (36 7 °C)    HR:  [74-85] 74  Resp:  [18-20] 20  BP: (105-119)/(58-89) 111/58  SpO2:  [94 %-99 %] 97 %  Body mass index is 24 91 kg/m²  Input and Output Summary (last 24 hours): Intake/Output Summary (Last 24 hours) at 18 1608  Last data filed at 18 1300   Gross per 24 hour   Intake              360 ml   Output              600 ml   Net             -240 ml       Physical Exam:     Physical Exam   Constitutional: No distress  HENT:   Head: Normocephalic and atraumatic  Eyes: Conjunctivae are normal    Neck: No JVD present  Cardiovascular: Normal rate and regular rhythm  No murmur heard  Pulmonary/Chest: Effort normal  No respiratory distress  He has wheezes (diffuse)  He has rales (diffuse)  Abdominal: Soft  He exhibits no distension  There is no tenderness  There is no guarding  Musculoskeletal: He exhibits no edema  Neurological: He is alert  Skin: Skin is warm and dry  Psychiatric: His speech is delayed  Cognition and memory are impaired  Additional Data:     Labs:      Results from last 7 days  Lab Units 18  0542   WBC Thousand/uL 10 66*   HEMOGLOBIN g/dL 9 7*   HEMATOCRIT % 30 5*   PLATELETS Thousands/uL 191   NEUTROS PCT % 95*   LYMPHS PCT % 4*   MONOS PCT % 1*   EOS PCT % 0       Results from last 7 days  Lab Units 18  1207  05/15/18  0514   SODIUM mmol/L 136  < > 148*   POTASSIUM mmol/L 4 1  < > 3 8   CHLORIDE mmol/L 103  < > 111*   CO2 mmol/L 27  < > 24   BUN mg/dL 23  < > 32*   CREATININE mg/dL 0 99  < > 1 24   CALCIUM mg/dL 7 5*  < > 8 7   TOTAL PROTEIN g/dL  --   --  7 6   BILIRUBIN TOTAL mg/dL  --   --  0 98   ALK PHOS U/L  --   --  70   ALT U/L  --   --  47   AST U/L  --   --  35   GLUCOSE RANDOM mg/dL 245*  < > 129   < > = values in this interval not displayed                    * I Have Reviewed All Lab Data Listed Above  * Additional Pertinent Lab Tests Reviewed: [address] Admission Reviewed    Imaging:    Imaging Reports Reviewed Today Include: chest xray      Recent Cultures (last 7 days):           Last 24 Hours Medication List:     Current Facility-Administered Medications:  acetaminophen 650 mg Oral Q6H PRN Dominga Martin, CRNP   atorvastatin 20 mg Oral Daily With MICHAEL Feliciano   docusate sodium 100 mg Oral BID Indigoedi Martin, CRNP   escitalopram 10 mg Oral Daily Dominga Gibsonuce, CRNP   levothyroxine 75 mcg Oral Early Morning Rosana Bobo MD   lidocaine 1 patch Transdermal Daily Dominga Martin, CRNP   losartan 100 mg Oral Daily Safia Vergara MD   methylPREDNISolone sodium succinate 20 mg Intravenous Q12H [facility], MD   metoprolol tartrate 25 mg Oral Q12H [facility] Safia Vergara MD   pantoprazole 40 mg Oral Daily PRN Dominga Martin, CRLEIA   pantoprazole 40 mg Intravenous Q12H [facility] Robert W Spatzer, CRNP   QUEtiapine 25 mg Oral BID Dominga Martin, MICHAEL   tamsulosin 0 4 mg Oral Daily With Mele Funes MD        Today, Patient Was Seen By: Miguelina Larios MD    ** Please Note: Dictation voice to text software may have been used in the creation of this document   **

## 2018-05-20 NOTE — ASSESSMENT & PLAN NOTE
Im mild exacerbation suspect secondary to aspiration  Diffuse wheezing, started on IV steroid, taper down to Solu-Medrol 20 mg IV b i d    Respiratory protocol  Supplemental oxygen  Monitor closely

## 2018-05-20 NOTE — PLAN OF CARE
Problem: Prexisting or High Potential for Compromised Skin Integrity  Goal: Skin integrity is maintained or improved  INTERVENTIONS:  - Identify patients at risk for skin breakdown  - Assess and monitor skin integrity  - Assess and monitor nutrition and hydration status  - Monitor labs (i e  albumin)  - Assess for incontinence   - Turn and reposition patient  - Assist with mobility/ambulation  - Relieve pressure over bony prominences  - Avoid friction and shearing  - Provide appropriate hygiene as needed including keeping skin clean and dry  - Evaluate need for skin moisturizer/barrier cream  - Collaborate with interdisciplinary team (i e  Nutrition, Rehabilitation, etc )   - Patient/family teaching   Outcome: Progressing      Problem: GENITOURINARY - ADULT  Goal: Maintains or returns to baseline urinary function  INTERVENTIONS:  - Assess urinary function  - Encourage oral fluids to ensure adequate hydration  - Administer IV fluids as ordered to ensure adequate hydration  - Administer ordered medications as needed  - Offer frequent toileting  - Follow urinary retention protocol if ordered   Outcome: Progressing    Goal: Absence of urinary retention  INTERVENTIONS:  - Assess patients ability to void and empty bladder  - Monitor I/O  - Bladder scan as needed  - Discuss with physician/AP medications to alleviate retention as needed  - Discuss catheterization for long term situations as appropriate   Outcome: Progressing      Problem: METABOLIC, FLUID AND ELECTROLYTES - ADULT  Goal: Electrolytes maintained within normal limits  INTERVENTIONS:  - Monitor labs and assess patient for signs and symptoms of electrolyte imbalances  - Administer electrolyte replacement as ordered  - Monitor response to electrolyte replacements, including repeat lab results as appropriate  - Instruct patient on fluid and nutrition as appropriate   Outcome: Progressing    Goal: Fluid balance maintained  INTERVENTIONS:  - Monitor labs and assess for signs and symptoms of volume excess or deficit  - Monitor I/O and WT  - Instruct patient on fluid and nutrition as appropriate   Outcome: Progressing      Problem: SKIN/TISSUE INTEGRITY - ADULT  Goal: Skin integrity remains intact  INTERVENTIONS  - Identify patients at risk for skin breakdown  - Assess and monitor skin integrity  - Assess and monitor nutrition and hydration status  - Monitor labs (i e  albumin)  - Assess for incontinence   - Turn and reposition patient  - Assist with mobility/ambulation  - Relieve pressure over bony prominences  - Avoid friction and shearing  - Provide appropriate hygiene as needed including keeping skin clean and dry  - Evaluate need for skin moisturizer/barrier cream  - Collaborate with interdisciplinary team (i e  Nutrition, Rehabilitation, etc )   - Patient/family teaching   Outcome: Progressing    Goal: Oral mucous membranes remain intact  INTERVENTIONS  - Assess oral mucosa and hygiene practices  - Implement preventative oral hygiene regimen  - Implement oral medicated treatments as ordered  - Initiate Nutrition services referral as needed   Outcome: Progressing      Problem: HEMATOLOGIC - ADULT  Goal: Maintains hematologic stability  INTERVENTIONS  - Assess for signs and symptoms of bleeding or hemorrhage  - Monitor labs  - Administer supportive blood products/factors as ordered and appropriate   Outcome: Progressing      Problem: MUSCULOSKELETAL - ADULT  Goal: Maintain or return mobility to safest level of function  INTERVENTIONS:  - Assess patient's ability to carry out ADLs; assess patient's baseline for ADL function and identify physical deficits which impact ability to perform ADLs (bathing, care of mouth/teeth, toileting, grooming, dressing, etc )  - Assess/evaluate cause of self-care deficits   - Assess range of motion  - Assess patient's mobility; develop plan if impaired  - Assess patient's need for assistive devices and provide as appropriate  - Encourage maximum independence but intervene and supervise when necessary  - Involve family in performance of ADLs  - Assess for home care needs following discharge   - Request OT consult to assist with ADL evaluation and planning for discharge  - Provide patient education as appropriate   Outcome: Progressing      Problem: Potential for Falls  Goal: Patient will remain free of falls  INTERVENTIONS:  - Assess patient frequently for physical needs  -  Identify cognitive and physical deficits and behaviors that affect risk of falls  -  [place] fall precautions as indicated by assessment   - Educate patient/family on patient safety including physical limitations  - Instruct patient to call for assistance with activity based on assessment  - Modify environment to reduce risk of injury  - Consider OT/PT consult to assist with strengthening/mobility   Outcome: Progressing      Problem: Nutrition/Hydration-ADULT  Goal: Nutrient/Hydration intake appropriate for improving, restoring or maintaining nutritional needs  Monitor and assess patient's nutrition/hydration status for malnutrition (ex- brittle hair, bruises, dry skin, pale skin and conjunctiva, muscle wasting, smooth red tongue, and disorientation)  Collaborate with interdisciplinary team and initiate plan and interventions as ordered  Monitor patient's weight and dietary intake as ordered or per policy  Utilize nutrition screening tool and intervene per policy  Determine patient's food preferences and provide high-protein, high-caloric foods as appropriate       INTERVENTIONS:  - Monitor oral intake, urinary output, labs, and treatment plans  - Assess nutrition and hydration status and recommend course of action  - Evaluate amount of meals eaten  - Assist patient with eating if necessary   - Allow adequate time for meals  - Recommend/ encourage appropriate diets, oral nutritional supplements, and vitamin/mineral supplements  - Order, calculate, and assess calorie counts as needed  - Recommend, monitor, and adjust tube feedings and TPN/PPN based on assessed needs  - Assess need for intravenous fluids  - Provide specific nutrition/hydration education as appropriate  - Include patient/family/caregiver in decisions related to nutrition   Outcome: Progressing      Problem: DISCHARGE PLANNING - CARE MANAGEMENT  Goal: Discharge to post-acute care or home with appropriate resources  INTERVENTIONS:  - Conduct assessment to determine patient/family and health care team treatment goals, and need for post-acute services based on payer coverage, community resources, and patient preferences, and barriers to discharge  - Address psychosocial, clinical, and financial barriers to discharge as identified in assessment in conjunction with the patient/family and health care team  - Arrange appropriate level of post-acute services according to patients   needs and preference and payer coverage in collaboration with the physician and health care team  - Communicate with and update the patient/family, physician, and health care team regarding progress on the discharge plan  - Arrange appropriate transportation to post-acute venues   Outcome: Progressing

## 2018-05-20 NOTE — ASSESSMENT & PLAN NOTE
Likely secondary to [address]  Urology input appreciated  To f/u with Urology outpatient for voiding trial

## 2018-05-20 NOTE — ASSESSMENT & PLAN NOTE
Speech therapy evaluated and diet adjusted to pureed/nectar thickened liquids  Patient's respiratory status worsened yesterday, evidence of right-sided infiltrate on chest x-ray suspicious for aspiration pneumonitis  Continue aspiration precautions, modified diet, monitor closely

## 2018-05-21 ENCOUNTER — APPOINTMENT (INPATIENT)
Dept: RADIOLOGY | Facility: HOSPITAL | Age: 83
DRG: 871 | End: 2018-05-21
Payer: MEDICARE

## 2018-05-21 PROBLEM — E87.6 HYPOKALEMIA: Status: RESOLVED | Noted: 2018-05-14 | Resolved: 2018-05-21

## 2018-05-21 LAB
ANION GAP SERPL CALCULATED.3IONS-SCNC: 6 MMOL/L (ref 4–13)
ANISOCYTOSIS BLD QL SMEAR: PRESENT
BASOPHILS # BLD MANUAL: 0 THOUSAND/UL (ref 0–0.1)
BASOPHILS NFR MAR MANUAL: 0 % (ref 0–1)
BUN SERPL-MCNC: 31 MG/DL (ref 5–25)
CALCIUM SERPL-MCNC: 8.2 MG/DL (ref 8.3–10.1)
CHLORIDE SERPL-SCNC: 106 MMOL/L (ref 100–108)
CO2 SERPL-SCNC: 27 MMOL/L (ref 21–32)
CREAT SERPL-MCNC: 1 MG/DL (ref 0.6–1.3)
EOSINOPHIL # BLD MANUAL: 0 THOUSAND/UL (ref 0–0.4)
EOSINOPHIL NFR BLD MANUAL: 0 % (ref 0–6)
ERYTHROCYTE [DISTWIDTH] IN BLOOD BY AUTOMATED COUNT: 17.8 % (ref 11.6–15.1)
GFR SERPL CREATININE-BSD FRML MDRD: 69 ML/MIN/1.73SQ M
GLUCOSE SERPL-MCNC: 157 MG/DL (ref 65–140)
HCT VFR BLD AUTO: 30.1 % (ref 36.5–49.3)
HGB BLD-MCNC: 9.5 G/DL (ref 12–17)
LYMPHOCYTES # BLD AUTO: 0.3 THOUSAND/UL (ref 0.6–4.47)
LYMPHOCYTES # BLD AUTO: 2 % (ref 14–44)
MCH RBC QN AUTO: 33.3 PG (ref 26.8–34.3)
MCHC RBC AUTO-ENTMCNC: 31.6 G/DL (ref 31.4–37.4)
MCV RBC AUTO: 106 FL (ref 82–98)
MONOCYTES # BLD AUTO: 0.15 THOUSAND/UL (ref 0–1.22)
MONOCYTES NFR BLD: 1 % (ref 4–12)
NEUTROPHILS # BLD MANUAL: 14.67 THOUSAND/UL (ref 1.85–7.62)
NEUTS BAND NFR BLD MANUAL: 2 % (ref 0–8)
NEUTS SEG NFR BLD AUTO: 95 % (ref 43–75)
NRBC BLD AUTO-RTO: 1 /100 WBCS
PLATELET # BLD AUTO: 211 THOUSANDS/UL (ref 149–390)
PLATELET BLD QL SMEAR: ADEQUATE
PMV BLD AUTO: 11.4 FL (ref 8.9–12.7)
POLYCHROMASIA BLD QL SMEAR: PRESENT
POTASSIUM SERPL-SCNC: 4.6 MMOL/L (ref 3.5–5.3)
RBC # BLD AUTO: 2.85 MILLION/UL (ref 3.88–5.62)
SODIUM SERPL-SCNC: 139 MMOL/L (ref 136–145)
TOTAL CELLS COUNTED SPEC: 100
WBC # BLD AUTO: 15.12 THOUSAND/UL (ref 4.31–10.16)

## 2018-05-21 PROCEDURE — 99232 SBSQ HOSP IP/OBS MODERATE 35: CPT | Performed by: INTERNAL MEDICINE

## 2018-05-21 PROCEDURE — C9113 INJ PANTOPRAZOLE SODIUM, VIA: HCPCS | Performed by: NURSE PRACTITIONER

## 2018-05-21 PROCEDURE — 85027 COMPLETE CBC AUTOMATED: CPT | Performed by: FAMILY MEDICINE

## 2018-05-21 PROCEDURE — 85007 BL SMEAR W/DIFF WBC COUNT: CPT | Performed by: FAMILY MEDICINE

## 2018-05-21 PROCEDURE — 92611 MOTION FLUOROSCOPY/SWALLOW: CPT

## 2018-05-21 PROCEDURE — 80048 BASIC METABOLIC PNL TOTAL CA: CPT | Performed by: FAMILY MEDICINE

## 2018-05-21 PROCEDURE — 74230 X-RAY XM SWLNG FUNCJ C+: CPT

## 2018-05-21 RX ORDER — PREDNISONE 20 MG/1
40 TABLET ORAL DAILY
Status: DISCONTINUED | OUTPATIENT
Start: 2018-05-21 | End: 2018-05-23 | Stop reason: HOSPADM

## 2018-05-21 RX ADMIN — METOPROLOL TARTRATE 25 MG: 25 TABLET ORAL at 21:01

## 2018-05-21 RX ADMIN — TAMSULOSIN HYDROCHLORIDE 0.4 MG: 0.4 CAPSULE ORAL at 17:30

## 2018-05-21 RX ADMIN — PANTOPRAZOLE SODIUM 40 MG: 40 INJECTION, POWDER, FOR SOLUTION INTRAVENOUS at 21:02

## 2018-05-21 RX ADMIN — METOPROLOL TARTRATE 25 MG: 25 TABLET ORAL at 08:01

## 2018-05-21 RX ADMIN — LOSARTAN POTASSIUM 100 MG: 50 TABLET, FILM COATED ORAL at 08:01

## 2018-05-21 RX ADMIN — LEVOTHYROXINE SODIUM 75 MCG: 75 TABLET ORAL at 05:54

## 2018-05-21 RX ADMIN — PREDNISONE 40 MG: 20 TABLET ORAL at 13:30

## 2018-05-21 RX ADMIN — ESCITALOPRAM OXALATE 10 MG: 10 TABLET ORAL at 08:01

## 2018-05-21 RX ADMIN — ATORVASTATIN CALCIUM 20 MG: 20 TABLET, FILM COATED ORAL at 17:30

## 2018-05-21 RX ADMIN — LIDOCAINE 1 PATCH: 50 PATCH CUTANEOUS at 08:01

## 2018-05-21 RX ADMIN — PANTOPRAZOLE SODIUM 40 MG: 40 INJECTION, POWDER, FOR SOLUTION INTRAVENOUS at 08:01

## 2018-05-21 RX ADMIN — METHYLPREDNISOLONE SODIUM SUCCINATE 20 MG: 40 INJECTION, POWDER, FOR SOLUTION INTRAMUSCULAR; INTRAVENOUS at 08:01

## 2018-05-21 RX ADMIN — QUETIAPINE FUMARATE 25 MG: 25 TABLET ORAL at 08:01

## 2018-05-21 RX ADMIN — QUETIAPINE FUMARATE 25 MG: 25 TABLET ORAL at 17:34

## 2018-05-21 NOTE — ASSESSMENT & PLAN NOTE
Is evident and worsening of respiratory status, infiltrate on portable chest x-ray 5/19  Will place on respiratory protocol  Hold on further antibiotics  Pt will need repeat cxr in 3 to 4 weeks     Wean oxygen as tolerated

## 2018-05-21 NOTE — ASSESSMENT & PLAN NOTE
Im mild exacerbation suspect secondary to aspiration  Resolving  Will change solumedrol to prednisone to continue taper  Will continue bronchodilators   Wean oxygen as tolerated

## 2018-05-21 NOTE — CASE MANAGEMENT
Continued Stay Review    Date:      5/19/2018    Vital Signs: /83 (BP Location: Right arm)   Pulse 69   Temp 97 7 °F (36 5 °C) (Temporal)   Resp 18   Ht 5' 4" (1 626 m)   Wt 65 8 kg (145 lb 1 oz)   SpO2 99%   BMI 24 90 kg/m²     Medications:   Scheduled Meds:   Current Facility-Administered Medications:  acetaminophen 650 mg Oral Q6H PRN Roxene [place], CRNP   atorvastatin 20 mg Oral Daily With Gonzalez Enriquez, MICHAEL   docusate sodium 100 mg Oral BID Roxene [place], CRNP   escitalopram 10 mg Oral Daily Roxene [place], CRNP   levothyroxine 75 mcg Oral Early Morning J Carlos Nur MD   lidocaine 1 patch Transdermal Daily Roxene [place], CRLEIA   losartan 100 mg Oral Daily Vikram Lewis MD   metoprolol tartrate 25 mg Oral Q12H [facility] Vikram Lewis MD   pantoprazole 40 mg Oral Daily PRN Roxene [place], CRNP   pantoprazole 40 mg Intravenous Q12H [facility] Loralie [place]isbury Spatzer, CRNP   predniSONE 40 mg Oral Daily Liliana Mayes DO   QUEtiapine 25 mg Oral BID Roxene [place], CRNP   tamsulosin 0 4 mg Oral Daily With Екатерина Baires MD     Continuous Infusions:    PRN Meds:   acetaminophen    pantoprazole    Abnormal Labs/Diagnostic Results:   No labs   5/19    Age/Sex: 80 y o  male     Assessment/Plan:     Sepsis [facility])   Assessment & Plan     Patient was initially in septic shock requiring pressors now resolved  Secondary to UTI, no urine cultures available, repeat cultures no growth suggesting Rocephin effective  Completed 7/7 course of Abx - Rocephin then Ceftin last day 5/17   Monitor CBC/VS             Chronic obstructive pulmonary disease with acute exacerbation (HCC)   Assessment & Plan     And mild exacerbation suspect secondary to aspiration  Diffuse wheezing, started on IV steroid  Respiratory protocol  Supplemental oxygen  Monitor closely          Aspiration pneumonitis ([facility] [address] )   Assessment & Plan     Is evident and worsening of respiratory status, infiltrate on portable chest x-ray 5/19  Will place on respiratory protocol  Monitor clinically, hold off from antibiotics at this time as afebrile, minimal elevation in WBC     Acute hypernatremia   Assessment & Plan     Na stable at 148  Increased D5W to 100 mL/hr  Continue to monitor BMP          Macrocytic anemia   Assessment & Plan     Along with heme-positive stools, likely multifactorial anemia with workup consisted with possible MDS as well as anemia of chronic disease  Patient's H&H remains stable around 9, discussed with GI, no further workup planned         Acute cystitis with hematuria   Assessment & Plan     Urine cultures not obtained prior to antibiotics, available urine cultures reveal no growth, suggestive UTI susceptible to Rocephin which was then transitioned to Ceftin  Patient completed 7 day antibiotic course 5/17  Will monitor CBC/vital signs          Other dysphagia   Assessment & Plan     Speech therapy evaluated and diet adjusted to pureed/nectar thickened liquids  Patient's respiratory status worsened today, evidence of right-sided infiltrate on chest x-ray suspicious for aspiration pneumonitis  Continue aspiration precautions, modified diet, monitor closely          Hypokalemia   Assessment & Plan     Resolved with replacement  Monitor BMP               Mild protein-calorie malnutrition ([address] )   Assessment & Plan     Malnutrition Findings:   Malnutrition type: Chronic illness  Degree of Malnutrition: Malnutrition of mild degree     BMI Findings: Body mass index is 26 11 kg/m²     Nutrition consult  Encourage nutritional supplements and free water intake          Essential hypertension   Assessment & Plan     Continue losartan to 100 mg daily and metoprolol to 25 mg BID  Monitor VS        Coronary artery disease involving native coronary artery of native heart   Assessment & Plan     Patient on metoprolol and atorvastatin  Plavix discontinued due to anemia          Dementia with behavioral disturbance   Assessment & Plan     Plan to discharge to [address] [place] [address] once medically appropriate  Unfortunately, initial discharge plan for today was delayed due to development of worsening dyspnea and wheezing   Ongoing update of contact           MARQUISE (acute kidney injury) [facility])   Assessment & Plan     Resolved with Cr at baseline  Monitor daily BMP   The patient will continue to require additional inpatient hospital stay due to need for IV steroids, close              Discharge Plan:   SNF

## 2018-05-21 NOTE — ASSESSMENT & PLAN NOTE
Likely secondary to [address]  Urology input appreciated  To f/u with Urology outpatient for voiding trial

## 2018-05-21 NOTE — ASSESSMENT & PLAN NOTE
Malnutrition Findings:   Malnutrition type: Chronic illness  Degree of Malnutrition: Malnutrition of mild degree    BMI Findings: Body mass index is 24 9 kg/m²     Nutrition consult  Encourage nutritional supplements and free water intake

## 2018-05-21 NOTE — WOUND OSTOMY CARE
Progress Note - Wound   Chi Johnson 80 y o  male MRN: [id number]  Unit/Bed#: E5 -01 Encounter: [id number]      Assessment:   Patient seen for skin assessment at request of nursing team   Patient is incontinent of stool and has cantrell catheter in place  Fair po intake--nutrition following, supplements ordered  Requires max assist x 2 to turn for assessment  Skin folds and heels WNL  Bilateral buttocks are erythematous and blanchable  MASD to midline sacrum with partial thickness linear open area--wound edges are macerated  Patient incontinent of frequent pasty stools  Plan:   1  Turn and reposition patient every 2 hours  2   Apply accumax alternating pump to bed mattress  3   Moisturize skin daily with nourishing lotion  4   Sofcare cushion when OOB to chair  5   Apply Hydraguard lotion to b/l heels BID & PRN  6   Cleanse b/l buttocks and sacrum, pat dry  Apply calazime paste to OPEN area on mid sacrum and Hydraguard lotion to INTACT skin on b/l buttocks  7   Elevate heels off of bed on pillows to offload  8   Wound care team to follow  Plan of care reviewed with primary RN  Objective:    Vitals: Blood pressure 142/83, pulse 69, temperature 97 7 °F (36 5 °C), temperature source Temporal, resp  rate 18, height 5' 4" (1 626 m), weight 65 8 kg (145 lb 1 oz), SpO2 99 %  ,Body mass index is 24 9 kg/m²  Wound 05/20/18 Moisture associated skin damage Sacrum Mid Partial thickness linear opening (Active)   Wound Description Clean;Pink 5/21/2018  3:00 PM   Britt-wound Assessment Erythema;Fragile; Maceration 5/21/2018  3:00 PM   Shape Linear slit 5/21/2018  3:00 PM   Wound Length (cm) 0 6 cm 5/21/2018  3:00 PM   Wound Width (cm) 0 3 cm 5/21/2018  3:00 PM   Wound Depth (cm) 0 1 5/21/2018  3:00 PM   Calculated Wound Volume (cm^3) 0 02 cm^3 5/21/2018  3:00 PM   Drainage Amount None 5/21/2018  3:00 PM   Non-staged Wound Description Partial thickness 5/21/2018  3:00 PM   Treatments Cleansed;Site care 5/21/2018  3:00 PM   Dressing Protective barrier 5/21/2018  3:00 PM   Patient Tolerance Tolerated well 5/21/2018  3:00 PM   Dressing Status Open to air 5/21/2018  3:00 PM     Alejandra MONTEMAYORN, RN, Brenda Carvajal ()

## 2018-05-21 NOTE — ASSESSMENT & PLAN NOTE
Patient was initially in septic shock requiring pressors now resolved  Sepsis was secondary to UTI   Completed a full course of antibiotics

## 2018-05-21 NOTE — PROCEDURES
Video Swallow Study      Patient Name: Alix Knight  DOVHT'W Date: 5/21/2018        Past Medical History  Past Medical History:   Diagnosis Date    Cardiac disease     COPD (chronic obstructive pulmonary disease) ([address] )     Coronary artery disease     Hyperlipidemia     Hypertension           Summary:  Mild/mod oral dysphagia, mod pharyngeal dysphagia  Prolonged mastication, mildly weak transfer, mild posterior lingual residue  Delayed transfer of solid food posteriorly  Moderate pharyngeal stage  Reduced laryngeal excursion and pharyngeal constriction, Pharyngeal retention (valleculae >pyriform), mild ppm coating, aspiration w/ nectar by cup sip (inconsistent  Coughed only on larger aspiration episode  Recommendations:  Diet: puree (?may be able to advance to dysphagia 2 mechanical vs not)  Liquids: honey thick  Meds: crush  Strategies:swallow x 2, cue to cough periodically, avoid neck extension  Upright position  F/u ST tx: yes  Therapy Prognosis: fair/favorable  Prognosis considerations: overall much improved since admit  Full Supervision  Aspiration Precautions  Reflux Precautions  Results reviewed with: pt, nursing, physician  Aspiration precautions posted  Goals:  Pt will tolerate least restrictive diet w/out s/s aspiration or oral/pharyngeal difficulties  Pt will swallow x 2, 80%x w/ cues  Pt will produce a volitional cough (ability has improved since last week) at least x 3 during meal      Pt is an 83yom adm 5/11 w/ sepsis  See initial eval done by another slp for admit info  Also refer to progress notes  Previous VBS:  None knonw  Current Diet:  Dysphagia 1 puree w/ nectar  Premorbid diet:  Regular at above and beyond  O2 requirement:  2L nc  Vocal Quality/Speech:  WNL  Cognitive status:  Periods of confusion along w/ being agreeable  Refused a few times but when asked to do something he then states "ok"      Consistencies administered: Barium laden applesauce, cheerios/nectar, nutrigrain bar, bite of ham sandwich, honey thick, nectar thick, thin liquids  Liquids were administered by cup, straw, tsp     Pt was seated laterally at 90 degrees  Oral stage:  Mild/mod  Lip closure:wnl  Mastication:prolonged  Bolus formation:fair/adequate  Bolus control:soft solid remained at the posterior tongue until delayed transfer  [place] spilled to pharynx  Transfer: mild delay, ? w/ soft solid  Residue:mild posterior    Pharyngeal stage:  MOD  Swallow promptness: mild delay, mild/mod delay  Spill to valleculae: most trials  Spill to pyriforms: thin, nectar thick, sandwich  Epiglottic inversion:complete for most swallows  Laryngeal rise: variable   Art least mildly reduced  Pharyngeal constriction:mildly reduced  Vallecular retention: all consistencies  Pyriform retention:trace  PPW coating:mild/trace  Transient penetration: mixed, thin, nectar  Penetration: inconsistent w/ thin and nectar  Aspiration: nectar, > by cup sip (?trace w/ thin)  Response to aspiration: delayed cough w/ larger amount    Screening of Esophageal stage:  Clear at end of study (area of stasis at beginning of study)

## 2018-05-21 NOTE — PROGRESS NOTES
Progress Note - Ana Marrero 1934, 80 y o  male MRN: [id number]    Unit/Bed#: E5 -01 Encounter: [id number]    Primary Care Provider: Irena Ariza MD   Date and time admitted to hospital: 5/11/2018  9:36 AM        Chronic obstructive pulmonary disease with acute exacerbation ([facility] )   Assessment & Plan    Im mild exacerbation suspect secondary to aspiration  Resolving  Will change solumedrol to prednisone to continue taper  Will continue bronchodilators  Wean oxygen as tolerated        Aspiration pneumonitis (HCC)   Assessment & Plan    Is evident and worsening of respiratory status, infiltrate on portable chest x-ray 5/19  Will place on respiratory protocol  Hold on further antibiotics  Pt will need repeat cxr in 3 to 4 weeks  Wean oxygen as tolerated          Macrocytic anemia   Assessment & Plan    Along with heme-positive stools, likely multifactorial anemia with workup consisted with possible MDS as well as anemia of chronic disease  Patient's H&H remains stable around 9, discussed with GI, no further workup planned        Other dysphagia   Assessment & Plan    Appreciate speech therapy recommendations  Pt for vbs tomorrow        Mild protein-calorie malnutrition ([facility] )   Assessment & Plan    Malnutrition Findings:   Malnutrition type: Chronic illness  Degree of Malnutrition: Malnutrition of mild degree    BMI Findings: Body mass index is 24 9 kg/m²  Nutrition consult  Encourage nutritional supplements and free water intake        Urinary retention   Assessment & Plan    Likely secondary to [address] [facility]  Urology input appreciated  To f/u with Urology outpatient for voiding trial        Essential hypertension   Assessment & Plan    Stable   Continue losartan to 100 mg daily and metoprolol to 25 mg BID          Coronary artery disease involving native coronary artery of native heart   Assessment & Plan    Patient on metoprolol and atorvastatin  Plavix discontinued due to anemia        MARQUISE (acute kidney injury) [facility])   Assessment & Plan    Resolved with Cr at baseline          * Sepsis [facility])   Assessment & Plan    Patient was initially in septic shock requiring pressors now resolved  Sepsis was secondary to UTI  Completed a full course of antibiotics            VTE Pharmacologic Prophylaxis:   Pharmacologic: none due to heme positive stools, anemia  Mechanical VTE Prophylaxis in Place: Yes      Time Spent for Care: 20 minutes  More than 50% of total time spent on counseling and coordination of care as described above  Current Length of Stay: 10 day(s)    Current Patient Status: Inpatient       Discharge Plan: awaiting vbs    Code Status: Level 3 - DNAR and DNI      Subjective:   Pt seen and examined  Discussed with speech and nursing  No problems overnight  Pt denies shortness of breath  No f/c no cp no n/v/d no abd pain    Objective:     Vitals:   Temp (24hrs), Av 9 °F (36 6 °C), Min:97 7 °F (36 5 °C), Max:98 1 °F (36 7 °C)    HR:  [74-87] 81  Resp:  [18-20] 19  BP: (111-123)/(58-88) 123/88  SpO2:  [97 %] 97 %  Body mass index is 24 9 kg/m²  Input and Output Summary (last 24 hours): Intake/Output Summary (Last 24 hours) at 18 1214  Last data filed at 18 0700   Gross per 24 hour   Intake              120 ml   Output              400 ml   Net             -280 ml       Physical Exam:     Physical Exam   Constitutional: No distress  HENT:   Head: Normocephalic and atraumatic  Eyes: EOM are normal  Pupils are equal, round, and reactive to light  Neck: Normal range of motion  Neck supple  Cardiovascular: Normal rate, regular rhythm and normal heart sounds  Exam reveals no gallop and no friction rub  No murmur heard  Pulmonary/Chest: Effort normal    Rhonchi in right lower lobe, minimal in left lower lobe  Very minimal wheezing   Abdominal: Soft  Bowel sounds are normal  He exhibits no distension  There is no tenderness  There is no rebound     Musculoskeletal: Normal range of motion  Neurological: He is alert  Oriented x3 with prompting   Skin: He is not diaphoretic  Additional Data:     Labs:      Results from last 7 days  Lab Units 05/21/18  1148 05/20/18  0542   WBC Thousand/uL 15 12* 10 66*   HEMOGLOBIN g/dL 9 5* 9 7*   HEMATOCRIT % 30 1* 30 5*   PLATELETS Thousands/uL 211 191   NEUTROS PCT %  --  95*   LYMPHS PCT %  --  4*   MONOS PCT %  --  1*   EOS PCT %  --  0       Results from last 7 days  Lab Units 05/19/18  1207  05/15/18  0514   SODIUM mmol/L 136  < > 148*   POTASSIUM mmol/L 4 1  < > 3 8   CHLORIDE mmol/L 103  < > 111*   CO2 mmol/L 27  < > 24   BUN mg/dL 23  < > 32*   CREATININE mg/dL 0 99  < > 1 24   CALCIUM mg/dL 7 5*  < > 8 7   TOTAL PROTEIN g/dL  --   --  7 6   BILIRUBIN TOTAL mg/dL  --   --  0 98   ALK PHOS U/L  --   --  70   ALT U/L  --   --  47   AST U/L  --   --  35   GLUCOSE RANDOM mg/dL 245*  < > 129   < > = values in this interval not displayed  * I Have Reviewed All Lab Data Listed Above        Recent Cultures (last 7 days):           Last 24 Hours Medication List:     Current Facility-Administered Medications:  acetaminophen 650 mg Oral Q6H PRN MICHAEL [place]   atorvastatin 20 mg Oral Daily With Elena Clubs, CRNP   docusate sodium 100 mg Oral BID MICHAEL [place]   escitalopram 10 mg Oral Daily MICHAEL [place]   levothyroxine 75 mcg Oral Early Morning Mikey Phillips MD   lidocaine 1 patch Transdermal Daily MICHAEL [place]   losartan 100 mg Oral Daily Priscila Rivera MD   methylPREDNISolone sodium succinate 20 mg Intravenous Q12H Corrine Garcia MD   metoprolol tartrate 25 mg Oral Q12H [facility] Priscila Rivera MD   pantoprazole 40 mg Oral Daily PRN MICHAEL [place]   pantoprazole 40 mg Intravenous Q12H [facility] Dixie Hausen Spatzer, CRNP   QUEtiapine 25 mg Oral BID MICHAEL [place]   tamsulosin 0 4 mg Oral Daily With Virlinda Carlin, MD        Today, Patient Was Seen By: Tracy Suggs, DO

## 2018-05-22 PROBLEM — N17.9 AKI (ACUTE KIDNEY INJURY) (HCC): Status: RESOLVED | Noted: 2017-01-17 | Resolved: 2018-05-22

## 2018-05-22 PROCEDURE — 97535 SELF CARE MNGMENT TRAINING: CPT

## 2018-05-22 PROCEDURE — C9113 INJ PANTOPRAZOLE SODIUM, VIA: HCPCS | Performed by: NURSE PRACTITIONER

## 2018-05-22 PROCEDURE — 97530 THERAPEUTIC ACTIVITIES: CPT

## 2018-05-22 PROCEDURE — 99232 SBSQ HOSP IP/OBS MODERATE 35: CPT | Performed by: INTERNAL MEDICINE

## 2018-05-22 PROCEDURE — 92526 ORAL FUNCTION THERAPY: CPT

## 2018-05-22 RX ORDER — PREDNISONE 20 MG/1
40 TABLET ORAL DAILY
Qty: 11 TABLET | Refills: 0
Start: 2018-05-23

## 2018-05-22 RX ADMIN — METOPROLOL TARTRATE 25 MG: 25 TABLET ORAL at 21:06

## 2018-05-22 RX ADMIN — LIDOCAINE 1 PATCH: 50 PATCH CUTANEOUS at 09:02

## 2018-05-22 RX ADMIN — METOPROLOL TARTRATE 25 MG: 25 TABLET ORAL at 09:05

## 2018-05-22 RX ADMIN — QUETIAPINE FUMARATE 25 MG: 25 TABLET ORAL at 17:54

## 2018-05-22 RX ADMIN — QUETIAPINE FUMARATE 25 MG: 25 TABLET ORAL at 09:02

## 2018-05-22 RX ADMIN — TAMSULOSIN HYDROCHLORIDE 0.4 MG: 0.4 CAPSULE ORAL at 15:58

## 2018-05-22 RX ADMIN — PANTOPRAZOLE SODIUM 40 MG: 40 TABLET, DELAYED RELEASE ORAL at 21:06

## 2018-05-22 RX ADMIN — LOSARTAN POTASSIUM 100 MG: 50 TABLET, FILM COATED ORAL at 09:01

## 2018-05-22 RX ADMIN — PANTOPRAZOLE SODIUM 40 MG: 40 INJECTION, POWDER, FOR SOLUTION INTRAVENOUS at 09:02

## 2018-05-22 RX ADMIN — LEVOTHYROXINE SODIUM 75 MCG: 75 TABLET ORAL at 05:04

## 2018-05-22 RX ADMIN — ESCITALOPRAM OXALATE 10 MG: 10 TABLET ORAL at 09:01

## 2018-05-22 RX ADMIN — ATORVASTATIN CALCIUM 20 MG: 20 TABLET, FILM COATED ORAL at 15:58

## 2018-05-22 RX ADMIN — PREDNISONE 40 MG: 20 TABLET ORAL at 09:02

## 2018-05-22 NOTE — ASSESSMENT & PLAN NOTE
Malnutrition Findings:   Malnutrition type: Chronic illness  Degree of Malnutrition: Malnutrition of mild degree    BMI Findings: Body mass index is 26 27 kg/m²     Nutrition consult  Encourage nutritional supplements and free water intake

## 2018-05-22 NOTE — ASSESSMENT & PLAN NOTE
Along with heme-positive stools, likely multifactorial anemia with workup consisted with possible MDS as well as anemia of chronic disease  Patient's H&H remains stable, discussed with GI, no further workup planned

## 2018-05-22 NOTE — ASSESSMENT & PLAN NOTE
Appreciate speech therapy recommendations  s/p vbs  Continue current diet   Pt may continue to have speech therapy

## 2018-05-22 NOTE — SOCIAL WORK
Pt is cleared for discharge  Spoke with [facility] informing of discharge  Facility unable to take pt today as they have multiple other admissions for today and requested for discharge to be pushed back til tomorrow  Pt is setup with BLS transport with SLETs for tomorrow morning at 10am    Message left with pt's guardian Deanna Clarke of discharge tomorrow

## 2018-05-22 NOTE — SPEECH THERAPY NOTE
Speech Language/Pathology    Speech/Language Pathology Progress Note    Patient Name: Alix Knight  Today's Date: 5/22/2018     Problem List  Patient Active Problem List   Diagnosis    Rapid atrial fibrillation [facility] )    Mitral regurgitation    Cognitive decline    [facility]    Dementia with behavioral disturbance    PAT [facility]oxysmal [facility] tachycardia) [facility]    Coronary artery disease involving native coronary artery of native heart    Essential hypertension    Sepsis ([facility] )    Urinary retention    Mild protein-calorie malnutrition ([facility] )    Other dysphagia    Acute cystitis with hematuria    Macrocytic anemia    Sinus tachycardia    Acute hypernatremia    Aspiration pneumonitis (HCC)    Chronic obstructive pulmonary disease with acute exacerbation (HCC)        Past Medical History  Past Medical History:   Diagnosis Date    Cardiac disease     COPD [facility]chronic obstructive pulmonary disease) ([facility] )     Coronary artery disease     Hyperlipidemia     Hypertension         Past Surgical History  No past surgical history on file  Subjective:  "hi honey!"  Objective:  Seen for dysphagia tx s/p vbs  Pt was satting at 99 on 3L  Nurse titrating o2  Assessment:  Fed self and then stopped  "I want you to feed me"  Finished all of the [place] toast custard  Minimal oral manipulation  No s/s aspiration  Drank the honey thick milk by cup w/ pillow propped behind head to avoid neck extension  No overt s/s aspiration  He did not like the honey thick apple juice or the cream of wheat  Had one congestive cough that i believe was mucous he had not cleared when seated upright for breakfast  Not appropriate for upgraded trial   Plan/Recommendations:  Puree w/ honey thick liquids  Crush meds  Encourage intake

## 2018-05-22 NOTE — ASSESSMENT & PLAN NOTE
Im mild exacerbation suspect secondary to aspiration  Resolving  Continue steroid taper   Continue bronchodilators

## 2018-05-22 NOTE — PROGRESS NOTES
Progress Note - Marybel Elliott 1934, 80 y o  male MRN: [id number]    Unit/Bed#: E5 -01 Encounter: [id number]    Primary Care Provider: Rene Dupree MD   Date and time admitted to hospital: 5/11/2018  9:36 AM   Addendum: discharge held per request of facility        Chronic obstructive pulmonary disease with acute exacerbation ([facility] )   Assessment & Plan    Im mild exacerbation suspect secondary to aspiration  Resolving  Continue steroid taper  Continue bronchodilators        Aspiration pneumonitis [facility])   Assessment & Plan    Is evident and worsening of respiratory status, infiltrate on portable chest x-ray 5/19  Will place on respiratory protocol  Hold on further antibiotics  Pt will need repeat cxr in 3 to 4 weeks  Wean oxygen as tolerated          Macrocytic anemia   Assessment & Plan    Along with heme-positive stools, likely multifactorial anemia with workup consisted with possible MDS as well as anemia of chronic disease  Patient's H&H remains stable, discussed with GI, no further workup planned        Other dysphagia   Assessment & Plan    Appreciate speech therapy recommendations  s/p vbs  Continue current diet  Pt may continue to have speech therapy          Mild protein-calorie malnutrition ([facility] )   Assessment & Plan    Malnutrition Findings:   Malnutrition type: Chronic illness  Degree of Malnutrition: Malnutrition of mild degree    BMI Findings: Body mass index is 26 27 kg/m²  Nutrition consult  Encourage nutritional supplements and free water intake        Urinary retention   Assessment & Plan    Likely secondary to [address]  Urology input appreciated  To f/u with Urology outpatient for voiding trial        Essential hypertension   Assessment & Plan    Stable   Continue losartan to 100 mg daily and metoprolol to 25 mg BID          Coronary artery disease involving native coronary artery of native heart   Assessment & Plan    Patient on metoprolol and atorvastatin  Plavix discontinued due to anemia        Dementia with behavioral disturbance   Assessment & Plan    Plan to discharge to [facility] once medically appropriate  Ongoing update of contact POA        MARQUISE (acute kidney injury) [facility])   Assessment & Plan    Resolved with Cr at baseline          * Sepsis [facility])   Assessment & Plan    Patient was initially in septic shock requiring pressors now resolved  Sepsis was secondary to UTI  Completed a full course of antibiotics              Subjective:   Pt seen and examined  Pt doing well  No problems over night per pt or staff    Objective:     Vitals:   Temp (24hrs), Av 4 °F (36 3 °C), Min:96 9 °F (36 1 °C), Max:97 8 °F (36 6 °C)    HR:  [64-83] 83  Resp:  [18] 18  BP: (119-160)/(72-94) 154/94  SpO2:  [91 %-99 %] 97 %  Body mass index is 26 27 kg/m²  Input and Output Summary (last 24 hours): Intake/Output Summary (Last 24 hours) at 18 1134  Last data filed at 18 8688   Gross per 24 hour   Intake                0 ml   Output              600 ml   Net             -600 ml       Physical Exam:     Physical Exam   Constitutional: No distress  HENT:   Head: Normocephalic and atraumatic  Eyes: EOM are normal  Pupils are equal, round, and reactive to light  Neck: Normal range of motion  Neck supple  Cardiovascular: Normal rate and regular rhythm  Exam reveals no gallop and no friction rub  No murmur heard  Pulmonary/Chest: Effort normal    Minimal wheezing bilateral   Abdominal: Soft  Bowel sounds are normal  He exhibits no distension  There is no tenderness  Neurological: He is alert  Oriented x2 person, place   Skin: Skin is warm and dry  He is not diaphoretic           Additional Data:     Labs:      Results from last 7 days  Lab Units 18  1148 18  0542   WBC Thousand/uL 15 12* 10 66*   HEMOGLOBIN g/dL 9 5* 9 7*   HEMATOCRIT % 30 1* 30 5*   PLATELETS Thousands/uL 211 191   NEUTROS PCT %  --  95*   LYMPHS PCT %  --  4*   LYMPHO PCT % 2* --    MONOS PCT %  --  1*   MONO PCT MAN % 1*  --    EOS PCT %  --  0   EOSINO PCT MANUAL % 0  --        Results from last 7 days  Lab Units 05/21/18  1148   SODIUM mmol/L 139   POTASSIUM mmol/L 4 6   CHLORIDE mmol/L 106   CO2 mmol/L 27   BUN mg/dL 31*   CREATININE mg/dL 1 00   CALCIUM mg/dL 8 2*   GLUCOSE RANDOM mg/dL 157*                     * I Have Reviewed All Lab Data Listed Above      Recent Cultures (last 7 days):           Last 24 Hours Medication List:     Current Facility-Administered Medications:  acetaminophen 650 mg Oral Q6H PRN Kelby Najjar, CRNP   atorvastatin 20 mg Oral Daily With MICHAEL Martinez   docusate sodium 100 mg Oral BID Kelby Najjar, CRNP   escitalopram 10 mg Oral Daily Kelby Najjar, CRNP   levothyroxine 75 mcg Oral Early Morning Steve Cruz MD   lidocaine 1 patch Transdermal Daily Kelby Najjar, CRNP   losartan 100 mg Oral Daily Paulo Moralez MD   metoprolol tartrate 25 mg Oral Q12H [facility] Paulo Moralez MD   pantoprazole 40 mg Oral Daily PRN Kelby Najjar, CRNP   pantoprazole 40 mg Intravenous Q12H [facility] Marrion Spanish Spatzer, CRNP   predniSONE 40 mg Oral Daily Liliana Mayes DO   QUEtiapine 25 mg Oral BID Kelby Najjar, CRNP   tamsulosin 0 4 mg Oral Daily With Nitish Helton MD        Today, Patient Was Seen By: Jailene Livingston DO

## 2018-05-22 NOTE — OCCUPATIONAL THERAPY NOTE
Occupational Therapy Treatment Note:         05/22/18 1510   Restrictions/Precautions   Weight Bearing Precautions Per Order No   Other Precautions Fall Risk;Pain;Cognitive; Chair Alarm; Bed Alarm   Pain Assessment   Pain Assessment 0-10   Pain Score 3   Pain Location Back   Pain Orientation Bilateral   ADL   Where Assessed Supine, bed  (bed positioned as chair  )   Eating Assistance 4  Minimal Assistance   Eating Deficit Setup;Supervision/safety;Verbal cueing; Increased time to complete;Bringing food to mouth assist   Eating Comments cues for intake required  Grooming Assistance 4  Minimal Assistance   Grooming Deficit Setup;Steadying;Verbal cueing;Supervision/safety; Increased time to complete;Standing with assistive device   UB Bathing Assistance 4  Minimal Assistance   UB Bathing Deficit Setup   LB Dressing Assistance 2  Maximal Assistance   LB Dressing Deficit Setup;Verbal cueing   Therapeutic Exercise - ROM   UE-ROM Yes   ROM- Right Upper Extremities   R Shoulder AAROM   R Elbow AAROM   R Wrist AAROM   R Hand AAROM   R Position Seated   R Weight/Reps/Sets 3 sets of 10 reps  RUE ROM Comment cues with hand over hand A reqiured  ROM - Left Upper Extremities    L Shoulder AAROM   L Elbow AAROM   L Wrist AAROM   L Hand AAROM   L Position Seated   L Weight/Reps/Sets 3 sets of 10 reps  Cognition   Overall Cognitive Status Impaired   Arousal/Participation Responsive   Attention Difficulty attending to directions   Orientation Level Oriented to person;Oriented to place;Oriented to time   Memory Decreased short term memory;Decreased recall of recent events;Decreased recall of precautions   Following Commands Follows one step commands without difficulty   Additional Activities   Additional Activities Other (Comment)  (fine motor coordination activity  )   Additional Activities Comments Pt with improved engagement noted      Activity Tolerance   Activity Tolerance Patient limited by fatigue;Patient limited by pain Medical Staff Made Aware Reported all findings to nursing staff  Assessment   Assessment Pt was seen for skilled OT with focus on completion of light grooming activity, BUE AAROM, self feeding and review of current plan of care  Pt with improved activity tolerance noted this tx session  Pt with appropriate communication noted with functional tasks  Pt able to complete light grooming activity, washcloth folding with BUE at table top for more than 5 mins with consistent prompting required  Min A overall for self feeding  Pt with some learned dependency with intake of meals stating, "I don't want to", in regards to lifting spoon to intake  After initiation with use of RUE, Pt was able to intake applesauce for more than 3 mins before early termination of task was noted  Pt will require cues with all functional tasks due to loss of focus on learned dependency  See above levels of A required for all functional tasks  Pt will benefit from further rehab with focus on achieving optimal performance levels with all functional tasks  Plan   Treatment Interventions ADL retraining;Functional transfer training; Endurance training;Cognitive reorientation   Goal Expiration Date 05/29/18   Treatment Day 3   OT Frequency 3-5x/wk   Recommendation   OT Discharge Recommendation Short Term Rehab   Becki Arambula

## 2018-05-22 NOTE — DISCHARGE SUMMARY
Discharge- Ana Marrero 1934, 80 y o  male MRN: [id number]    Unit/Bed#: E5 -01 Encounter: [id number]    Primary Care Provider: Irena Ariza MD   Date and time admitted to hospital: 5/11/2018  9:36 AM    Discharged was held on 5/22 due to facility not have capabilities to take him on that day  He was discharged on 5/23    Chronic obstructive pulmonary disease with acute exacerbation [facility])   Assessment & Plan    Im mild exacerbation suspect secondary to aspiration  Resolving  Continue steroid taper  Continue bronchodilators        Aspiration pneumonitis (HCC)   Assessment & Plan    Pt improving  He will need repeat cxr in 3 to 4 weeks  Macrocytic anemia   Assessment & Plan    Along with heme-positive stools, likely multifactorial anemia with workup consisted with possible MDS as well as anemia of chronic disease  Patient's H&H remains stable, discussed with GI, no further workup planned        Other dysphagia   Assessment & Plan    Appreciate speech therapy recommendations  s/p vbs  Continue current diet  Pt may continue to have speech therapy          Mild protein-calorie malnutrition ([address] )   Assessment & Plan    Malnutrition Findings:   Malnutrition type: Chronic illness  Degree of Malnutrition: Malnutrition of mild degree    BMI Findings: Body mass index is 26 27 kg/m²  Nutrition consult  Encourage nutritional supplements and free water intake        Urinary retention   Assessment & Plan    Likely secondary to [address] [facility]  Urology input appreciated  To f/u with Urology outpatient for voiding trial        Essential hypertension   Assessment & Plan    Stable   Continue losartan to 100 mg daily and metoprolol to 25 mg BID          Coronary artery disease involving native coronary artery of native heart   Assessment & Plan    Patient on metoprolol and atorvastatin  Plavix discontinued due to anemia        Dementia with behavioral disturbance   Assessment & Plan    Plan to discharge to [facility] once medically appropriate          * Sepsis [facility])   Assessment & Plan    Patient was initially in septic shock requiring pressors now resolved  Sepsis was secondary to UTI  Completed a full course of antibiotics          MARQUISE (acute kidney injury) (HCC)resolved as of 5/22/2018   Assessment & Plan    Resolved with Cr at baseline              Discharging Physician / Practitioner: Karon Leon DO  PCP: Michael Sherwood MD  Admission Date:   Admission Orders     Ordered        05/11/18 1104  Inpatient Admission  Once             Discharge Date: 05/22/18      Reason for Admission: sepsis    Discharge Diagnoses:     Please see assessment and plan section above for further details regarding discharge diagnoses  Resolved Problems  Date Reviewed: 5/22/2018          Resolved    MARQUISE (acute kidney injury) ([address] ) 5/22/2018     Resolved by  Karon Leon DO    Hypokalemia 5/21/2018     Resolved by  Karon Leon DO            Consultations During Hospital Stay:  · urology    Procedures Performed:     · none    Wound Care Recommendations:  When applicable, please see wound care section of After Visit Summary  Diet Recommendations at Discharge:        Diet Orders            Start     Ordered    05/21/18 1529  Diet Dysphagia/Modified Consistency; Dysphagia 1-Pureed; Honey Thick Liquid  Diet effective now     Question Answer Comment   Diet Type Dysphagia/Modified Consistency    Dysphagia/Modified Consistency Dysphagia 1-Pureed    Liquid Modifier Honey Thick Liquid    RD to adjust diet per protocol?  No        05/21/18 1528    05/18/18 1051  Dietary nutrition supplements  Once     Question Answer Comment   Select SupplementLanell Blackbird Lunch, Dinner        05/18/18 1050    05/14/18 0826  Room Service  Once     Question:  Type of Service  Answer:  Room Service-Appropriate    05/14/18 0825          Significant Findings / Test Results:   Xr Chest Portable  Result Date: 5/19/2018  Impression: There is interval resolution of previously noted density identified in the left midlung field  There has been interval development of parenchymal airspace opacity in the right perihilar region and right lower lobe suspicious for either atelectasis or developing right lower lobe pneumonia  Continued radiographic follow-up is recommended  Xr Chest Portable  Result Date: 5/15/2018  Impression: Shallow depth of inspiration and soft tissue fold projecting over the left hemithorax, however findings are still suspicious for a left perihilar infiltrate with follow-up recommended as clinically indicated     Xr Chest Portable  Result Date: 5/11/2018  Impression: No acute cardiopulmonary disease  Incidental Findings:   · none    Test Results Pending at Discharge (will require follow up):   · none     Outpatient Tests Requested:  Repeat cxr in 3 to 4 weeks    Hospital Course:     Lucero Cabrera is a 80 y o  male patient who originally presented to the hospital on 5/11/2018 due to sepsis for UTI  He was originally admitted to ICU on pressors  He has since been stable off of these  Please see above for more details  Discharge Day Visit / Exam:     * Please refer to separate progress note for these details *      Discharge instructions/Information to patient and family:   See after visit summary section titled Discharge Instructions for information provided to patient and family  Discharge Statement:  I spent 34 minutes discharging the patient  This time was spent on the day of discharge  I had direct contact with the patient on the day of discharge  Greater than 50% of the total time was spent examining patient, answering all patient questions, arranging and discussing plan of care with patient as well as directly providing post-discharge instructions  Additional time then spent on discharge activities

## 2018-05-22 NOTE — PLAN OF CARE
Problem: DISCHARGE PLANNING - CARE MANAGEMENT  Goal: Discharge to post-acute care or home with appropriate resources  INTERVENTIONS:  - Conduct assessment to determine patient/family and health care team treatment goals, and need for post-acute services based on payer coverage, community resources, and patient preferences, and barriers to discharge  - Address psychosocial, clinical, and financial barriers to discharge as identified in assessment in conjunction with the patient/family and health care team  - Arrange appropriate level of post-acute services according to patients   needs and preference and payer coverage in collaboration with the physician and health care team  - Communicate with and update the patient/family, physician, and health care team regarding progress on the discharge plan  - Arrange appropriate transportation to post-acute venues   Outcome: Adequate for Discharge  -Pt is accepted to [address] [place] [address] for LTC placement  Transportation setup with [address] transport for 10am on 5/23   Message left for guardian informing of discharge

## 2018-05-22 NOTE — ASSESSMENT & PLAN NOTE
Likely secondary to [address]  Urology input appreciated  To f/u with Urology outpatient for voiding trial

## 2018-05-22 NOTE — ASSESSMENT & PLAN NOTE
Plan to discharge to [address] [place] [address] once medically appropriate  Ongoing update of contact POA

## 2018-05-22 NOTE — PLAN OF CARE
Problem: OCCUPATIONAL THERAPY ADULT  Goal: Performs self-care activities at highest level of function for planned discharge setting  See evaluation for individualized goals  Treatment Interventions: ADL retraining, Functional transfer training, UE strengthening/ROM, Endurance training, Cognitive reorientation, Patient/family training, Equipment evaluation/education, Compensatory technique education, Activityengagement, Energy conservation          See flowsheet documentation for full assessment, interventions and recommendations  Outcome: Progressing  Limitation: Decreased ADL status, Decreased UE strength, Decreased Safe judgement during ADL, Decreased cognition, Decreased endurance, Decreased self-care trans, Decreased high-level ADLs  Prognosis: Fair  Assessment: Pt was seen for skilled OT with focus on completion of light grooming activity, BUE AAROM, self feeding and review of current plan of care  Pt with improved activity tolerance noted this tx session  Pt with appropriate communication noted with functional tasks  Pt able to complete light grooming activity, washcloth folding with BUE at table top for more than 5 mins with consistent prompting required  Min A overall for self feeding  Pt with some learned dependency with intake of meals stating, "I don't want to", in regards to lifting spoon to intake  After initiation with use of RUE, Pt was able to intake applesauce for more than 3 mins before early termination of task was noted  Pt will require cues with all functional tasks due to loss of focus on learned dependency  See above levels of A required for all functional tasks  Pt will benefit from further rehab with focus on achieving optimal performance levels with all functional tasks        OT Discharge Recommendation: Short Term Rehab  OT - OK to Discharge:  (to rehab when medically stable)      Comments: Helio Sherwood, [address]

## 2018-05-22 NOTE — PLAN OF CARE
Problem: SLP ADULT - SWALLOWING, IMPAIRED  Goal: Advance to least restrictive diet without signs or symptoms of aspiration for planned discharge setting  See evaluation for individualized goals    Outcome: Adequate for Discharge

## 2018-05-22 NOTE — ASSESSMENT & PLAN NOTE
Likely secondary to [address] [facility]  Urology input appreciated  To f/u with Urology outpatient for voiding trial

## 2018-05-23 VITALS
TEMPERATURE: 97.6 F | RESPIRATION RATE: 18 BRPM | WEIGHT: 153.05 LBS | DIASTOLIC BLOOD PRESSURE: 92 MMHG | HEART RATE: 56 BPM | OXYGEN SATURATION: 100 % | SYSTOLIC BLOOD PRESSURE: 162 MMHG | HEIGHT: 64 IN | BODY MASS INDEX: 26.13 KG/M2

## 2018-05-23 PROCEDURE — 99232 SBSQ HOSP IP/OBS MODERATE 35: CPT | Performed by: INTERNAL MEDICINE

## 2018-05-23 RX ADMIN — LOSARTAN POTASSIUM 100 MG: 50 TABLET, FILM COATED ORAL at 08:03

## 2018-05-23 RX ADMIN — LIDOCAINE 1 PATCH: 50 PATCH CUTANEOUS at 08:03

## 2018-05-23 RX ADMIN — METOPROLOL TARTRATE 25 MG: 25 TABLET ORAL at 08:03

## 2018-05-23 RX ADMIN — ESCITALOPRAM OXALATE 10 MG: 10 TABLET ORAL at 08:02

## 2018-05-23 RX ADMIN — QUETIAPINE FUMARATE 25 MG: 25 TABLET ORAL at 08:03

## 2018-05-23 RX ADMIN — DOCUSATE SODIUM 100 MG: 100 CAPSULE, LIQUID FILLED ORAL at 08:03

## 2018-05-23 RX ADMIN — PREDNISONE 40 MG: 20 TABLET ORAL at 08:02

## 2018-05-23 RX ADMIN — LEVOTHYROXINE SODIUM 75 MCG: 75 TABLET ORAL at 05:23

## 2018-05-23 NOTE — PROGRESS NOTES
Progress Note - Fadumo Olvera 1934, 80 y o  male MRN: [id number]    Unit/Bed#: E5 -01 Encounter: [id number]    Primary Care Provider: Dante Figueroa MD   Date and time admitted to hospital: 5/11/2018  9:36 AM        Chronic obstructive pulmonary disease with acute exacerbation ([facility] )   Assessment & Plan    Im mild exacerbation suspect secondary to aspiration  Resolving  Continue steroid taper  Continue bronchodilators        Aspiration pneumonitis (HCC)   Assessment & Plan    Pt improving  He will need repeat cxr in 3 to 4 weeks  Macrocytic anemia   Assessment & Plan    Along with heme-positive stools, likely multifactorial anemia with workup consisted with possible MDS as well as anemia of chronic disease  Patient's H&H remains stable, discussed with GI, no further workup planned        Other dysphagia   Assessment & Plan    Appreciate speech therapy recommendations  s/p vbs  Continue current diet  Pt may continue to have speech therapy          Mild protein-calorie malnutrition ([facility] )   Assessment & Plan    Malnutrition Findings:   Malnutrition type: Chronic illness  Degree of Malnutrition: Malnutrition of mild degree    BMI Findings: Body mass index is 26 27 kg/m²  Nutrition consult  Encourage nutritional supplements and free water intake        Urinary retention   Assessment & Plan    Likely secondary to [address] [facility]  Urology input appreciated  To f/u with Urology outpatient for voiding trial        Essential hypertension   Assessment & Plan    Stable   Continue losartan to 100 mg daily and metoprolol to 25 mg BID          Coronary artery disease involving native coronary artery of native heart   Assessment & Plan    Patient on metoprolol and atorvastatin  Plavix discontinued due to anemia        Dementia with behavioral disturbance   Assessment & Plan    Plan to discharge to [facility] once medically appropriate          * Sepsis [facility])   Assessment & Plan    Patient was initially in septic shock requiring pressors now resolved  Sepsis was secondary to UTI  Completed a full course of antibiotics                Subjective:   Pt seen and examined  Pt doing well  No problems over night  Objective:     Vitals:   Temp (24hrs), Av 1 °F (36 2 °C), Min:96 6 °F (35 9 °C), Max:97 6 °F (36 4 °C)    HR:  [56-85] 56  Resp:  [16-18] 18  BP: (122-162)/(68-92) 162/92  SpO2:  [95 %-100 %] 100 %  Body mass index is 26 27 kg/m²  Input and Output Summary (last 24 hours): Intake/Output Summary (Last 24 hours) at 18 1008  Last data filed at 18 0300   Gross per 24 hour   Intake              220 ml   Output              400 ml   Net             -180 ml       Physical Exam:     Physical Exam   Constitutional: No distress  HENT:   Head: Normocephalic and atraumatic  Eyes: EOM are normal  Pupils are equal, round, and reactive to light  Neck: Normal range of motion  Neck supple  Cardiovascular: Normal rate, regular rhythm and normal heart sounds  Pulmonary/Chest:   Good inspiratory effort  Minimal wheezing bilateral   Abdominal: Soft  Bowel sounds are normal  He exhibits no distension  There is no tenderness  There is no rebound  Musculoskeletal: Normal range of motion  Neurological: He is alert  Oriented x2 person, place   Skin: He is not diaphoretic           Additional Data:     Labs:      Results from last 7 days  Lab Units 18  1148 18  0542   WBC Thousand/uL 15 12* 10 66*   HEMOGLOBIN g/dL 9 5* 9 7*   HEMATOCRIT % 30 1* 30 5*   PLATELETS Thousands/uL 211 191   NEUTROS PCT %  --  95*   LYMPHS PCT %  --  4*   LYMPHO PCT % 2*  --    MONOS PCT %  --  1*   MONO PCT MAN % 1*  --    EOS PCT %  --  0   EOSINO PCT MANUAL % 0  --        Results from last 7 days  Lab Units 18  1148   SODIUM mmol/L 139   POTASSIUM mmol/L 4 6   CHLORIDE mmol/L 106   CO2 mmol/L 27   BUN mg/dL 31*   CREATININE mg/dL 1 00   CALCIUM mg/dL 8 2*   GLUCOSE RANDOM mg/dL 157* * I Have Reviewed All Lab Data Listed Above    *    Recent Cultures (last 7 days):           Last 24 Hours Medication List:     Current Facility-Administered Medications:  acetaminophen 650 mg Oral Q6H PRN Patrice Kaplan, MICHAEL   atorvastatin 20 mg Oral Daily With MICHAEL Wall   docusate sodium 100 mg Oral BID Patriceestuardo Justicea, MICHAEL   escitalopram 10 mg Oral Daily Patriceestuardo Kaplan, MICHAEL   levothyroxine 75 mcg Oral Early Morning Helga Hunt MD   lidocaine 1 patch Transdermal Daily Patriceestuardo Kaplan, MICHAEL   losartan 100 mg Oral Daily Elizabeth Noriega MD   metoprolol tartrate 25 mg Oral Q12H [facility] Elizabeth Noriega MD   pantoprazole 40 mg Oral Daily PRN Patrice Kaplan, MICHAEL   pantoprazole 40 mg Intravenous Q12H [facility] Colvin Corwin Spatzer, CRNP   predniSONE 40 mg Oral Daily Liliana Mayes DO   QUEtiapine 25 mg Oral BID Patrice Kaplan, MICHAEL   tamsulosin 0 4 mg Oral Daily With Karina Valladares MD        Today, Patient Was Seen By: Ambrocio Bey DO

## 2018-05-23 NOTE — ASSESSMENT & PLAN NOTE
Likely secondary to [address] [facility]  Urology input appreciated  To f/u with Urology outpatient for voiding trial

## 2018-05-23 NOTE — PLAN OF CARE
DISCHARGE PLANNING - CARE MANAGEMENT     Discharge to post-acute care or home with appropriate resources Adequate for Discharge        GENITOURINARY - ADULT     Maintains or returns to baseline urinary function Adequate for Discharge     Absence of urinary retention Adequate for Discharge        HEMATOLOGIC - ADULT     Maintains hematologic stability Adequate for Discharge        METABOLIC, FLUID AND ELECTROLYTES - ADULT     Electrolytes maintained within normal limits Adequate for Discharge     Fluid balance maintained Adequate for Discharge        MUSCULOSKELETAL - ADULT     Maintain or return mobility to safest level of function Adequate for Discharge        Nutrition/Hydration-ADULT     Nutrient/Hydration intake appropriate for improving, restoring or maintaining nutritional needs Adequate for Discharge        Potential for Falls     Patient will remain free of falls Adequate for Discharge        Prexisting or High Potential for Compromised Skin Integrity     Skin integrity is maintained or improved Adequate for Discharge        SKIN/TISSUE INTEGRITY - ADULT     Skin integrity remains intact Adequate for Discharge     Oral mucous membranes remain intact Adequate for Discharge

## 2018-05-25 ENCOUNTER — TELEPHONE (OUTPATIENT)
Dept: INTERNAL MEDICINE CLINIC | Facility: CLINIC | Age: 83
End: 2018-05-25

## 2018-05-25 NOTE — TELEPHONE ENCOUNTER
FYI:    Pt was admitted to [address] [place] [address] on 5/11/2018 and discharged on 5/23/2018 with COPD with acute exacerbation  Pt was discharged to [facility]  No TCM needed at this time    [address] [place] [address] [address]

## 2018-06-12 ENCOUNTER — OFFICE VISIT (OUTPATIENT)
Dept: UROLOGY | Facility: MEDICAL CENTER | Age: 83
End: 2018-06-12
Payer: MEDICARE

## 2018-06-12 VITALS — DIASTOLIC BLOOD PRESSURE: 68 MMHG | SYSTOLIC BLOOD PRESSURE: 120 MMHG | BODY MASS INDEX: 24.55 KG/M2 | WEIGHT: 143 LBS

## 2018-06-12 DIAGNOSIS — R33.9 URINARY RETENTION: Primary | ICD-10-CM

## 2018-06-12 PROCEDURE — 99213 OFFICE O/P EST LOW 20 MIN: CPT | Performed by: UROLOGY

## 2018-06-12 RX ORDER — METOPROLOL SUCCINATE 50 MG/1
1 TABLET, EXTENDED RELEASE ORAL DAILY
COMMUNITY

## 2018-06-12 RX ORDER — LOSARTAN POTASSIUM 100 MG/1
1 TABLET ORAL DAILY
COMMUNITY

## 2018-06-12 NOTE — PROGRESS NOTES
[address] [facility]  [facility]  [address] [facility] Jamie  [place], [address] [facility]  [phone]  www  [facility]  org      NAME: Josse Fang  AGE: 80 y o  SEX: male  : 1934   MRN: [id number]    DATE: 2018  TIME: 10:38 AM    Assessment and Plan:  Urinary retention due to BPH and hypotonic bladder  He is also prone to urosepsis  Given his comorbidities, I do not think doing a voiding trial would be a good idea and I recommend keeping the catheter indefinitely  This should be changed every 4 weeks  We will see him here garland Mejia Chief Complaint     Chief Complaint   Patient presents with    Urinary Tract Infection     follow up       History of Present Illness   Hospitalized with 750 cc plus of urinary retention and urosepsis 2018  We are consulted to see him then  He was discharged home with Baird catheter for an outpatient voiding trial   Creatinine on admission was 2 82 and it dropped to 1 00 while hospitalized  This also happened in 2017 and he was seen by [facility]  Seen by urologist in past -not sure who that was  He was not a [facility]  patient  heis here accompanied only by transport personnel  He cannot give a coherent history  Baird is in place        The following portions of the patient's history were reviewed and updated as appropriate: allergies, current medications, past family history, past medical history, past social history, past surgical history and problem list     Review of Systems   Review of Systems    Active Problem List     Patient Active Problem List   Diagnosis    Rapid atrial fibrillation ([facility] )    Mitral regurgitation    Cognitive decline    Dementia with behavioral disturbance    PAT (paroxysmal atrial tachycardia) ([address] )    Coronary artery disease involving native coronary artery of native heart    Essential hypertension    Sepsis ([address] )    Urinary retention    Mild protein-calorie malnutrition ([address] )    Other dysphagia    Acute cystitis with hematuria    Macrocytic anemia    Sinus tachycardia    Acute hypernatremia    Aspiration pneumonitis (HCC)    Chronic obstructive pulmonary disease with acute exacerbation (HCC)       Objective   /68   Wt 64 9 kg (143 lb)   BMI 24 55 kg/m²     Physical Exam   Constitutional: He appears well-developed and well-nourished  Frail, demented, confused  HENT:   Head: Normocephalic and atraumatic  Eyes: EOM are normal    Musculoskeletal:   Rigid, weak movements, in wheelchair   Neurological:   Cannot give history  Yells out indiscriminately  Current Medications     Current Outpatient Prescriptions:     atorvastatin (LIPITOR) 20 mg tablet, Take 20 mg by mouth daily, Disp: , Rfl:     escitalopram (LEXAPRO) 10 mg tablet, Take 10 mg by mouth daily  , Disp: , Rfl:     hydrALAZINE (APRESOLINE) 25 mg tablet, Take 25 mg by mouth daily, Disp: , Rfl:     levothyroxine 75 mcg tablet, Take 1 tablet (75 mcg total) by mouth daily in the early morning, Disp: , Rfl: 0    losartan (COZAAR) 100 MG tablet, Take 1 tablet by mouth daily, Disp: , Rfl:     MELATONIN ER PO, Take 6 mg by mouth daily at bedtime, Disp: , Rfl:     metoprolol succinate (TOPROL-XL) 50 mg 24 hr tablet, Take 1 tablet by mouth daily, Disp: , Rfl:     pantoprazole (PROTONIX) 40 mg tablet, Take 1 tablet (40 mg total) by mouth daily, Disp: , Rfl: 0    tamsulosin (FLOMAX) 0 4 mg, Take 1 capsule by mouth daily with dinner for 30 days, Disp: 30 capsule, Rfl: 0    acetaminophen (TYLENOL) 325 mg tablet, Take 650 mg by mouth every 6 (six) hours as needed for mild pain, Disp: , Rfl:     docusate sodium (COLACE) 100 mg capsule, Take 1 capsule by mouth 2 (two) times a day for 30 days, Disp: 60 capsule, Rfl: 0    lidocaine (LIDODERM) 5 %, Place 1 patch on the skin daily for 30 days Remove & Discard patch within 12 hours or as directed by MD, Disp: 30 patch, Rfl: 0    nitroglycerin (NITROSTAT) 0 4 mg SL tablet, Place 0 4 mg under the tongue every 5 (five) minutes as needed for chest pain , Disp: , Rfl:     predniSONE 20 mg tablet, Take 2 tablets (40 mg total) by mouth daily Take 2 tablets daily for 1 more day, then 1 1/2 tablet daily for 3 days, then 1 tablet daily for 3 days, then 1/2 tablet daily for 3 days and stop, Disp: 11 tablet, Rfl: 0    QUEtiapine (SEROquel) 25 mg tablet, Take 1 tablet by mouth 2 (two) times a day for 30 days, Disp: 60 tablet, Rfl: 0        Viv Borjas MD

## 2018-06-12 NOTE — LETTER
2018     Anam Urias, [address] [facility]    Patient: Chico Ndiaye   YOB: 1934   Date of Visit: 2018       Dear Dr Thuy Murray: Thank you for referring Chico Ndiaye to me for evaluation  Below are my notes for this consultation  If you have questions, please do not hesitate to call me  I look forward to following your patient along with you  Sincerely,        Tim Woodall MD        CC: No Recipients  Tim Woodall MD  2018 10:51 AM  Sign at close encounter  [address] [facility]  [address]  [phone]  www  [facility]  org      NAME: Chico Ndiaye  AGE: 80 y o  SEX: male  : 1934   MRN: [id number]    DATE: 2018  TIME: 10:38 AM    Assessment and Plan:  Urinary retention due to BPH and hypotonic bladder  He is also prone to urosepsis  Given his comorbidities, I do not think doing a voiding trial would be a good idea and I recommend keeping the catheter indefinitely  This should be changed every 4 weeks  We will see him here garland Hernández Chief Complaint     Chief Complaint   Patient presents with    Urinary Tract Infection     follow up       History of Present Illness   Hospitalized with 750 cc plus of urinary retention and urosepsis 2018  We are consulted to see him then  He was discharged home with Baird catheter for an outpatient voiding trial   Creatinine on admission was 2 82 and it dropped to 1 00 while hospitalized  This also happened in 2017 and he was seen by [facility]  Seen by urologist in past -not sure who that was  He was not a [facility]  patient  heis here accompanied only by transport personnel  He cannot give a coherent history  Baird is in place        The following portions of the patient's history were reviewed and updated as appropriate: allergies, current medications, past family history, past medical history, past social history, past surgical history and problem list     Review of Systems   Review of Systems    Active Problem List     Patient Active Problem List   Diagnosis    [facility]    Mitral regurgitation    Cognitive decline    Dementia with behavioral disturbance    PAT (paroxysmal atrial tachycardia) ([facility] )    Coronary artery disease involving native coronary artery of native heart    Essential hypertension    Sepsis ([facility]    Urinary retention    Mild protein-calorie malnutrition ([facility] )    Other dysphagia    Acute cystitis with hematuria    Macrocytic anemia    Sinus tachycardia    Acute hypernatremia    Aspiration pneumonitis (HCC)    Chronic obstructive pulmonary disease with acute exacerbation (HCC)       Objective   /68   Wt 64 9 kg (143 lb)   BMI 24 55 kg/m²      Physical Exam   Constitutional: He appears well-developed and well-nourished  Frail, demented, confused  HENT:   Head: Normocephalic and atraumatic  Eyes: EOM are normal    Musculoskeletal:   Rigid, weak movements, in wheelchair   Neurological:   Cannot give history  Yells out indiscriminately  Current Medications     Current Outpatient Prescriptions:     atorvastatin (LIPITOR) 20 mg tablet, Take 20 mg by mouth daily, Disp: , Rfl:     escitalopram (LEXAPRO) 10 mg tablet, Take 10 mg by mouth daily  , Disp: , Rfl:     hydrALAZINE (APRESOLINE) 25 mg tablet, Take 25 mg by mouth daily, Disp: , Rfl:     levothyroxine 75 mcg tablet, Take 1 tablet (75 mcg total) by mouth daily in the early morning, Disp: , Rfl: 0    losartan (COZAAR) 100 MG tablet, Take 1 tablet by mouth daily, Disp: , Rfl:     MELATONIN ER PO, Take 6 mg by mouth daily at bedtime, Disp: , Rfl:     metoprolol succinate (TOPROL-XL) 50 mg 24 hr tablet, Take 1 tablet by mouth daily, Disp: , Rfl:     pantoprazole (PROTONIX) 40 mg tablet, Take 1 tablet (40 mg total) by mouth daily, Disp: , Rfl: 0    tamsulosin (FLOMAX) 0 4 mg, Take 1 capsule by mouth daily with dinner for 30 days, Disp: 30 capsule, Rfl: 0    acetaminophen (TYLENOL) 325 mg tablet, Take 650 mg by mouth every 6 (six) hours as needed for mild pain, Disp: , Rfl:     docusate sodium (COLACE) 100 mg capsule, Take 1 capsule by mouth 2 (two) times a day for 30 days, Disp: 60 capsule, Rfl: 0    lidocaine (LIDODERM) 5 %, Place 1 patch on the skin daily for 30 days Remove & Discard patch within 12 hours or as directed by MD, Disp: 30 patch, Rfl: 0    nitroglycerin (NITROSTAT) 0 4 mg SL tablet, Place 0 4 mg under the tongue every 5 (five) minutes as needed for chest pain , Disp: , Rfl:     predniSONE 20 mg tablet, Take 2 tablets (40 mg total) by mouth daily Take 2 tablets daily for 1 more day, then 1 1/2 tablet daily for 3 days, then 1 tablet daily for 3 days, then 1/2 tablet daily for 3 days and stop, Disp: 11 tablet, Rfl: 0    QUEtiapine (SEROquel) 25 mg tablet, Take 1 tablet by mouth 2 (two) times a day for 30 days, Disp: 60 tablet, Rfl: 0        Wayne Yin MD

## 2019-02-13 NOTE — ASSESSMENT & PLAN NOTE
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 Lesion to left forearm, small, scaly, present for unknown length of time - no symptoms.
